# Patient Record
Sex: MALE | Race: WHITE | NOT HISPANIC OR LATINO | Employment: FULL TIME | URBAN - NONMETROPOLITAN AREA
[De-identification: names, ages, dates, MRNs, and addresses within clinical notes are randomized per-mention and may not be internally consistent; named-entity substitution may affect disease eponyms.]

---

## 2018-06-06 ENCOUNTER — HOSPITAL ENCOUNTER (EMERGENCY)
Facility: HOSPITAL | Age: 53
Discharge: HOME | End: 2018-06-06
Attending: NURSE PRACTITIONER | Admitting: NURSE PRACTITIONER
Payer: COMMERCIAL

## 2018-06-06 VITALS
DIASTOLIC BLOOD PRESSURE: 96 MMHG | SYSTOLIC BLOOD PRESSURE: 165 MMHG | OXYGEN SATURATION: 99 % | TEMPERATURE: 97.2 F | HEART RATE: 102 BPM | RESPIRATION RATE: 20 BRPM

## 2018-06-06 DIAGNOSIS — M10.072 ACUTE IDIOPATHIC GOUT OF LEFT FOOT: ICD-10-CM

## 2018-06-06 LAB
BASOPHILS # BLD AUTO: 0.1 10E9/L (ref 0–0.2)
BASOPHILS NFR BLD AUTO: 0.6 %
CRP SERPL-MCNC: <2.9 MG/L (ref 0–8)
DIFFERENTIAL METHOD BLD: NORMAL
EOSINOPHIL # BLD AUTO: 0.2 10E9/L (ref 0–0.7)
EOSINOPHIL NFR BLD AUTO: 1.8 %
ERYTHROCYTE [DISTWIDTH] IN BLOOD BY AUTOMATED COUNT: 12.6 % (ref 10–15)
HCT VFR BLD AUTO: 41.5 % (ref 40–53)
HGB BLD-MCNC: 14.8 G/DL (ref 13.3–17.7)
IMM GRANULOCYTES # BLD: 0 10E9/L (ref 0–0.4)
IMM GRANULOCYTES NFR BLD: 0.3 %
LYMPHOCYTES # BLD AUTO: 2.1 10E9/L (ref 0.8–5.3)
LYMPHOCYTES NFR BLD AUTO: 23.6 %
MCH RBC QN AUTO: 32.3 PG (ref 26.5–33)
MCHC RBC AUTO-ENTMCNC: 35.7 G/DL (ref 31.5–36.5)
MCV RBC AUTO: 91 FL (ref 78–100)
MONOCYTES # BLD AUTO: 0.8 10E9/L (ref 0–1.3)
MONOCYTES NFR BLD AUTO: 9.1 %
NEUTROPHILS # BLD AUTO: 5.7 10E9/L (ref 1.6–8.3)
NEUTROPHILS NFR BLD AUTO: 64.6 %
NRBC # BLD AUTO: 0 10*3/UL
NRBC BLD AUTO-RTO: 0 /100
PLATELET # BLD AUTO: 234 10E9/L (ref 150–450)
RBC # BLD AUTO: 4.58 10E12/L (ref 4.4–5.9)
URATE SERPL-MCNC: 7.6 MG/DL (ref 3.5–7.2)
WBC # BLD AUTO: 8.8 10E9/L (ref 4–11)

## 2018-06-06 PROCEDURE — 84550 ASSAY OF BLOOD/URIC ACID: CPT | Performed by: NURSE PRACTITIONER

## 2018-06-06 PROCEDURE — 86140 C-REACTIVE PROTEIN: CPT | Performed by: NURSE PRACTITIONER

## 2018-06-06 PROCEDURE — G0463 HOSPITAL OUTPT CLINIC VISIT: HCPCS

## 2018-06-06 PROCEDURE — 36415 COLL VENOUS BLD VENIPUNCTURE: CPT | Performed by: NURSE PRACTITIONER

## 2018-06-06 PROCEDURE — 25000125 ZZHC RX 250: Performed by: NURSE PRACTITIONER

## 2018-06-06 PROCEDURE — 85025 COMPLETE CBC W/AUTO DIFF WBC: CPT | Performed by: NURSE PRACTITIONER

## 2018-06-06 PROCEDURE — 99202 OFFICE O/P NEW SF 15 MIN: CPT | Performed by: NURSE PRACTITIONER

## 2018-06-06 RX ORDER — PREDNISONE 20 MG/1
40 TABLET ORAL ONCE
Status: COMPLETED | OUTPATIENT
Start: 2018-06-06 | End: 2018-06-06

## 2018-06-06 RX ORDER — PREDNISONE 20 MG/1
TABLET ORAL
Qty: 10 TABLET | Refills: 0 | Status: SHIPPED | OUTPATIENT
Start: 2018-06-06 | End: 2018-07-11

## 2018-06-06 RX ADMIN — PREDNISONE 40 MG: 20 TABLET ORAL at 21:18

## 2018-06-06 ASSESSMENT — ENCOUNTER SYMPTOMS
HEADACHES: 0
FEVER: 0
JOINT SWELLING: 1
APPETITE CHANGE: 0
WOUND: 0
COLOR CHANGE: 1

## 2018-06-06 NOTE — ED AVS SNAPSHOT
[facility]    [address] [address]    Phone:  [phone]                                       Tomasz Newman   MRN: [id number]    Department:  [facility]   Date of Visit:  6/6/2018           Patient Information     Date Of Birth          1965        Your diagnoses for this visit were:     Acute idiopathic gout of left foot        You were seen by Sona Jackson NP.      Follow-up Information     Follow up with Blanco Vazquez MD In 5 days.    Specialty:  Family Practice    Why:  if not improving    Contact information:    [facility]  [address] [facility] [address]  [phone]          Follow up with [facility].    Specialty:  EMERGENCY MEDICINE    Why:  If symptoms worsen    Contact information:    [address] [address] [phone]    Additional information:    From [facility]: Take [address]. Turn left at [address]. Turn left at the first stoplight on [address] [place] [address]. At the first stop sign, take a right onto [address]. Take a left into the parking lot and continue through until you reach the North enterance of the building.       From [place]: Take [address]. Take the [address] ramp towards [place]. Turn left onto [address]. Take a slight right onto [address] [facility]. Turn left at the first stoplight on [address]. At the first stop sign, take a right onto [address]. Take a left into the parking lot and continue through until you reach the North enterance of the building.       From [place]: Take [address]. Take a right at [address]. At the first stop sign, take a right onto [address]. Take a left into the parking lot and continue through until you reach the North enterance of the building.         Discharge Instructions         Gout    Gout is an inflammation of a joint due to a build-up of gout  crystals in the joint fluid. This occurs when there is an excess of uric acid (a normal waste product) in the body. Uric acid builds up in the body when the kidneys are unable to filter enough of it from the blood. This may occur with age. It is also associated with kidney disease. Gout occurs more often in people with obesity, diabetes, high blood pressure, or high levels of fats in the blood. It may run in families. Gout tends to come and go. A flare up of gout is called an attack. Drinking alcohol or eating certain foods (such as shellfish or foods with additives such as high-fructose corn syrup) may increase uric acid levels in the blood and cause a gout attack.  During a gout attack, the affected joint may become a hot, red, swollen and painful. If you have had one attack of gout, you are likely to have another. An attack of gout can be treated with medicine. If these attacks become frequent, a daily medicine may be prescribed to help the kidneys remove uric acid from the body.  Home care  During a gout attack:    Rest painful joints. If gout affects the joints of your foot or leg, you may want to use crutches for the first few days to keep from bearing weight on the affected joint.    When sitting or lying down, raise the painful joint to a level higher than your heart.    Apply an ice pack (ice cubes in a plastic bag wrapped in a thin towel) over the injured area for 20 minutes every 1 to 2 hours the first day for pain relief. Continue this 3 to 4 times a day for swelling and pain.    Avoid alcohol and foods listed below (see Preventing attacks) during a gout attack. Drink extra fluid to help flush the uric acid through your kidneys.    If you were prescribed a medicine to treat gout, take it as your healthcare provider has instructed. Don't skip doses.    Take anti-inflammatory medicine as directed.     If pain medicines have been prescribed, take them exactly as directed.    Preventing attacks    Minimize  or avoid alcohol use. Excess alcohol intake can cause a gout attack.    Limit these foods and beverages:  ? Organ meats, such as kidneys and liver  ? Certain seafoods (anchovies, sardines, shrimp, scallops, herring, mackerel)  ? Wild game, meat extracts and meat gravies  ? Foods and beverages sweetened with high-fructose corn syrup, such as sodas    Eat a healthy diet including low-fat and nonfat dairy, whole grains, and vegetables.    If you are overweight, talk to your healthcare provider about a weight reduction plan. Avoid fasting or extreme low calorie diets (less than 900 calories per day). This will increase uric acid levels in the body.    If you have diabetes or high blood pressure, work with your doctor to manage these conditions.    Protect the joint from injury. Trauma can trigger a gout attack.  Follow-up care  Follow up with your healthcare provider, or as advised.                 Review of your medicines      START taking        Dose / Directions Last dose taken    predniSONE 20 MG tablet   Commonly known as:  DELTASONE   Quantity:  10 tablet        Take two tablets (= 40mg) each day for 5 (five) days   Refills:  0          Our records show that you are taking the medicines listed below. If these are incorrect, please call your family doctor or clinic.        Dose / Directions Last dose taken    acetaminophen-codeine 300-30 MG per tablet   Commonly known as:  TYLENOL #3   Dose:  1 tablet        Take 1 tablet by mouth every 6 hours as needed for severe pain   Refills:  0        FLEXERIL PO   Dose:  10 mg        Take 10 mg by mouth 3 times daily as needed for muscle spasms   Refills:  0        NAPROXEN SODIUM PO   Dose:  550 mg        Take 550 mg by mouth   Refills:  0                Information about OPIOIDS     PRESCRIPTION OPIOIDS: WHAT YOU NEED TO KNOW   You have a prescription for an opioid (narcotic) pain medicine. Opioids can cause addiction. If you have a history of chemical dependency of any  type, you are at a higher risk of becoming addicted to opioids. Only take this medicine after all other options have been tried. Take it for as short a time and as few doses as possible.     Do not:    Drive. If you drive while taking these medicines, you could be arrested for driving under the influence (DUI).    Operate heavy machinery    Do any other dangerous activities while taking these medicines.     Drink any alcohol while taking these medicines.      Take with any other medicines that contain acetaminophen. Read all labels carefully. Look for the word  acetaminophen  or  Tylenol.  Ask your pharmacist if you have questions or are unsure.    Store your pills in a secure place, locked if possible. We will not replace any lost or stolen medicine. If you don t finish your medicine, please throw away (dispose) as directed by your pharmacist. The [place] [employer]ution [employer] has more information about safe disposal: https://www.pca.[facility].us/living-green/managing-unwanted-medications    All opioids tend to cause constipation. Drink plenty of water and eat foods that have a lot of fiber, such as fruits, vegetables, prune juice, apple juice and high-fiber cereal. Take a laxative (Miralax, milk of magnesia, Colace, Senna) if you don t move your bowels at least every other day.         Prescriptions were sent or printed at these locations (1 Prescription)                   [facility] [address] [place] [address] [address] [facility] [address]   [address] [facility] [address] [place] [address]    Telephone:  [phone]   Fax:  [phone]   Hours:                  E-Prescribed (1 of 1)         predniSONE (DELTASONE) 20 MG tablet                Procedures and tests performed during your visit     CBC with platelets differential    CRP inflammation    Uric acid      Orders Needing Specimen Collection     None      Pending Results     No orders found from 6/4/2018 to 6/7/2018.            Pending Culture Results     No orders found from 6/4/2018  "to 2018.            Thank you for choosing [facility]       Thank you for choosing [facility] for your care. Our goal is always to provide you with excellent care. Hearing back from our patients is one way we can continue to improve our services. Please take a few minutes to complete the written survey that you may receive in the mail after you visit with us. Thank you!        [employer] Information     [employer] lets you send messages to your doctor, view your test results, renew your prescriptions, schedule appointments and more. To sign up, go to [email or web address] . Click on \"Log in\" on the left side of the screen, which will take you to the Welcome page. Then click on \"Sign up Now\" on the right side of the page.     You will be asked to enter the access code listed below, as well as some personal information. Please follow the directions to create your username and password.     Your access code is: [email or web address]  Expires: 2018  9:13 PM     Your access code will  in 90 days. If you need help or a new code, please call your [facility] clinic or [phone].        Care EveryWhere ID     This is your Care EveryWhere ID. This could be used by other organizations to access your [facility] medical records  [id number]        Equal Access to Services     SAL VEGA : Guicho Hernandez, waaxda luqadaha, qaybta kaalmada adeerickayada, john de guzman. So [facility] [phone].    ATENCIÓN: Si habla español, tiene a tatum disposición servicios gratuitos de asistencia lingüística. Llame al [phone].    We comply with applicable federal civil rights laws and [place] laws. We do not discriminate on the basis of race, color, national origin, age, disability, sex, sexual orientation, or gender identity.            After Visit Summary       This is your record. Keep this with you and show to your community pharmacist(s) and doctor(s) at your next visit.                  "

## 2018-06-06 NOTE — ED AVS SNAPSHOT
[facility]    [address] [address]    Phone:  [phone]                                       Tomasz Newman   MRN: [id number]    Department[facility]  [facility]   Date of Visit:  6/6/2018           After Visit Summary Signature Page     I have received my discharge instructions, and my questions have been answered. I have discussed any challenges I see with this plan with the nurse or doctor.    ..........................................................................................................................................  Patient/Patient Representative Signature      ..........................................................................................................................................  Patient Representative Print Name and Relationship to Patient    ..................................................               ................................................  Date                                            Time    ..........................................................................................................................................  Reviewed by Signature/Title    ...................................................              ..............................................  Date                                                            Time

## 2018-06-06 NOTE — LETTER
June 6, 2018      To Whom It May Concern:      Tomasz Newman was Urgent Care today and was found to have gout, 06/06/18.  I expect his condition to improve over the next 1-3 days, please allow him light duty until his pain is improved. He may return to full duty without restriction when resolved.    Sincerely,      Sona Jackson, [facility]  9:13 PM  June 6, 2018

## 2018-06-07 NOTE — DISCHARGE INSTRUCTIONS
Gout    Gout is an inflammation of a joint due to a build-up of gout crystals in the joint fluid. This occurs when there is an excess of uric acid (a normal waste product) in the body. Uric acid builds up in the body when the kidneys are unable to filter enough of it from the blood. This may occur with age. It is also associated with kidney disease. Gout occurs more often in people with obesity, diabetes, high blood pressure, or high levels of fats in the blood. It may run in families. Gout tends to come and go. A flare up of gout is called an attack. Drinking alcohol or eating certain foods (such as shellfish or foods with additives such as high-fructose corn syrup) may increase uric acid levels in the blood and cause a gout attack.  During a gout attack, the affected joint may become a hot, red, swollen and painful. If you have had one attack of gout, you are likely to have another. An attack of gout can be treated with medicine. If these attacks become frequent, a daily medicine may be prescribed to help the kidneys remove uric acid from the body.  Home care  During a gout attack:    Rest painful joints. If gout affects the joints of your foot or leg, you may want to use crutches for the first few days to keep from bearing weight on the affected joint.    When sitting or lying down, raise the painful joint to a level higher than your heart.    Apply an ice pack (ice cubes in a plastic bag wrapped in a thin towel) over the injured area for 20 minutes every 1 to 2 hours the first day for pain relief. Continue this 3 to 4 times a day for swelling and pain.    Avoid alcohol and foods listed below (see Preventing attacks) during a gout attack. Drink extra fluid to help flush the uric acid through your kidneys.    If you were prescribed a medicine to treat gout, take it as your healthcare provider has instructed. Don't skip doses.    Take anti-inflammatory medicine as directed.     If pain medicines have been  prescribed, take them exactly as directed.    Preventing attacks    Minimize or avoid alcohol use. Excess alcohol intake can cause a gout attack.    Limit these foods and beverages:  ? Organ meats, such as kidneys and liver  ? Certain seafoods (anchovies, sardines, shrimp, scallops, herring, mackerel)  ? Wild game, meat extracts and meat gravies  ? Foods and beverages sweetened with high-fructose corn syrup, such as sodas    Eat a healthy diet including low-fat and nonfat dairy, whole grains, and vegetables.    If you are overweight, talk to your healthcare provider about a weight reduction plan. Avoid fasting or extreme low calorie diets (less than 900 calories per day). This will increase uric acid levels in the body.    If you have diabetes or high blood pressure, work with your doctor to manage these conditions.    Protect the joint from injury. Trauma can trigger a gout attack.  Follow-up care  Follow up with your healthcare provider, or as advised.

## 2018-06-07 NOTE — ED TRIAGE NOTES
Pt presents today with c/o right foot pain. Pain is on top of the foot. Denies any injury. Pt says the pain started yesterday and today he has noticed that it is swollen. No bruising noted. Red area on the top of the foot, and swelling noted. Area is also war to touch. Pt states the area is starting to feel numb.

## 2018-06-07 NOTE — ED PROVIDER NOTES
History     Chief Complaint   Patient presents with     Foot Pain     The history is provided by the patient. No  was used.     Tomasz Newman is a 52 year old male who presents with redness, warmth and pain to the top of his right foot since yesterday.   Has been resting it at home with worsening symptoms. He is able to walk on it but is very painful. No injury. No illness, no fever, no recent tick bites.    Problem List:    There are no active problems to display for this patient.       Past Medical History:    No past medical history on file.    Past Surgical History:    Past Surgical History:   Procedure Laterality Date     HERNIA REPAIR  2002       Family History:    Family History   Problem Relation Age of Onset     Cancer - colorectal Father      DIABETES Brother      HEART DISEASE Mother      heart disease       Social History:  Marital Status:  Single [1]  Social History   Substance Use Topics     Smoking status: Never Smoker     Smokeless tobacco: Not on file      Comment: no passive exposure     Alcohol use Yes      Comment: wine 3 glasses occasionally        Medications:      acetaminophen-codeine (TYLENOL #3) 300-30 MG per tablet   NAPROXEN SODIUM PO   predniSONE (DELTASONE) 20 MG tablet   Cyclobenzaprine HCl (FLEXERIL PO)         Review of Systems   Constitutional: Negative for appetite change and fever.   Musculoskeletal: Positive for joint swelling.   Skin: Positive for color change. Negative for wound.   Neurological: Negative for headaches.       Physical Exam   BP: 165/96  Pulse: 102  Temp: 97.2  F (36.2  C)  Resp: 20  SpO2: 99 %      Physical Exam   Constitutional: He is oriented to person, place, and time. He appears well-developed. No distress.   HENT:   Head: Normocephalic and atraumatic.   Nose: Nose normal.   Eyes: Conjunctivae are normal. No scleral icterus.   Cardiovascular: Normal rate.    Pulmonary/Chest: Effort normal.   Musculoskeletal:        Right ankle:  Normal. Achilles tendon normal.        Right foot: There is bony tenderness and swelling. There is normal capillary refill, no crepitus, no deformity and no laceration.        Feet:    Neurological: He is alert and oriented to person, place, and time.   Skin: Skin is warm, dry and intact. He is not diaphoretic.   Psychiatric: He has a normal mood and affect.   Nursing note and vitals reviewed.      ED Course     ED Course     Procedures      Results for orders placed or performed during the hospital encounter of 06/06/18 (from the past 24 hour(s))   CBC with platelets differential   Result Value Ref Range    WBC 8.8 4.0 - 11.0 10e9/L    RBC Count 4.58 4.4 - 5.9 10e12/L    Hemoglobin 14.8 13.3 - 17.7 g/dL    Hematocrit 41.5 40.0 - 53.0 %    MCV 91 78 - 100 fl    MCH 32.3 26.5 - 33.0 pg    MCHC 35.7 31.5 - 36.5 g/dL    RDW 12.6 10.0 - 15.0 %    Platelet Count 234 150 - 450 10e9/L    Diff Method Automated Method     % Neutrophils 64.6 %    % Lymphocytes 23.6 %    % Monocytes 9.1 %    % Eosinophils 1.8 %    % Basophils 0.6 %    % Immature Granulocytes 0.3 %    Nucleated RBCs 0 0 /100    Absolute Neutrophil 5.7 1.6 - 8.3 10e9/L    Absolute Lymphocytes 2.1 0.8 - 5.3 10e9/L    Absolute Monocytes 0.8 0.0 - 1.3 10e9/L    Absolute Eosinophils 0.2 0.0 - 0.7 10e9/L    Absolute Basophils 0.1 0.0 - 0.2 10e9/L    Abs Immature Granulocytes 0.0 0 - 0.4 10e9/L    Absolute Nucleated RBC 0.0    CRP inflammation   Result Value Ref Range    CRP Inflammation <2.9 0.0 - 8.0 mg/L   Uric acid   Result Value Ref Range    Uric Acid 7.6 (H) 3.5 - 7.2 mg/dL       Medications   predniSONE (DELTASONE) tablet 40 mg (40 mg Oral Given 6/6/18 2118)       Assessments & Plan (with Medical Decision Making)     I have reviewed the nursing notes.    I have reviewed the findings, diagnosis, plan and need for follow up with the patient.  Elevated uric acid, WBC & CRP are normal. Will treat with prednisone.   Given initial dose here tonight.   Given Epic  educational materials.   See PCP if not improving in 5-7 days.   Return here if sx worsen.   Given a work note for light duty until improving.     New Prescriptions    PREDNISONE (DELTASONE) 20 MG TABLET    Take two tablets (= 40mg) each day for 5 (five) days       Final diagnoses:   Acute idiopathic gout of left foot       6/6/2018   [facility]     Sona Jackson NP  06/06/18 5598

## 2018-07-11 ENCOUNTER — APPOINTMENT (OUTPATIENT)
Dept: ULTRASOUND IMAGING | Facility: HOSPITAL | Age: 53
End: 2018-07-11
Attending: PHYSICIAN ASSISTANT
Payer: COMMERCIAL

## 2018-07-11 ENCOUNTER — HOSPITAL ENCOUNTER (EMERGENCY)
Facility: HOSPITAL | Age: 53
Discharge: HOME | End: 2018-07-11
Attending: PHYSICIAN ASSISTANT | Admitting: PHYSICIAN ASSISTANT
Payer: COMMERCIAL

## 2018-07-11 VITALS
HEART RATE: 97 BPM | TEMPERATURE: 98.9 F | SYSTOLIC BLOOD PRESSURE: 146 MMHG | DIASTOLIC BLOOD PRESSURE: 98 MMHG | OXYGEN SATURATION: 100 % | RESPIRATION RATE: 18 BRPM

## 2018-07-11 DIAGNOSIS — M79.89 SWELLING OF LEFT LOWER EXTREMITY: ICD-10-CM

## 2018-07-11 DIAGNOSIS — R21 RASH: ICD-10-CM

## 2018-07-11 PROCEDURE — 93971 EXTREMITY STUDY: CPT | Mod: TC

## 2018-07-11 PROCEDURE — 99284 EMERGENCY DEPT VISIT MOD MDM: CPT | Performed by: PHYSICIAN ASSISTANT

## 2018-07-11 PROCEDURE — 99284 EMERGENCY DEPT VISIT MOD MDM: CPT | Mod: 25

## 2018-07-11 ASSESSMENT — ENCOUNTER SYMPTOMS
BRUISES/BLEEDS EASILY: 0
COLOR CHANGE: 1
CHILLS: 0
WOUND: 0
ACTIVITY CHANGE: 0
APPETITE CHANGE: 0
SHORTNESS OF BREATH: 0
ABDOMINAL PAIN: 0
NEUROLOGICAL NEGATIVE: 1
VOMITING: 0
FEVER: 0
NAUSEA: 0
FATIGUE: 0

## 2018-07-11 NOTE — ED AVS SNAPSHOT
[facility]    [address] [address]    Phone:  [phone]                                       Tomasz Newman   MRN: [id number]    Department[facility]  [facility]   Date of Visit:  7/11/2018           After Visit Summary Signature Page     I have received my discharge instructions, and my questions have been answered. I have discussed any challenges I see with this plan with the nurse or doctor.    ..........................................................................................................................................  Patient/Patient Representative Signature      ..........................................................................................................................................  Patient Representative Print Name and Relationship to Patient    ..................................................               ................................................  Date                                            Time    ..........................................................................................................................................  Reviewed by Signature/Title    ...................................................              ..............................................  Date                                                            Time

## 2018-07-11 NOTE — ED NOTES
Patient being evaluated today for left calf pain with ambulation. He reports an injury 4 weeks ago with increased pain X 1 week. Patient denies pain at rest. He is otherwise healthy. Patient denies any SOB or chest pain. He does note, however, a small area of rash that started Monday. Patient resting on ED cart. Call light in reach.

## 2018-07-11 NOTE — DISCHARGE INSTRUCTIONS
Leg Swelling in a Single Leg  Swelling of the arms, feet, ankles, and legs is called edema. It is caused by extra fluid collecting in the tissues. Because of gravity, extra fluid in the body settles to the lowest part. That is why the legs and feet are most affected. You have swelling in a single leg.  Some of the causes for swelling in only a single leg include:    Infection in the foot or leg    Long-term problem with a vein not working well (venous insufficiency)    Swollen, twisted vein in the leg (varicose veins)    Insect bite or sting on the foot or leg    Injury or recent surgery on the foot or leg    Blood clot in a deep vein of the leg (deep vein thrombosis or DVT)    Inflammation of the joints of the lower leg  Medical treatment will depend on what is causing your swelling.  Home care  Follow these guidelines when caring for yourself at home:    Don t wear tight clothing.    Keep your legs up while lying or sitting.    Take any medicines as directed.    If infection, injury, or recent surgery is the cause of your swelling, stay off your legs as much as possible until your symptoms get better.    If you have venous insufficiency or varicose veins, don t sit or  one place for long periods of time. Take breaks and walk around every few hours. Talk with your healthcare provider about wearing support stockings to help lessen swelling during the day.    Wear compression stockings with your doctor's approval  Follow-up care  Follow up with your healthcare provider as advised.  Call 911  Call 911 if any of these occur:    Shortness of breath or trouble breathing    Chest pain    Coughing up blood    Fainting or loss of consciousness   When to seek medical advice  Call your healthcare provider right away if any of these occur:    Increased pain, swelling, warmth, or redness of the leg, ankle, or foot    Fever of 100.4 F (38 C) or higher, or as directed by your healthcare provider    Weakness or  dizziness    Shaking chills    Drenching sweats  Date Last Reviewed: 4/11/2016 2000-2017 [employer]. [address] [place] [address], [place] [address]. All rights reserved. This information is not intended as a substitute for professional medical care. Always follow your healthcare professional's instructions.      I'm not quite sure why your leg is swollen and painful.  But, I believe that you may have some form of autoimmune disease or the like.     Please monitor your symptoms closely and follow-up with Dr. Vazquez.    Please return here for ANY fevers, worsening symptoms, new rashes or other concerns.

## 2018-07-11 NOTE — ED PROVIDER NOTES
History     Chief Complaint   Patient presents with     Leg Pain     lt calf pain x 4 weeks with walking, notes redness and swelling     The history is provided by the patient.     Tomasz Newman is a 52 year old male who presented to the emergency department ambulatory for evaluation of left lower leg swelling and pain.  Symptoms have been present for approximately last 4 weeks.  Tells me that he had a possible injury to his right foot 6 weeks ago and recently underwent MRI due to persistent pain.  He tells me that the left lower extremity symptoms seem to have started at that time as well.  Denies any fevers or chills.  Denies any unusual rashes but states that he does have new onset redness on the medial aspect of the lower leg.  Denies any falls.  Pain is worse with ambulation.    Problem List:    There are no active problems to display for this patient.       Past Medical History:    No past medical history on file.    Past Surgical History:    Past Surgical History:   Procedure Laterality Date     HERNIA REPAIR  2002       Family History:    Family History   Problem Relation Age of Onset     Cancer - colorectal Father      Diabetes Brother      HEART DISEASE Mother      heart disease       Social History:  Marital Status:  Single [1]  Social History   Substance Use Topics     Smoking status: Never Smoker     Smokeless tobacco: Not on file      Comment: no passive exposure     Alcohol use Yes      Comment: wine 3 glasses occasionally        Medications:      acetaminophen-codeine (TYLENOL #3) 300-30 MG per tablet   Cyclobenzaprine HCl (FLEXERIL PO)   NAPROXEN SODIUM PO         Review of Systems   Constitutional: Negative for activity change, appetite change, chills, fatigue and fever.   Respiratory: Negative for shortness of breath.    Cardiovascular: Negative for chest pain.   Gastrointestinal: Negative for abdominal pain, nausea and vomiting.   Musculoskeletal:        Left lower leg swelling and pain with  new onset medial rash.  Right dorsal foot pain and swelling for approximately 6 weeks   Skin: Positive for color change and rash. Negative for wound.   Neurological: Negative.    Hematological: Does not bruise/bleed easily.       Physical Exam   BP: 162/96  Heart Rate: 91  Temp: 97.9  F (36.6  C)  Resp: 14  SpO2: 98 %      Physical Exam   Constitutional: He is oriented to person, place, and time. He appears well-developed and well-nourished. No distress.   Pleasant and talkative   Cardiovascular: Normal rate and regular rhythm.    Pulmonary/Chest: Effort normal.   Musculoskeletal:   Examination of the left lower extremity shows some mild swelling to the calf with tenderness upon palpation.  There is no appreciable or significant cellulitis.  There is a small area of mild macular erythema to the left medial aspect of the lower extremity just above the ankle.  It is blanchable.  There is no petechiae, purpura, ulcers, or vesicles.  There is no lymphangitis.  Examination of the knee is unremarkable.  Ankle has normal passive and active range of motion.   Neurological: He is alert and oriented to person, place, and time.   Skin: Skin is warm and dry.   Nursing note and vitals reviewed.      ED Course     ED Course     Procedures               Critical Care time:  none               No results found for this or any previous visit (from the past 24 hour(s)).    Medications - No data to display    Assessments & Plan (with Medical Decision Making)   Preliminary ultrasound report is negative for DVT.  I had a long detailed discussion with Tomasz regarding his current persistent symptoms.  I would have concerns over a likely underlying autoimmune disease causing the persistent and chronic multiple joint symptoms as well as recent persistent plantar foot pain and swelling requiring MRI and new onset left leg swelling with nonspecific medial macular rash.  Stressed close clinic follow-up.  Return to the emergency department  for any worsening symptoms, fevers, chills, or any other questions or concerns.  At this point I can find no reasonable or compelling medical indication for laboratory work.  His examination and workup is not consistent with cellulitis or infectious process.  He has no fever or tachycardia.  Tomasz voiced complete understanding and was happy and agreeable.      I have reviewed the nursing notes.    I have reviewed the findings, diagnosis, plan and need for follow up with the patient.       New Prescriptions    No medications on file       Final diagnoses:   Swelling of left lower extremity   Rash       7/11/2018   [facility]     Yayo Andersen PA-C  07/11/18 3801

## 2018-07-11 NOTE — ED AVS SNAPSHOT
[facility]    [address] [address]    Phone:  [phone]                                       Tomasz Newman   MRN: [id number]    Department:  [facility]   Date of Visit:  7/11/2018           Patient Information     Date Of Birth          1965        Your diagnoses for this visit were:     Swelling of left lower extremity     Rash        You were seen by Yayo Andersen PA-C.      Follow-up Information     Schedule an appointment as soon as possible for a visit with Blanco Vazquez MD.    Specialty:  Family Practice    Contact information:    [facility]  [address] [facility] [address]  [phone]          Follow up with [facility].    Specialty:  EMERGENCY MEDICINE    Why:  If symptoms worsen    Contact information:    [address] [address] [phone]    Additional information:    From [place]: Take [address]. Turn left at [address]. Turn left at the first stoplight on [address]. At the first stop sign, take a right onto [address]. Take a left into the parking lot and continue through until you reach the North enterance of the building.       From [place]: Take [address]. Take the [address] ramp towards [place]. Turn left onto [address]. Take a slight right onto [address]. Turn left at the first stoplight on [address]. At the first stop sign, take a right onto [address]. Take a left into the parking lot and continue through until you reach the North enterance of the building.       From [place]: Take [address]. Take a right at [address]. At the first stop sign, take a right onto [address]. Take a left into the parking lot and continue through until you reach the North enterance of the building.         Discharge Instructions         Leg Swelling in a Single Leg  Swelling of the arms, feet,  ankles, and legs is called edema. It is caused by extra fluid collecting in the tissues. Because of gravity, extra fluid in the body settles to the lowest part. That is why the legs and feet are most affected. You have swelling in a single leg.  Some of the causes for swelling in only a single leg include:    Infection in the foot or leg    Long-term problem with a vein not working well (venous insufficiency)    Swollen, twisted vein in the leg (varicose veins)    Insect bite or sting on the foot or leg    Injury or recent surgery on the foot or leg    Blood clot in a deep vein of the leg (deep vein thrombosis or DVT)    Inflammation of the joints of the lower leg  Medical treatment will depend on what is causing your swelling.  Home care  Follow these guidelines when caring for yourself at home:    Don t wear tight clothing.    Keep your legs up while lying or sitting.    Take any medicines as directed.    If infection, injury, or recent surgery is the cause of your swelling, stay off your legs as much as possible until your symptoms get better.    If you have venous insufficiency or varicose veins, don t sit or  one place for long periods of time. Take breaks and walk around every few hours. Talk with your healthcare provider about wearing support stockings to help lessen swelling during the day.    Wear compression stockings with your doctor's approval  Follow-up care  Follow up with your healthcare provider as advised.  Call 911  Call 911 if any of these occur:    Shortness of breath or trouble breathing    Chest pain    Coughing up blood    Fainting or loss of consciousness   When to seek medical advice  Call your healthcare provider right away if any of these occur:    Increased pain, swelling, warmth, or redness of the leg, ankle, or foot    Fever of 100.4 F (38 C) or higher, or as directed by your healthcare provider    Weakness or dizziness    Shaking chills    Drenching sweats  Date Last Reviewed:  4/11/2016 2000-2017 [employer]. [address] [place] [address], [place] [address]. All rights reserved. This information is not intended as a substitute for professional medical care. Always follow your healthcare professional's instructions.      I'm not quite sure why your leg is swollen and painful.  But, I believe that you may have some form of autoimmune disease or the like.     Please monitor your symptoms closely and follow-up with Dr. Vazquez.    Please return here for ANY fevers, worsening symptoms, new rashes or other concerns.        Review of your medicines      Our records show that you are taking the medicines listed below. If these are incorrect, please call your family doctor or clinic.        Dose / Directions Last dose taken    acetaminophen-codeine 300-30 MG per tablet   Commonly known as:  TYLENOL #3   Dose:  1 tablet        Take 1 tablet by mouth every 6 hours as needed for severe pain   Refills:  0        FLEXERIL PO   Dose:  10 mg        Take 10 mg by mouth 3 times daily as needed for muscle spasms   Refills:  0        NAPROXEN SODIUM PO   Dose:  550 mg        Take 550 mg by mouth   Refills:  0                Procedures and tests performed during your visit     US Lower Extremity Venous Duplex Left      Orders Needing Specimen Collection     None      Pending Results     Date and Time Order Name Status Description    7/11/2018 1617  Lower Extremity Venous Duplex Left In process             Pending Culture Results     No orders found from 7/9/2018 to 7/12/2018.            Thank you for choosing [facility]       Thank you for choosing [facility] for your care. Our goal is always to provide you with excellent care. Hearing back from our patients is one way we can continue to improve our services. Please take a few minutes to complete the written survey that you may receive in the mail after you visit with us. Thank you!        [facility] Information     [employer] lets you send messages to your  "doctor, view your test results, renew your prescriptions, schedule appointments and more. To sign up, go to [email or web address] . Click on \"Log in\" on the left side of the screen, which will take you to the Welcome page. Then click on \"Sign up Now\" on the right side of the page.     You will be asked to enter the access code listed below, as well as some personal information. Please follow the directions to create your username and password.     Your access code is: [email or web address]  Expires: 2018  9:13 PM     Your access code will  in 90 days. If you need help or a new code, please call your [facility] clinic or [phone].        Care EveryWhere ID     This is your Care EveryWhere ID. This could be used by other organizations to access your [facility] medical records  [id number]        Equal Access to Services     [facility]: Hadceline Hernandez, catina alejandra, lavonne george, john mcdonald . So [facility] [phone].    ATENCIÓN: Si habla español, tiene a tatum disposición servicios gratuitos de asistencia lingüística. Llame al [phone].    We comply with applicable federal civil rights laws and [place] laws. We do not discriminate on the basis of race, color, national origin, age, disability, sex, sexual orientation, or gender identity.            After Visit Summary       This is your record. Keep this with you and show to your community pharmacist(s) and doctor(s) at your next visit.                  "

## 2019-04-02 ENCOUNTER — OFFICE VISIT (OUTPATIENT)
Dept: PODIATRY | Facility: OTHER | Age: 54
End: 2019-04-02
Attending: PODIATRIST
Payer: COMMERCIAL

## 2019-04-02 ENCOUNTER — TELEPHONE (OUTPATIENT)
Dept: PODIATRY | Facility: OTHER | Age: 54
End: 2019-04-02

## 2019-04-02 VITALS
TEMPERATURE: 97.6 F | SYSTOLIC BLOOD PRESSURE: 152 MMHG | WEIGHT: 180 LBS | HEIGHT: 70 IN | HEART RATE: 101 BPM | BODY MASS INDEX: 25.77 KG/M2 | OXYGEN SATURATION: 95 % | DIASTOLIC BLOOD PRESSURE: 80 MMHG

## 2019-04-02 DIAGNOSIS — M79.671 RIGHT FOOT PAIN: ICD-10-CM

## 2019-04-02 DIAGNOSIS — M84.375A STRESS FRACTURE OF METATARSAL BONE OF LEFT FOOT, INITIAL ENCOUNTER: ICD-10-CM

## 2019-04-02 DIAGNOSIS — M79.672 LEFT FOOT PAIN: ICD-10-CM

## 2019-04-02 DIAGNOSIS — M84.374A STRESS FRACTURE OF METATARSAL BONE OF RIGHT FOOT, INITIAL ENCOUNTER: ICD-10-CM

## 2019-04-02 DIAGNOSIS — M21.6X1 ACQUIRED BILATERAL PES CAVUS: ICD-10-CM

## 2019-04-02 DIAGNOSIS — M84.374A STRESS FRACTURE OF METATARSAL BONE OF RIGHT FOOT, INITIAL ENCOUNTER: Primary | ICD-10-CM

## 2019-04-02 DIAGNOSIS — M21.6X2 ACQUIRED BILATERAL PES CAVUS: ICD-10-CM

## 2019-04-02 PROCEDURE — 99203 OFFICE O/P NEW LOW 30 MIN: CPT | Performed by: PODIATRIST

## 2019-04-02 PROCEDURE — 73630 X-RAY EXAM OF FOOT: CPT | Mod: TC

## 2019-04-02 ASSESSMENT — MIFFLIN-ST. JEOR: SCORE: 1677.72

## 2019-04-02 ASSESSMENT — PAIN SCALES - GENERAL: PAINLEVEL: EXTREME PAIN (8)

## 2019-04-02 NOTE — NURSING NOTE
"Chief Complaint   Patient presents with     Musculoskeletal Problem     right foot pain       Initial /80 (BP Location: Left arm, Patient Position: Sitting, Cuff Size: Adult Regular)   Pulse 101   Temp 97.6  F (36.4  C) (Tympanic)   Ht 1.778 m (5' 10\")   Wt 81.6 kg (180 lb)   SpO2 95%   BMI 25.83 kg/m   Estimated body mass index is 25.83 kg/m  as calculated from the following:    Height as of this encounter: 1.778 m (5' 10\").    Weight as of this encounter: 81.6 kg (180 lb).  Medication Reconciliation: complete    Helena Coffman LPN  "

## 2019-04-02 NOTE — TELEPHONE ENCOUNTER
"Called and left vm to return call  ----- Message from Jyoti Steinberg DPM sent at 4/2/2019 11:38 AM CDT -----  Please call patient with his x-ray results.    His RIGHT foot showed a healed or a healing stress fracture of the 2nd metatarsal. There was a \"bone callus\" which means the stress fracture is old, but with his current pain increasing, this could mean his old stress fracture is re-occurring.    The LEFT foot did not currently show a stress fracture, but with the location of his pain in clinic, he may have an early stress fracture that isn't yet showing on the x-ray.    Patient's best plan to decrease his pain is to wear the CAM boots on both legs consistently fr six weeks then slowly progress into a custom insert.      Patient returned call and results were given with patient understanding.  "

## 2019-04-02 NOTE — PROGRESS NOTES
"Chief complaint: Patient presents with:  Musculoskeletal Problem: right foot pain      History of Present Illness: This 53 year old male is seenfor evaluation and suggestions of management of RIGHT foot pain. This is a dull, aching pain on both feet (R>L) last June, 2018. He does not recall any trauma to the foot.     Patient says he had a fracture in his RIGHT 2nd metatarsal that developed last June, 2018. He works for [employer] and he walks a lot on his feet. He was walking at work and the pain in in his foot started one day and got worse throughout the day. He went to Urgent Care on 06/06/2018 and he says an x-ray showed no fracture, so they diagnosed him with gout. He went to an orthopedic at [facility] where he had an MRI that confirmed a fracture of the 2nd metatarsal. They placed him in a CAM boot, but he couldn't tolerate it because he felt off balance. The post-op shoe was open toed so he was not allowed to wear it at work. He couldn't use crutches at work so he didn't offload it at all. The pain continued and was steady until the past month or two when the pan has recently increased.     He also complains of a new dorsal foot pain of his LEFT lateral foot. It is not as painful as his RIGHT foot, but the pain feels similar to how the RIGHT foot foot pain felt when the pain first developed. No further pedal complaints today.     /80 (BP Location: Left arm, Patient Position: Sitting, Cuff Size: Adult Regular)   Pulse 101   Temp 97.6  F (36.4  C) (Tympanic)   Ht 1.778 m (5' 10\")   Wt 81.6 kg (180 lb)   SpO2 95%   BMI 25.83 kg/m      There is no problem list on file for this patient.      Past Surgical History:   Procedure Laterality Date     HERNIA REPAIR  2002       Current Outpatient Medications   Medication     acetaminophen-codeine (TYLENOL #3) 300-30 MG per tablet     Cyclobenzaprine HCl (FLEXERIL PO)     NAPROXEN SODIUM PO     No current facility-administered medications for this visit.  "          Allergies   Allergen Reactions     Percocet [Oxycodone-Acetaminophen]      Facial Swelling       Family History   Problem Relation Age of Onset     Cancer - colorectal Father      Diabetes Brother      Heart Disease Mother         heart disease       Social History     Socioeconomic History     Marital status: Single     Spouse name: None     Number of children: None     Years of education: None     Highest education level: None   Occupational History     None   Social Needs     Financial resource strain: None     Food insecurity:     Worry: None     Inability: None     Transportation needs:     Medical: None     Non-medical: None   Tobacco Use     Smoking status: Never Smoker     Smokeless tobacco: Never Used     Tobacco comment: no passive exposure   Substance and Sexual Activity     Alcohol use: Yes     Comment: wine 3 glasses occasionally     Drug use: None     Sexual activity: None   Lifestyle     Physical activity:     Days per week: None     Minutes per session: None     Stress: None   Relationships     Social connections:     Talks on phone: None     Gets together: None     Attends Mandaen service: None     Active member of club or organization: None     Attends meetings of clubs or organizations: None     Relationship status: None     Intimate partner violence:     Fear of current or ex partner: None     Emotionally abused: None     Physically abused: None     Forced sexual activity: None   Other Topics Concern      Service Not Asked     Blood Transfusions Not Asked     Caffeine Concern Yes     Comment: soda 2 cups daily     Occupational Exposure Not Asked     Hobby Hazards Not Asked     Sleep Concern Not Asked     Stress Concern Not Asked     Weight Concern Not Asked     Special Diet Not Asked     Back Care Not Asked     Exercise Not Asked     Bike Helmet Not Asked     Seat Belt Not Asked     Self-Exams Not Asked     Parent/sibling w/ CABG, MI or angioplasty before 65F 55M? Not Asked    Social History Narrative     None       ROS: 10 point ROS neg other than the symptoms noted above in the HPI.  EXAM  Constitutional: healthy, alert and no distress    Psychiatric: mentation appears normal and affect normal/bright    VASCULAR:  -Dorsalis pedis pulse +2/4 b/l  -Posterior tibial pulse +2/4 b/l  -Capillary refill time < 3 seconds to b/l hallux  -Hair growth Present to b/l anterior legs and ankles  NEURO:  -Light touch sensation intact to b/l plantar forefoot  DERM:  -Skin temperature, texture and turgor WNL b/l  MSK:  -Moderate [ain on palpation to mid shaft of RIGHT 2nd and LEFT 4th metatarsal  ---Mild-to-moderate pain on palpation to RIGHT 3rd metatarsal mid shaft  -High arches bilaterally while patient is NWB  -Muscle strength of ankles +5/5 for dorsiflexion, plantarflexion, ABDUction and ADDuction b/l    RIGHT FOOT RADIOGRAPH 04/02/2019  IMPRESSION: Healing/healed fracture of the distal shaft of the second metatarsal. No acute abnormality.  DION VEGA MD  LEFT FOOT RADIOGRAPH 04/02/2019  FINDINGS: There is lucency through the medial sesamoid, probably a normal variant. No definite fracture is seen and there is no dislocation. In particular, no fracture is seen in the fourth metatarsal.  There is mild degenerative change in the first metatarsal phalangeal joint and there is mild vascular calcification.                                                                 IMPRESSION: No acute fracture.  DION VEGA MD  ============================================================    ASSESSMENT:  (M84.374A) Stress fracture of metatarsal bone of right foot, initial encounter  (primary encounter diagnosis)    (M84.375A) Stress fracture of metatarsal bone of left foot, initial encounter    (M79.671) Right foot pain    (M79.672) Left foot pain    (M21.6X1,  M21.6X2) Acquired bilateral pes cavus      PLAN:  -Patient evaluated and examined. Treatment options discussed with no educational barriers  noted.  -Patient's fracture from last June was likely a stress fracture. He has not offloaded or treated the foot which means he likely still has the stress fracture and it may have progressed. Stressed to the patient how leaving a stress fracture untreated can lead to a larger and more traumatic fracture. If the fracture displaces, it couple potentially require surgical correction.  -Stressed the importance of offloaded a stress fracture in the foot. Patient says he understands and he is in agreement with this plan.  -Patient's clinical exam is positive for a stress fracture likely in both feet (RIGHT 2nd and LEFT 4th metatarsals). The RIGHT x-ray showed evidence of a healed or healing stress fracture. The LEFT foot x-ray did not currently show an x-ray, but these findings can lag on the radiographs.   ---It is recommended the patient be fit for inserts to decrease the pressure and re-occurence of these stress fractures. Do to his history of foot pain from a stress fracture ad his current pain, it is recommended he offload the pressure on both feet. He is in agreement with this plan.  ---Discussed importance of offloading and what will happen if he doesn't offload stress fractures  -Bilateral WB radiographs ordered  ---Called patient with results  -DME bilateral CAM walker boots and walker or other assistive device  -Orthotic referral for CMO with metatarsal pad, rigid surface and carbon fiber foot plate to prevent recurrence of stress fractures when patient transitions back to a regular shoe. The patient may call the orthotist to make adjustments if the inserts are not comfortable after consistently wearing them for 4-6 weeks.   -Dispensed work note stating patient needs to wear bilateral CAM boots  -Patient in agreement with the above treatment plan and all of patient's questions were answered.      RTC three weeks to evaluate progress        Jyoti Steinberg DPM

## 2019-04-02 NOTE — LETTER
April 2, 2019      Tomasz Newman  [address] [address]        To Whom It May Concern:    Tomasz Newman  was seen on by our clinic today. He is instructed to wear a CAM walker boot on BOTH feet for the next six weeks. He is not to walk without these boots. He may work if he is able to sit for his job with minimal walking. He may need to use an assistive device such as a walker for stability with the CAM boot applied to both legs. He will be evaluated again in three weeks to monitor his progress.        Sincerely,        Jyoti Steinberg DPM

## 2019-04-24 ENCOUNTER — OFFICE VISIT (OUTPATIENT)
Dept: PODIATRY | Facility: OTHER | Age: 54
End: 2019-04-24
Attending: PODIATRIST
Payer: COMMERCIAL

## 2019-04-24 VITALS
DIASTOLIC BLOOD PRESSURE: 92 MMHG | OXYGEN SATURATION: 98 % | SYSTOLIC BLOOD PRESSURE: 154 MMHG | HEART RATE: 104 BPM | TEMPERATURE: 98.8 F

## 2019-04-24 DIAGNOSIS — M84.375A STRESS FRACTURE OF METATARSAL BONE OF LEFT FOOT, INITIAL ENCOUNTER: ICD-10-CM

## 2019-04-24 DIAGNOSIS — M79.672 LEFT FOOT PAIN: ICD-10-CM

## 2019-04-24 DIAGNOSIS — M79.671 RIGHT FOOT PAIN: ICD-10-CM

## 2019-04-24 DIAGNOSIS — M21.6X2 ACQUIRED BILATERAL PES CAVUS: ICD-10-CM

## 2019-04-24 DIAGNOSIS — M84.374A STRESS FRACTURE OF METATARSAL BONE OF RIGHT FOOT, INITIAL ENCOUNTER: Primary | ICD-10-CM

## 2019-04-24 DIAGNOSIS — M21.6X1 ACQUIRED BILATERAL PES CAVUS: ICD-10-CM

## 2019-04-24 PROCEDURE — 99213 OFFICE O/P EST LOW 20 MIN: CPT | Performed by: PODIATRIST

## 2019-04-24 RX ORDER — ASPIRIN 81 MG/1
81 TABLET ORAL DAILY
COMMUNITY

## 2019-04-24 RX ORDER — IBUPROFEN 800 MG/1
800 TABLET, FILM COATED ORAL EVERY 8 HOURS PRN
Qty: 20 TABLET | Refills: 0 | Status: ON HOLD | OUTPATIENT
Start: 2019-04-24 | End: 2021-12-15

## 2019-04-24 ASSESSMENT — PAIN SCALES - GENERAL: PAINLEVEL: EXTREME PAIN (8)

## 2019-04-24 NOTE — LETTER
April 24, 2019      Tomasz Newman  [address]  [facility] [address]        To Whom It May Concern:    Tomasz Newman was seen in our clinic on 04/24/2019. He may return to work with the following: he has to wear a CAM walker boot on both feet at all times. He can do minimal walking with the boots. He may only work if he can do a sitting job with minimal walking while using crutches to take additional pressure off the RIGHT foot. He will be seen in four weeks on 05/22/2019 to determine how much longer (if needed) he will be off his feet.       Sincerely,        Jyoti Steinberg DPM

## 2019-04-24 NOTE — PATIENT INSTRUCTIONS
Dr. Steinberg 5/22 @1 pm- Please get Xrays done at [facility] before appt  Kathy Bang 5/14 @10 AM- Same department as podiatry    No weight bearing to either legs with use of crutch on right. Wear Cam boots at all times except while driving.

## 2019-04-24 NOTE — PROGRESS NOTES
Chief complaint: Patient presents with:  Foot Pain: bilateral      History of Present Illness: This 53 year old male is seen for evaluation and suggestions of management of RIGHT foot pain. This is a dull, aching pain on both feet (R>L) last June, 2018. He has been wearing bilateral CAM walker boots sine 04/02/2019. He was not called by the orthotist to get inserts. He is wearing the CAM boots whenever he is at home (about 6-8 hours a day). He is not working and he only goes without the boots to get the mail or to drive somewhere. He presents in regular tennis shoes since he had to drive here. He is not noticing much pain relief with the the CAM boots. Both feet are very painful but the R>L. He is afraid that once the pain improves, going back to work will cause this pain to return again. He is also looking for something to help with his pain at night. No further pedal complaints today.     History of foot pain:   Patient says he had a fracture in his RIGHT 2nd metatarsal that developed last June, 2018. He works for [employer] and he walks a lot on his feet. He was walking at work and the pain in in his foot started one day and got worse throughout the day. He went to Urgent Care on 06/06/2018 and an x-ray showed no fracture, so they diagnosed him with gout. He went to an orthopedic at [facility] where he had an MRI that confirmed a fracture of the 2nd metatarsal. They placed him in a CAM boot, but he couldn't tolerate it because he felt off balance. The post-op shoe was open toed so he was not allowed to wear it at work. He couldn't use crutches at work so he didn't offload it at all. The pain continued and was steady until February/March of 2019 when the pain increased.  He did not recall any trauma to the foot.     He also complains of a new dorsal foot pain of his LEFT lateral foot. It is not as painful as his RIGHT foot, but the pain feels similar to how the RIGHT foot foot pain felt when the pain first  developed. No further pedal complaints today.       BP (!) 154/92   Pulse 104   Temp 98.8  F (37.1  C)   SpO2 98%     There is no problem list on file for this patient.      Past Surgical History:   Procedure Laterality Date     HERNIA REPAIR  2002       Current Outpatient Medications   Medication     aspirin 81 MG EC tablet     ibuprofen (ADVIL/MOTRIN) 800 MG tablet     order for DME     acetaminophen-codeine (TYLENOL #3) 300-30 MG per tablet     Cyclobenzaprine HCl (FLEXERIL PO)     NAPROXEN SODIUM PO     No current facility-administered medications for this visit.           Allergies   Allergen Reactions     Percocet [Oxycodone-Acetaminophen]      Facial Swelling       Family History   Problem Relation Age of Onset     Cancer - colorectal Father      Diabetes Brother      Heart Disease Mother         heart disease       Social History     Socioeconomic History     Marital status: Single     Spouse name: Not on file     Number of children: Not on file     Years of education: Not on file     Highest education level: Not on file   Occupational History     Not on file   Social Needs     Financial resource strain: Not on file     Food insecurity:     Worry: Not on file     Inability: Not on file     Transportation needs:     Medical: Not on file     Non-medical: Not on file   Tobacco Use     Smoking status: Never Smoker     Smokeless tobacco: Never Used     Tobacco comment: no passive exposure   Substance and Sexual Activity     Alcohol use: Yes     Comment: wine 3 glasses occasionally     Drug use: Not on file     Sexual activity: Not on file   Lifestyle     Physical activity:     Days per week: Not on file     Minutes per session: Not on file     Stress: Not on file   Relationships     Social connections:     Talks on phone: Not on file     Gets together: Not on file     Attends Yarsani service: Not on file     Active member of club or organization: Not on file     Attends meetings of clubs or organizations:  Not on file     Relationship status: Not on file     Intimate partner violence:     Fear of current or ex partner: Not on file     Emotionally abused: Not on file     Physically abused: Not on file     Forced sexual activity: Not on file   Other Topics Concern      Service Not Asked     Blood Transfusions Not Asked     Caffeine Concern Yes     Comment: soda 2 cups daily     Occupational Exposure Not Asked     Hobby Hazards Not Asked     Sleep Concern Not Asked     Stress Concern Not Asked     Weight Concern Not Asked     Special Diet Not Asked     Back Care Not Asked     Exercise Not Asked     Bike Helmet Not Asked     Seat Belt Not Asked     Self-Exams Not Asked     Parent/sibling w/ CABG, MI or angioplasty before 65F 55M? Not Asked   Social History Narrative     Not on file       ROS: 10 point ROS neg other than the symptoms noted above in the HPI.  EXAM  Constitutional: healthy, alert and no distress     Psychiatric: mentation appears normal and affect normal/bright     VASCULAR:  -Dorsalis pedis pulse +2/4 b/l  -Posterior tibial pulse +2/4 b/l  -Capillary refill time < 3 seconds to b/l hallux  -Hair growth Present to b/l anterior legs and ankles    NEURO:  -Light touch sensation intact to b/l plantar forefoot    DERM:  -Skin temperature, texture and turgor WNL b/l    MSK:  -Moderate pain on palpation to mid shaft of RIGHT 2nd and LEFT 4th metatarsal (no improvement) from 04/02/2019  ---Mild-to-moderate pain on palpation to RIGHT 3rd metatarsal mid shaft  -High arches bilaterally while patient is NWB  -Muscle strength of ankles +5/5 for dorsiflexion, plantarflexion, ABDUction and ADDuction b/l     RIGHT FOOT RADIOGRAPH 04/02/2019  IMPRESSION: Healing/healed fracture of the distal shaft of the second metatarsal. No acute abnormality.  DION VEGA MD  LEFT FOOT RADIOGRAPH 04/02/2019  FINDINGS: There is lucency through the medial sesamoid, probably a normal variant. No definite fracture is seen and  there is no dislocation. In particular, no fracture is seen in the fourth metatarsal.  There is mild degenerative change in the first metatarsal phalangeal joint and there is mild vascular calcification.                                                                 IMPRESSION: No acute fracture.  DION VEGA MD  ============================================================     ASSESSMENT:  (M84.374A) Stress fracture of metatarsal bone of right foot, initial encounter  (primary encounter diagnosis)     (M84.375A) Stress fracture of metatarsal bone of left foot, initial encounter     (M79.671) Right foot pain     (M79.672) Left foot pain     (M21.6X1,  M21.6X2) Acquired bilateral pes cavus       PLAN:  -Patient evaluated and examined. Treatment options discussed with no educational barriers noted.  -Patient's stress fractures and resulting pain have not improved.  ---Patient needs to be fare more aggressive with offloading of his feet  ---Stressed the importance of offloading the stress fractures in the feet. Patient says he understands and he is in agreement with this plan. He is not open to a cast (for the RIGHT foot) because he lives alone and he says he needs to drive himself to multiple locations.  ---Instructed the patient to wear the long leg CAM boots all day every day whenever he is walking. He is only to remove the RIGHT CAM boot when he is driving, but he is instructed to take his CAM boot with him and apply it as soon as her reaches his destination.  ---Patient has crutches at home. He is to use these to take all weight off the RIGHT foot to improve these healing (RIGHT is more painful than LEFT).  -IBUprofen 800mg prescribed. Patient is instructed only to take this when he really needs it. Tylenol has not helped decrease his pain and IBUprofen may limit healing if he takes it frequently. He says he understands and he will only take it when his pain is uncontrolled at night before bed.    -Stressed  the importance of orthotics. Patient will be more likely to develop another stress fracture if his gait isn't corrected with proper inserts. An appointment was made with the orthotist for 05/14/2019 to be fit for CMOs.  -Orthotist appointment 05/13/2019   -Dispensed work note stating patient needs to wear bilateral CAM boots and use crutches for the RIGHT foot  -Patient in agreement with the above treatment plan and all of patient's questions were answered.        RTC four weeks to evaluate progress  Will consider MRI if patient has been compliant and still has no improvement      Jyoti Steinberg DPM

## 2019-05-22 ENCOUNTER — ANCILLARY PROCEDURE (OUTPATIENT)
Dept: GENERAL RADIOLOGY | Facility: OTHER | Age: 54
End: 2019-05-22
Attending: PODIATRIST
Payer: COMMERCIAL

## 2019-05-22 ENCOUNTER — OFFICE VISIT (OUTPATIENT)
Dept: PODIATRY | Facility: OTHER | Age: 54
End: 2019-05-22
Attending: PODIATRIST
Payer: COMMERCIAL

## 2019-05-22 VITALS
BODY MASS INDEX: 26.48 KG/M2 | WEIGHT: 185 LBS | RESPIRATION RATE: 18 BRPM | TEMPERATURE: 98 F | SYSTOLIC BLOOD PRESSURE: 138 MMHG | HEART RATE: 114 BPM | DIASTOLIC BLOOD PRESSURE: 88 MMHG | HEIGHT: 70 IN | OXYGEN SATURATION: 98 %

## 2019-05-22 DIAGNOSIS — M84.375A STRESS FRACTURE OF METATARSAL BONE OF LEFT FOOT, INITIAL ENCOUNTER: ICD-10-CM

## 2019-05-22 DIAGNOSIS — M84.374A STRESS FRACTURE OF METATARSAL BONE OF RIGHT FOOT, INITIAL ENCOUNTER: ICD-10-CM

## 2019-05-22 DIAGNOSIS — M79.672 LEFT FOOT PAIN: ICD-10-CM

## 2019-05-22 DIAGNOSIS — M79.671 RIGHT FOOT PAIN: ICD-10-CM

## 2019-05-22 DIAGNOSIS — M21.6X2 ACQUIRED BILATERAL PES CAVUS: ICD-10-CM

## 2019-05-22 DIAGNOSIS — M84.375A STRESS FRACTURE OF METATARSAL BONE OF LEFT FOOT, INITIAL ENCOUNTER: Primary | ICD-10-CM

## 2019-05-22 DIAGNOSIS — M21.6X1 ACQUIRED BILATERAL PES CAVUS: ICD-10-CM

## 2019-05-22 PROCEDURE — 73630 X-RAY EXAM OF FOOT: CPT | Mod: TC

## 2019-05-22 PROCEDURE — 99213 OFFICE O/P EST LOW 20 MIN: CPT | Performed by: PODIATRIST

## 2019-05-22 ASSESSMENT — MIFFLIN-ST. JEOR: SCORE: 1700.4

## 2019-05-22 ASSESSMENT — PAIN SCALES - GENERAL: PAINLEVEL: MODERATE PAIN (5)

## 2019-05-22 NOTE — LETTER
May 22, 2019      Tomasz Newman  [address]  [facility] [id number]        To Whom It May Concern:    Tomasz Newman  was seen on 05/22/2019.  Please excuse him  until 06/10/2019 due to bilateral foot injuries. He will be seen again prior to 06/10/2019 to evaluate if he may return sooner or if his return date will be delayed. He can only perform sitting duties at work or standing duties that allow him to wear CAM walker boot on both feet and not standing for longer than 20 minutes at a time with a 30 minute rest between standing.        Sincerely,        Jyoti Steinberg DPM

## 2019-05-22 NOTE — NURSING NOTE
"Chief Complaint   Patient presents with     RECHECK     bilateral foot follow up        Initial /88 (BP Location: Left arm, Patient Position: Sitting, Cuff Size: Adult Large)   Pulse 114   Temp 98  F (36.7  C) (Tympanic)   Resp 18   Ht 1.778 m (5' 10\")   Wt 83.9 kg (185 lb)   SpO2 98%   BMI 26.54 kg/m   Estimated body mass index is 26.54 kg/m  as calculated from the following:    Height as of this encounter: 1.778 m (5' 10\").    Weight as of this encounter: 83.9 kg (185 lb).  Medication Reconciliation: complete    Lou Cerrato LPN    "

## 2019-05-22 NOTE — PROGRESS NOTES
"Chief complaint: Patient presents with:  RECHECK: bilateral foot follow up       History of Present Illness: This 53 year old male is seen for evaluation and suggestions of management of RIGHT foot pain. This is a dull, aching pain on both feet (R>L) last June, 2018. He has been wearing bilateral CAM walker boots sine 04/02/2019. He has been wearing bilateral CAM boots about 12+ hours a day. Overall his pain has improved in both feet. His worst pain reaches a +6 out of 10 on a VAS pain scale.   He had an appointment with the orthotist on 05/13/2019. He is picking up the carbon fiber plate today and he will get his CMOs on 05/13/2019. No further pedal complaints today.     History of foot pain:   Patient says he had a fracture in his RIGHT 2nd metatarsal that developed last June, 2018. He works for [employer] and he walks a lot on his feet. He was walking at work and the pain in in his foot started one day and got worse throughout the day. He went to Urgent Care on 06/06/2018 and an x-ray showed no fracture, so they diagnosed him with gout. He went to an orthopedic at [facility] where he had an MRI that confirmed a fracture of the 2nd metatarsal. They placed him in a CAM boot, but he couldn't tolerate it because he felt off balance. The post-op shoe was open toed so he was not allowed to wear it at work. He couldn't use crutches at work so he didn't offload it at all. The pain continued and was steady until February/March of 2019 when the pain increased.  He did not recall any trauma to the foot.     He also complains of a new dorsal foot pain of his LEFT lateral foot. It is not as painful as his RIGHT foot, but the pain feels similar to how the RIGHT foot foot pain felt when the pain first developed. No further pedal complaints today.       /88 (BP Location: Left arm, Patient Position: Sitting, Cuff Size: Adult Large)   Pulse 114   Temp 98  F (36.7  C) (Tympanic)   Resp 18   Ht 1.778 m (5' 10\")   Wt " 83.9 kg (185 lb)   SpO2 98%   BMI 26.54 kg/m      There is no problem list on file for this patient.      Past Surgical History:   Procedure Laterality Date     HERNIA REPAIR  2002       Current Outpatient Medications   Medication     aspirin 81 MG EC tablet     acetaminophen-codeine (TYLENOL #3) 300-30 MG per tablet     Cyclobenzaprine HCl (FLEXERIL PO)     ibuprofen (ADVIL/MOTRIN) 800 MG tablet     NAPROXEN SODIUM PO     order for DME     No current facility-administered medications for this visit.           Allergies   Allergen Reactions     Percocet [Oxycodone-Acetaminophen]      Facial Swelling       Family History   Problem Relation Age of Onset     Cancer - colorectal Father      Diabetes Brother      Heart Disease Mother         heart disease       Social History     Socioeconomic History     Marital status: Single     Spouse name: Not on file     Number of children: Not on file     Years of education: Not on file     Highest education level: Not on file   Occupational History     Not on file   Social Needs     Financial resource strain: Not on file     Food insecurity:     Worry: Not on file     Inability: Not on file     Transportation needs:     Medical: Not on file     Non-medical: Not on file   Tobacco Use     Smoking status: Never Smoker     Smokeless tobacco: Never Used     Tobacco comment: no passive exposure   Substance and Sexual Activity     Alcohol use: Yes     Comment: wine 3 glasses occasionally     Drug use: Not on file     Sexual activity: Not on file   Lifestyle     Physical activity:     Days per week: Not on file     Minutes per session: Not on file     Stress: Not on file   Relationships     Social connections:     Talks on phone: Not on file     Gets together: Not on file     Attends Evangelical service: Not on file     Active member of club or organization: Not on file     Attends meetings of clubs or organizations: Not on file     Relationship status: Not on file     Intimate partner  violence:     Fear of current or ex partner: Not on file     Emotionally abused: Not on file     Physically abused: Not on file     Forced sexual activity: Not on file   Other Topics Concern      Service Not Asked     Blood Transfusions Not Asked     Caffeine Concern Yes     Comment: soda 2 cups daily     Occupational Exposure Not Asked     Hobby Hazards Not Asked     Sleep Concern Not Asked     Stress Concern Not Asked     Weight Concern Not Asked     Special Diet Not Asked     Back Care Not Asked     Exercise Not Asked     Bike Helmet Not Asked     Seat Belt Not Asked     Self-Exams Not Asked     Parent/sibling w/ CABG, MI or angioplasty before 65F 55M? Not Asked   Social History Narrative     Not on file       ROS: 10 point ROS neg other than the symptoms noted above in the HPI.  EXAM  Constitutional: healthy, alert and no distress     Psychiatric: mentation appears normal and affect normal/bright     VASCULAR:  -Dorsalis pedis pulse +2/4 b/l  -Posterior tibial pulse +2/4 b/l  -Capillary refill time < 3 seconds to b/l hallux  -Hair growth Present to b/l anterior legs and ankles    NEURO:  -Light touch sensation intact to b/l plantar forefoot    DERM:  -Skin temperature, texture and turgor WNL b/l    MSK:  -Moderate pain on palpation to mid shaft of RIGHT 2nd and LEFT 4th metatarsal (no improvement) from 04/02/2019  ---Mild-to-moderate pain on palpation to RIGHT 3rd metatarsal mid shaft  -High arches bilaterally while patient is NWB  -Muscle strength of ankles +5/5 for dorsiflexion, plantarflexion, ABDUction and ADDuction b/l     RIGHT FOOT RADIOGRAPH 04/02/2019  IMPRESSION: Healing/healed fracture of the distal shaft of the second metatarsal. No acute abnormality.  DION VEGA MD  LEFT FOOT RADIOGRAPH 04/02/2019  FINDINGS: There is lucency through the medial sesamoid, probably a normal variant. No definite fracture is seen and there is no dislocation. In particular, no fracture is seen in the fourth  metatarsal.  There is mild degenerative change in the first metatarsal phalangeal joint and there is mild vascular calcification.                                                                 IMPRESSION: No acute fracture.  DION VEGA MD    RIGHT FOOT RADIOGRAPH 05/22/2019                                                                    IMPRESSION: Healed second metatarsal fracture.       MEKHI CARVAJAL MD    LEFT FOOT RADIOGRAPH 05/22/2019                                                                    IMPRESSION: No change from April 2, 2019        MEKHI CARVAJAL MD  ============================================================     ASSESSMENT:  (M84.374A) Stress fracture of metatarsal bone of right foot, initial encounter  (primary encounter diagnosis)     (M84.375A) Stress fracture of metatarsal bone of left foot, initial encounter     (M79.671) Right foot pain     (M79.672) Left foot pain     (M21.6X1,  M21.6X2) Acquired bilateral pes cavus       PLAN:  -Patient evaluated and examined. Treatment options discussed with no educational barriers noted.  -Pain is improving. Patient will start progressing into a carbon fiber foot plate over the next two weeks. He is to attempt to be walking full time with a carbon fiber foot plate with regular shoe gear.   ---If patient is on his feet with minimal pain prior to the appointment, he is to temporarily go back to the boot.  -Orthotist saw patient today and dispensed the carbon fiber foot plate  ---CMOs to be picked up 06/13/2019    -Dispensed work note stating patient needs to be off work until he can stand full time in a regular shoe (06/10/2019)  -Patient in agreement with the above treatment plan and all of patient's questions were answered.        RTC two weeks to evaluate if patient can return to work on 06/10/2019      Jyoti Steinberg DPM

## 2019-06-06 ENCOUNTER — OFFICE VISIT (OUTPATIENT)
Dept: PODIATRY | Facility: OTHER | Age: 54
End: 2019-06-06
Attending: PODIATRIST
Payer: COMMERCIAL

## 2019-06-06 VITALS
HEIGHT: 70 IN | SYSTOLIC BLOOD PRESSURE: 150 MMHG | WEIGHT: 185 LBS | BODY MASS INDEX: 26.48 KG/M2 | DIASTOLIC BLOOD PRESSURE: 91 MMHG | TEMPERATURE: 97.9 F | HEART RATE: 111 BPM

## 2019-06-06 DIAGNOSIS — M84.374A STRESS FRACTURE OF METATARSAL BONE OF RIGHT FOOT, INITIAL ENCOUNTER: Primary | ICD-10-CM

## 2019-06-06 DIAGNOSIS — M21.6X2 ACQUIRED BILATERAL PES CAVUS: ICD-10-CM

## 2019-06-06 DIAGNOSIS — M79.671 RIGHT FOOT PAIN: ICD-10-CM

## 2019-06-06 DIAGNOSIS — M79.672 LEFT FOOT PAIN: ICD-10-CM

## 2019-06-06 DIAGNOSIS — M84.375A STRESS FRACTURE OF METATARSAL BONE OF LEFT FOOT, INITIAL ENCOUNTER: ICD-10-CM

## 2019-06-06 DIAGNOSIS — M21.6X1 ACQUIRED BILATERAL PES CAVUS: ICD-10-CM

## 2019-06-06 PROCEDURE — 99213 OFFICE O/P EST LOW 20 MIN: CPT | Performed by: PODIATRIST

## 2019-06-06 ASSESSMENT — MIFFLIN-ST. JEOR: SCORE: 1700.4

## 2019-06-06 ASSESSMENT — PAIN SCALES - GENERAL: PAINLEVEL: SEVERE PAIN (6)

## 2019-06-06 NOTE — PROGRESS NOTES
"Chief complaint: Patient presents with:  RECHECK: f/u stress fractures of left and right metatarsul bones      History of Present Illness: This 53 year old male is seen for evaluation and suggestions of management of bilateral foot pain. Both feet are equally painful today and reach a +6 out of 10 on a VAS pain scale. He has been wearing a carbon fiber plate. He started wearing it full time starting two weeks ago. The plate is uncomfortable and is currently not decreasing the foot pain. At work, he will be able to wear regular tennis shoes, but he has not yet returned to work due to pain in both of his feet. He is still sometimes wearing the CAM boot at home to decrease his pain. He has not been very active. He is not noticing much improvement. He will get his CMOs on Thursday, 06/13/2019. No further pedal complaints today.     History of foot pain:   Patient says he had a fracture in his RIGHT 2nd metatarsal that developed last June, 2018. He works for [employer] and he walks a lot on his feet. He was walking at work and the pain in in his foot started one day and got worse throughout the day. He went to Urgent Care on 06/06/2018 and an x-ray showed no fracture, so they diagnosed him with gout. He went to an orthopedic at [facility] where he had an MRI that confirmed a fracture of the 2nd metatarsal. They placed him in a CAM boot, but he couldn't tolerate it because he felt off balance. The post-op shoe was open toed so he was not allowed to wear it at work. He couldn't use crutches at work so he didn't offload it at all. The pain continued and was steady until February/March of 2019 when the pain increased.  He did not recall any trauma to the foot.      BP (!) 150/91   Pulse 111   Temp 97.9  F (36.6  C) (Tympanic)   Ht 1.778 m (5' 10\")   Wt 83.9 kg (185 lb)   BMI 26.54 kg/m      There is no problem list on file for this patient.      Past Surgical History:   Procedure Laterality Date     HERNIA REPAIR  " 2002       Current Outpatient Medications   Medication     aspirin 81 MG EC tablet     ibuprofen (ADVIL/MOTRIN) 800 MG tablet     acetaminophen-codeine (TYLENOL #3) 300-30 MG per tablet     Cyclobenzaprine HCl (FLEXERIL PO)     NAPROXEN SODIUM PO     order for DME     No current facility-administered medications for this visit.           Allergies   Allergen Reactions     Percocet [Oxycodone-Acetaminophen]      Facial Swelling       Family History   Problem Relation Age of Onset     Cancer - colorectal Father      Diabetes Brother      Heart Disease Mother         heart disease       Social History     Socioeconomic History     Marital status: Single     Spouse name: Not on file     Number of children: Not on file     Years of education: Not on file     Highest education level: Not on file   Occupational History     Not on file   Social Needs     Financial resource strain: Not on file     Food insecurity:     Worry: Not on file     Inability: Not on file     Transportation needs:     Medical: Not on file     Non-medical: Not on file   Tobacco Use     Smoking status: Never Smoker     Smokeless tobacco: Never Used     Tobacco comment: no passive exposure   Substance and Sexual Activity     Alcohol use: Yes     Comment: wine 3 glasses occasionally     Drug use: Not on file     Sexual activity: Not on file   Lifestyle     Physical activity:     Days per week: Not on file     Minutes per session: Not on file     Stress: Not on file   Relationships     Social connections:     Talks on phone: Not on file     Gets together: Not on file     Attends Zoroastrian service: Not on file     Active member of club or organization: Not on file     Attends meetings of clubs or organizations: Not on file     Relationship status: Not on file     Intimate partner violence:     Fear of current or ex partner: Not on file     Emotionally abused: Not on file     Physically abused: Not on file     Forced sexual activity: Not on file   Other  Topics Concern      Service Not Asked     Blood Transfusions Not Asked     Caffeine Concern Yes     Comment: soda 2 cups daily     Occupational Exposure Not Asked     Hobby Hazards Not Asked     Sleep Concern Not Asked     Stress Concern Not Asked     Weight Concern Not Asked     Special Diet Not Asked     Back Care Not Asked     Exercise Not Asked     Bike Helmet Not Asked     Seat Belt Not Asked     Self-Exams Not Asked     Parent/sibling w/ CABG, MI or angioplasty before 65F 55M? Not Asked   Social History Narrative     Not on file       ROS: 10 point ROS neg other than the symptoms noted above in the HPI.  EXAM  Constitutional: healthy, alert and no distress     Psychiatric: mentation appears normal and affect normal/bright     VASCULAR:  -Dorsalis pedis pulse +2/4 b/l  -Posterior tibial pulse +2/4 b/l  -Capillary refill time < 3 seconds to b/l hallux  -Hair growth Present to b/l anterior legs and ankles    NEURO:  -Light touch sensation intact to b/l plantar forefoot    DERM:  -Skin temperature, texture and turgor WNL b/l    MSK:  -Increased pain on palpation to mid shaft of RIGHT 2nd and LEFT 4th metatarsal (no improvement) from 04/02/2019  ---Mild-to-moderate pain on palpation to RIGHT 3rd metatarsal mid shaft  -High arches bilaterally while patient is NWB  -Muscle strength of ankles +5/5 for dorsiflexion, plantarflexion, ABDUction and ADDuction b/l     RIGHT FOOT RADIOGRAPH 04/02/2019  IMPRESSION: Healing/healed fracture of the distal shaft of the second metatarsal. No acute abnormality.  DION VEGA MD  LEFT FOOT RADIOGRAPH 04/02/2019  FINDINGS: There is lucency through the medial sesamoid, probably a normal variant. No definite fracture is seen and there is no dislocation. In particular, no fracture is seen in the fourth metatarsal.  There is mild degenerative change in the first metatarsal phalangeal joint and there is mild vascular  calcification.                                                                 IMPRESSION: No acute fracture.  DION VEGA MD    RIGHT FOOT RADIOGRAPH 05/22/2019                                                                    IMPRESSION: Healed second metatarsal fracture.       MEKHI CARVAJAL MD    LEFT FOOT RADIOGRAPH 05/22/2019                                                                    IMPRESSION: No change from April 2, 2019        MEKHI CARVAJAL MD  ============================================================     ASSESSMENT:  (M84.374A) Stress fracture of metatarsal bone of right foot, initial encounter  (primary encounter diagnosis)     (M84.375A) Stress fracture of metatarsal bone of left foot, initial encounter     (M79.671) Right foot pain     (M79.672) Left foot pain     (M21.6X1,  M21.6X2) Acquired bilateral pes cavus       PLAN:  -Patient evaluated and examined. Treatment options discussed with no educational barriers noted.  -Patient is walking in his tennis shoes with increased pain to both feet. He is wearing the carbon fiber foot plate but it is not decreasing his pain.  ---CMOs: Patient will  his CMOs on Thursday, 06/13/2019.  Patient educated on slowly transitioning into the inserts so the CMOs do not aggravate his foot pain. This will likely start to decrease his pain along with the carbon fiber foot plate.  -Advised to continue resting feet until patient receives CMOs. Will evaluate foot pain in three weeks to see if he can return to work.  -Physical therapy referral placed: Work on strengthening intrinsic muscles of feet, gastroc stretching, and stability of posterior tibial tendon and peroneal tendons.     -Dispensed work note stating patient needs to be off work until he can stand full time in a regular shoe with his inserts--he will be seen again on 06/27/2019.  -Patient in agreement with the above treatment plan and all of patient's questions were  answered.        RTC three weeks on 06/27/2019 to evaluate if patient can return to work       Jyoti Steinberg DPM

## 2019-06-06 NOTE — LETTER
June 6, 2019      Tomasz Newman  [address]  [facility] [id number]        To Whom It May Concern:    Tomasz Newman  was seen on 06/06/2019. He is still not fully recovered. 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         Sincerely,        Jyoti Steinberg DPM

## 2019-06-06 NOTE — NURSING NOTE
"Chief Complaint   Patient presents with     RECHECK     f/u stress fractures of left and right metatarsul bones       Initial BP (!) 150/91   Pulse 111   Temp 97.9  F (36.6  C) (Tympanic)   Ht 1.778 m (5' 10\")   Wt 83.9 kg (185 lb)   BMI 26.54 kg/m   Estimated body mass index is 26.54 kg/m  as calculated from the following:    Height as of this encounter: 1.778 m (5' 10\").    Weight as of this encounter: 83.9 kg (185 lb).  Medication Reconciliation: complete    Carolina Dale LPN  "

## 2019-06-19 ENCOUNTER — HOSPITAL ENCOUNTER (OUTPATIENT)
Dept: PHYSICAL THERAPY | Facility: HOSPITAL | Age: 54
Setting detail: THERAPIES SERIES
End: 2019-06-19
Attending: PODIATRIST
Payer: COMMERCIAL

## 2019-06-19 DIAGNOSIS — M79.671 RIGHT FOOT PAIN: ICD-10-CM

## 2019-06-19 DIAGNOSIS — M79.672 LEFT FOOT PAIN: ICD-10-CM

## 2019-06-19 DIAGNOSIS — M21.6X1 ACQUIRED BILATERAL PES CAVUS: ICD-10-CM

## 2019-06-19 DIAGNOSIS — M84.375A STRESS FRACTURE OF METATARSAL BONE OF LEFT FOOT, INITIAL ENCOUNTER: ICD-10-CM

## 2019-06-19 DIAGNOSIS — M84.374A STRESS FRACTURE OF METATARSAL BONE OF RIGHT FOOT, INITIAL ENCOUNTER: ICD-10-CM

## 2019-06-19 DIAGNOSIS — M21.6X2 ACQUIRED BILATERAL PES CAVUS: ICD-10-CM

## 2019-06-19 PROCEDURE — 97110 THERAPEUTIC EXERCISES: CPT | Mod: GP

## 2019-06-19 PROCEDURE — 97162 PT EVAL MOD COMPLEX 30 MIN: CPT | Mod: GP

## 2019-06-19 NOTE — PROGRESS NOTES
Initial Physical Therapy Evaluation      Name: Tomasz Newman MRN# [id number]   Age: 53 year old YOB: 1965     Date of Consultation: June 19, 2019  Primary care provider: Blanco Vazquez    Referring Physician: Dr. Steinberg   Orders:  Physician Recommendations: Patient has been in a CAM boot bilaterally for an extended period of time due to bilateral stress fractures. He will be fit for CMOs on 06/13/2019 and he is trying to return to work. Please work on intrinsic muscle strengthening of bilateral feet, gastroc stretching bilaterally to decrease plantar forefoot pressure, strengthening/ROM/proprioceptoin with the ankles, iontophoresis to painful areas of bilateral dorsal midfoot, and other modalities per your recommendation.   If possible, please see patient as soon as possible. Please provide exercises for him to do at home. He will unlikely be able to f/u with PT once he resumes work within a month. Thank you.  Medical Diagnosis:   Stress fracture of metatarsal bone of right foot, initial encounter [M84.374A]       Stress fracture of metatarsal bone of left foot, initial encounter [M84.375A]       Right foot pain [M79.671]       Left foot pain [M79.672]       Acquired bilateral pes cavus [M21.6X1, M21.6X2]         Onset of Illness/Injury: 7/2018 and 4/2019    Reason for PT Visit: Patient is a 54 y/o male who presents with hx of bilateral stress fractures to metatarsals in bilateral feet. Currently has healing R 2nd metatarsal fx and L 4th metatarsal fx. Symptoms begain on R foot  In 7/2018. Patient was originally misdiagnosed with gout until he saw an orthopedic specialist and was then diagnosed with 2nd metatarsal stress fracture. For this original injury he used a CAM walking boot  For 4 weeks-6 weeks. Symptoms improved, he was at work and was not noticing much symptoms. Then in April of 2019, he had onset of return of symptoms in R foot as well as addition of pain in L foot. Patient returned  to orthopedic specialist and was diagnosed with bilateral stress fractures. He has not worked since April and was placed in bilateral CAM walking boots and use of crutches for 4 weeks-6 weeks.     Patient is beginning to plan for return to work and is having continued pain at site of respective stress fractures.   Same level 5-6/10 pain currently. 8/10 pain at its worst when performing prolonged walking. Pain is never less than 5/10. Patient got custom orthotics 2 weeks ago. Patient is having issues with metatarsal pain and will see Dr. Steinberg next week to discuss and see if it can be adjusted.     Jaspreet works at [employer] and this job requires prolonged time spent on his feet with prolonged walking and standing. Patient can wear any shoe and use ESD foot strap or use a special ESD shoe.     Patient likes to perform gardening, fishing, time outdoors. Activities that require prolonged walking and standing on uneven ground.     Patient has aching pain at night that is constantly present at sites of stress fractures.    Has some numbness from mid 2nd metatarsal down that comes and goes. Notices flexion contracture forming on 2nd digit. Feet twitch and tremor when pain is at high intensity.     Prior Level of Function: Patient was active, prolonged walking at work.   Pain: 5-10/10  Aching, to sharp with overpressure    Community Support/Living Environment/Employment History: employed at [employer]     Patient/Family Goal: To return to work and tolerate activity without significant pain in bilateral feet.     Fall Screen:   Have you fallen 2 or more times in the last year? No  Have you fallen and had an injury in the last year? No  Timed up & go: NT  Is patient a fall risk? No    Past Medical History:   No past medical history on file.    Past Surgical History:  Past Surgical History:   Procedure Laterality Date     HERNIA REPAIR  2002       Medications:   Current Outpatient Medications   Medication Sig      acetaminophen-codeine (TYLENOL #3) 300-30 MG per tablet Take 1 tablet by mouth every 6 hours as needed for severe pain     aspirin 81 MG EC tablet Take 81 mg by mouth daily     Cyclobenzaprine HCl (FLEXERIL PO) Take 10 mg by mouth 3 times daily as needed for muscle spasms     ibuprofen (ADVIL/MOTRIN) 800 MG tablet Take 1 tablet (800 mg) by mouth every 8 hours as needed for moderate pain     NAPROXEN SODIUM PO Take 550 mg by mouth     order for DME Equipment being ordered: Please dispense a long leg CAM walker boot for BOTH legs. Please also dispense a walker or another assistive device if needed for instability. (Patient not taking: Reported on 5/22/2019)     No current facility-administered medications for this encounter.        Imaging:     Musculoskeletal Findings:     OBJECTIVE   Observation:   Patient appears to PT in no acute distress      Palpation: Significant tenderness to site of fracture on R 2nd metatarsal and surrounding areas and same level of tenderness at 4th metatarsal on L. Non-tender to plantar surface of foot.     Did not appreciate significant tension at FDB or plantar fascia.     Pain with any mobilization of metatarsals even if effected metatarsals are not the ones being mobilized.     Sensitivity to light touch on skin over stress fracture sites.       Gait: Patient has tendency to ambulate with feet in inversion/supination as patient is trying to avoid pain and contact/involvment of forefoot during gait cycle.      Balance: single leg stance, poor balance bilaterally with patient standing with inversion/supination compensation.     Pes cavus structured feet present. Mild to moderate edema noted on lateral surface of foot bilaterally extending into forefoot.       Ankle Range of Motion  And Strength    R ankle - Active ROM              Dorsiflexion        Limited due to GS/Soleus tension         Plantarflexion    Full         Inversion             Full         Eversion               Full               Strength: (_/5)    Dorsiflexion    4-/5 due to pain at stress fracture site 2nd metatarsal  Plantarflexion 4-5/5 due to pain at stress fracture site 2nd metatarsal  Inversion        4-/5 due to pain at stress fracture site 2nd metatarsal  Eversion        4-/5 due to pain at stress fracture site 2nd metatarsal       L ankle - Active ROM    Dorsiflexion        Limited due to GS/Soleus tension         Plantarflexion    Full         Inversion             Full         Eversion               Full                            Strength: (_/5)    Dorsiflexion    4-/5 limited due to pain at 4th metatarsal   Plantarflexion  4-/5 limited due to pain at 4th metatarsal   Inversion         4-/5 limited due to pain at 4th metatarsal   Eversion         4-/5 limited due to pain at 4th metatarsal     Great toe extension ROM: WFL    Rearfoot mobility assessment: no significant limitation present bilaterally      Prognosis/Plan of Care: Guarded, significant pain and low tolerance for exercises  Appropriate for Physical Therapy Intervention: Yes     GOALS:   Short-term goals:  To be achieved in 4 weeks:    1.) Patient will report 50% improvement of overall symptoms to allow safe return to PLOF  2.) Patient will increase active DF to full functional motion allowing performance of stairs and gait with normal gait pattern.      Long-term goals:  To be achieved in 8  weeks:  1.) Patient will reduce pain level to 1-2/10 to allow increased ability to stand and perform daily activities with less pain  2.) Patient will report 80% improvement of overall symptoms and pain in ankle to allow increased ability to perform daily activities with reduced pain and irritation.  3.) Patient will be able to tolerate work related tasks such as prolonged standing and walking without limitation from pain or edema in bilateral feet.   4). Patient will be able to return to desired recreational activities that involve ambulation over uneven surfaces with  good tolerance.        Discharge goals: Independent with use of HEP outside of clinic      Planned Interventions: Therapeutic exercise, manual therapy, therapeutic activity, patient education, ionto as needed for pain      Treatment Rendered/Intervention:  Evaluation completed as described above followed by discussion of exam findings and plan of care.    Therapeutic exercise: Patient instructed in and demonstrated proper performance of home exercise program consisting of: GS and Soleus stretching. Unable to tolerate foot intrinsic strengthening today. Increased pain from 6/10 to 9-10/10.     Educated in posture principles and neutral spine positioning. Patient was instructed in home use of heat and/or ice for pain management    Clinical Impressions:  Criteria for Skilled Therapeutic Intervention Met: Yes   PT Diagnosis: Pain, weakness and balance deficits following bilateral metatarsal stress fractuures  Influenced by the following impairments: Pain, weakness, balance deficits  Functional limitations due to impairment: significant pain and difficulty with prolonged walking and standing.   Clinical presentation: Evolving/Changing  Clinical presentation rationale: clinical judgement  Clinical Decision making (complexity): Moderate Complexity  Predicted Duration of Therapy Intervention (days/wks): 8 weeks  Risks and Benefits of therapy have been explained: Yes  Patient, Family & other staff in agreement with plan of care: Yes    Frequency: 1-2 times/week        Total Evaluation Time: 25    I certify the need for these services furnished under this plan of treatment and while under my care. (Physician co-signature of this document indicates review and certification of the therapy plan).

## 2019-06-21 ENCOUNTER — HOSPITAL ENCOUNTER (OUTPATIENT)
Dept: PHYSICAL THERAPY | Facility: HOSPITAL | Age: 54
Setting detail: THERAPIES SERIES
End: 2019-06-21
Attending: PODIATRIST
Payer: COMMERCIAL

## 2019-06-21 PROCEDURE — 97530 THERAPEUTIC ACTIVITIES: CPT | Mod: GP

## 2019-06-22 ENCOUNTER — TELEPHONE (OUTPATIENT)
Dept: PODIATRY | Facility: OTHER | Age: 54
End: 2019-06-22

## 2019-06-22 NOTE — TELEPHONE ENCOUNTER
PT Update: Patient started PT services and has had two sessions. Unfortunately, patient has very limited tolerance for exercises. Trialed seated GS/Soleus stretch, seated foot tenting for arch intrinsic strengthening and seated toe splay. All of these exercises create 9-10/10 pain at site of past stress fractures.     With further discussion with patient concerning PMH, discovered that patient was diagnosed with RSD (reflex sympathetic dystrophy syndrome) for bilateral UEs symptoms in 2006. It is possible that hypersensitization of nerves and pain response is currently present in bilateral feet.     Initiated contrast baths and desensitization training to determine if pain response can be ramped down to allow patient to tolerate low level exercises.     Patient states any movement of feet creates significant pain and impacts his gait pattern. Pain science education may also be appropriate for patient as pain response is significantly heightened compared to what would be expected for this stage of his recovery.     Please contact PT with any questions or recommendations concerning POC moving forward.

## 2019-06-24 ENCOUNTER — HOSPITAL ENCOUNTER (OUTPATIENT)
Dept: PHYSICAL THERAPY | Facility: HOSPITAL | Age: 54
Setting detail: THERAPIES SERIES
End: 2019-06-24
Attending: PODIATRIST
Payer: COMMERCIAL

## 2019-06-24 PROCEDURE — 97033 APP MDLTY 1+IONTPHRSIS EA 15: CPT | Mod: GP

## 2019-06-27 ENCOUNTER — OFFICE VISIT (OUTPATIENT)
Dept: PODIATRY | Facility: OTHER | Age: 54
End: 2019-06-27
Attending: PODIATRIST
Payer: COMMERCIAL

## 2019-06-27 VITALS
HEART RATE: 115 BPM | TEMPERATURE: 98.2 F | OXYGEN SATURATION: 96 % | SYSTOLIC BLOOD PRESSURE: 150 MMHG | DIASTOLIC BLOOD PRESSURE: 86 MMHG

## 2019-06-27 DIAGNOSIS — M84.374A STRESS FRACTURE OF METATARSAL BONE OF RIGHT FOOT, INITIAL ENCOUNTER: ICD-10-CM

## 2019-06-27 DIAGNOSIS — M21.6X2 ACQUIRED BILATERAL PES CAVUS: ICD-10-CM

## 2019-06-27 DIAGNOSIS — M79.671 RIGHT FOOT PAIN: ICD-10-CM

## 2019-06-27 DIAGNOSIS — M79.672 LEFT FOOT PAIN: ICD-10-CM

## 2019-06-27 DIAGNOSIS — M84.375A STRESS FRACTURE OF METATARSAL BONE OF LEFT FOOT, INITIAL ENCOUNTER: ICD-10-CM

## 2019-06-27 DIAGNOSIS — M21.6X1 ACQUIRED BILATERAL PES CAVUS: ICD-10-CM

## 2019-06-27 DIAGNOSIS — G90.523 COMPLEX REGIONAL PAIN SYNDROME TYPE 1 OF BOTH LOWER EXTREMITIES: Primary | ICD-10-CM

## 2019-06-27 PROCEDURE — 99213 OFFICE O/P EST LOW 20 MIN: CPT | Performed by: PODIATRIST

## 2019-06-27 ASSESSMENT — PAIN SCALES - GENERAL: PAINLEVEL: SEVERE PAIN (6)

## 2019-06-27 NOTE — PROGRESS NOTES
Chief complaint: Patient presents with:  Musculoskeletal Problem: pain bilateral feet, fxs      History of Present Illness: This 53 year old male is seen for evaluation and suggestions of management of bilateral foot pain. Both feet are equally painful today and reach a +7 out of 10 on a VAS pain scale. He has been wearing a CMO, but the met pad has been too prominent and increasing his pain, so the orthotist, Kathy Bang, is modifying his CMOs today. The CMOs otherwise are very comfortable and decrease pressure on his heels when he is walking with them.    Patient has been seeing PT and the patient mentioned to PT that he has had a h/o CRPS with his upper extremities. PT send a message stating the patient is now displaying symptoms of CRPS.  He had this in 2006 in his hands after doing repetitive motions at work. He can't remember how it was treated.     At PT recently for his legs, he was having his calves stretched, but it caused cramping so they stopped. He then had hot/cold contrast baths and he recently had a TENS unit on the feet. He has not noticed any improvement with his pain. His pain is only relieved when he is sitting without pressure on his feet. No further pedal complaints today.       History of foot pain:   Patient says he had a fracture in his RIGHT 2nd metatarsal that developed last June, 2018. He works for [employer] and he walks a lot on his feet. He was walking at work and the pain in in his foot started one day and got worse throughout the day. He went to Urgent Care on 06/06/2018 and an x-ray showed no fracture, so they diagnosed him with gout. He went to an orthopedic at [facility] where he had an MRI that confirmed a fracture of the 2nd metatarsal. They placed him in a CAM boot, but he couldn't tolerate it because he felt off balance. The post-op shoe was open toed so he was not allowed to wear it at work. He couldn't use crutches at work so he didn't offload it at all. The pain continued  and was steady until February/March of 2019 when the pain increased.  He did not recall any trauma to the foot.      /86 (BP Location: Right arm, Patient Position: Sitting, Cuff Size: Adult Regular)   Pulse 115   Temp 98.2  F (36.8  C) (Tympanic)   SpO2 96%     There is no problem list on file for this patient.      Past Surgical History:   Procedure Laterality Date     HERNIA REPAIR  2002       Current Outpatient Medications   Medication     acetaminophen-codeine (TYLENOL #3) 300-30 MG per tablet     aspirin 81 MG EC tablet     Cyclobenzaprine HCl (FLEXERIL PO)     ibuprofen (ADVIL/MOTRIN) 800 MG tablet     NAPROXEN SODIUM PO     order for DME     No current facility-administered medications for this visit.           Allergies   Allergen Reactions     Percocet [Oxycodone-Acetaminophen]      Facial Swelling       Family History   Problem Relation Age of Onset     Cancer - colorectal Father      Diabetes Brother      Heart Disease Mother         heart disease       Social History     Socioeconomic History     Marital status: Single     Spouse name: Not on file     Number of children: Not on file     Years of education: Not on file     Highest education level: Not on file   Occupational History     Not on file   Social Needs     Financial resource strain: Not on file     Food insecurity:     Worry: Not on file     Inability: Not on file     Transportation needs:     Medical: Not on file     Non-medical: Not on file   Tobacco Use     Smoking status: Never Smoker     Smokeless tobacco: Never Used     Tobacco comment: no passive exposure   Substance and Sexual Activity     Alcohol use: Yes     Comment: wine 3 glasses occasionally     Drug use: Not on file     Sexual activity: Not on file   Lifestyle     Physical activity:     Days per week: Not on file     Minutes per session: Not on file     Stress: Not on file   Relationships     Social connections:     Talks on phone: Not on file     Gets together: Not on  file     Attends Jewish service: Not on file     Active member of club or organization: Not on file     Attends meetings of clubs or organizations: Not on file     Relationship status: Not on file     Intimate partner violence:     Fear of current or ex partner: Not on file     Emotionally abused: Not on file     Physically abused: Not on file     Forced sexual activity: Not on file   Other Topics Concern      Service Not Asked     Blood Transfusions Not Asked     Caffeine Concern Yes     Comment: soda 2 cups daily     Occupational Exposure Not Asked     Hobby Hazards Not Asked     Sleep Concern Not Asked     Stress Concern Not Asked     Weight Concern Not Asked     Special Diet Not Asked     Back Care Not Asked     Exercise Not Asked     Bike Helmet Not Asked     Seat Belt Not Asked     Self-Exams Not Asked     Parent/sibling w/ CABG, MI or angioplasty before 65F 55M? Not Asked   Social History Narrative     Not on file       ROS: 10 point ROS neg other than the symptoms noted above in the HPI.  EXAM  Constitutional: healthy, alert and no distress     Psychiatric: mentation appears normal and affect normal/bright     VASCULAR:  -Dorsalis pedis pulse +2/4 b/l  -Posterior tibial pulse +2/4 b/l  -Capillary refill time < 3 seconds to b/l hallux  -Hair growth Present to b/l anterior legs and ankles    NEURO:  -Light touch sensation intact to b/l plantar forefoot    DERM:  -Skin temperature, texture and turgor WNL b/l    MSK:  -Moderate pain on palpation to mid shaft of RIGHT 2nd and LEFT 4th metatarsal (no improvement from 06/06/2019)  ---Mild-to-moderate pain on palpation to RIGHT 3rd metatarsal mid shaft  -High arches bilaterally while patient is NWB  -Muscle strength of ankles +5/5 for dorsiflexion, plantarflexion, ABDUction and ADDuction b/l     RIGHT FOOT RADIOGRAPH 04/02/2019  IMPRESSION: Healing/healed fracture of the distal shaft of the second metatarsal. No acute abnormality.  DION VEGA,  MD  LEFT FOOT RADIOGRAPH 04/02/2019  FINDINGS: There is lucency through the medial sesamoid, probably a normal variant. No definite fracture is seen and there is no dislocation. In particular, no fracture is seen in the fourth metatarsal.  There is mild degenerative change in the first metatarsal phalangeal joint and there is mild vascular calcification.                                                                 IMPRESSION: No acute fracture.  DION VEGA MD    RIGHT FOOT RADIOGRAPH 05/22/2019                                                                    IMPRESSION: Healed second metatarsal fracture.       MEKHI CARVAJAL MD    LEFT FOOT RADIOGRAPH 05/22/2019                                                                    IMPRESSION: No change from April 2, 2019        MEKHI CARVAJAL MD  ============================================================     ASSESSMENT:  (G90.523) Complex regional pain syndrome type 1 of both lower extremities  (primary encounter diagnosis)    (M84.375A) Stress fracture of metatarsal bone of left foot, initial encounter    (M84.374A) Stress fracture of metatarsal bone of right foot, initial encounter     (M79.671) Right foot pain     (M79.672) Left foot pain     (M21.6X1,  M21.6X2) Acquired bilateral pes cavus      PLAN:  -Patient evaluated and examined. Treatment options discussed with no educational barriers noted.  -Patient is walking in his tennis shoes with no improvement in pain. His physical therapy regimen just recently transitioned to treating CRPS at the last visit. Will have him continue PT for a couple more visits and will have the orthotist, Kathy Bang, modify his inserts and evaluate the patient's pain level in three weeks.  -It is very unlikely the pain continues to stem from the old stress fractures. The pain has not improved and there is no further evidence of a musculoskeletal pathology that is causing his pain.  -Orthotist modification: Met pads  removed from CMOs today  -Bilateral WB radiographs ordered for patient to obtain prior to next appointment  -If patient has had minimal/no improvement at his f/u appointment in three weeks after exhausting PT and CMOs, he will be referred to a pain clinic (closest is [facility] in [place]) for management of CRPS. No further treatment options can be offered for the patient from podiatry if he has no pain relief by his f/u. Patient says he understands.  -Work note dispensed today, but no further work excuse notes will be dispensed after patient's f/u appointment.  -Patient in agreement with the above treatment plan and all of patient's questions were answered.        RTC three weeks on 07/18/2019 with x-rays prior to appointment  ---Will refer patient to pain clinic and will not dispense further work notes if pain has not improvement by next appointment after more PT and after CMO adjustments (made 06/27/2019)      Jyoti Steinberg DPM

## 2019-06-27 NOTE — NURSING NOTE
"Chief Complaint   Patient presents with     Musculoskeletal Problem     pain bilateral feet, fxs       Initial /86 (BP Location: Right arm, Patient Position: Sitting, Cuff Size: Adult Regular)   Pulse 115   Temp 98.2  F (36.8  C) (Tympanic)   SpO2 96%  Estimated body mass index is 26.54 kg/m  as calculated from the following:    Height as of 6/6/19: 1.778 m (5' 10\").    Weight as of 6/6/19: 83.9 kg (185 lb).  Medication Reconciliation: complete  "

## 2019-06-27 NOTE — LETTER
June 27, 2019      Tomasz Newman  [address]  [place] [facility] [address]        To Whom It May Concern:    Tomasz Newman  was seen on 06/27/2019.  Please excuse him  until 07/18/2019 due to a foot injury. He will be seen by podiatry on 07/18/2019 for his final podiatry visit.         Sincerely,        Jyoti Steinberg DPM

## 2019-07-15 ENCOUNTER — HOSPITAL ENCOUNTER (OUTPATIENT)
Dept: PHYSICAL THERAPY | Facility: HOSPITAL | Age: 54
Setting detail: THERAPIES SERIES
End: 2019-07-15
Attending: PODIATRIST
Payer: COMMERCIAL

## 2019-07-15 PROCEDURE — 97110 THERAPEUTIC EXERCISES: CPT | Mod: GP

## 2019-07-15 NOTE — PROGRESS NOTES
07/15/19 0800   Signing Clinician's Name / Credentials   Signing clinician's name / credentials Ruth Ledbetter, JESSICA   Session Number   Session Number 4/60   Subjective Report   Subjective Report 2/10 for site of fractures. Patient states underside of foot near heel within plantarfascia is 3/10 and increased with activity. Has been doing more outside, but is still limited in duration. Patient states that he cannot wear orthotics all the time. Patient can wear them for only one hour and then must take them out. Patient states that he is limited to one hour of prolonged standing and walking. Patient does have some concerns about how he would tolerate work tasks. Has upcoming follow up with Dr. Steinberg.    Treatment Interventions   Interventions Therapeutic Procedure/Exercise   Therapeutic Procedure/exercise   Therapeutic Procedures: strength, endurance, ROM, flexibillity minutes (59221) 26   Skilled Intervention Ther Ex, Patient Education, Home program development   Patient Response Good and appropriate.    Treatment Detail Assessment reveals region of greatest symptoms present at R plantarfascia near attachment to calcaneus bilaterally with R more symptomatic than L. Able to palpate and perform squeeze test at site of healed stress fractures without increased pain. Progressed to dynamic stability exercises to facilitate return to work through improved balance and strength. Single leg Edurance challenge: single leg stance with opposite LE moving forward/back, side to side, clockwise and counter clockwise circles. Patient performed 2 sets each LE with good tolerance. Standing towel heel raises stopping at neutral 2 x 12. Step up on marianne disc x 15 bilat. Self plantar fascia stretch 10 sec x 3 bilateral LEs.    Assessments Completed   Assessments Completed Following exercises pain at plantarfascia had increased. Requested patient continue with HEP, but stop if pain becomes too severe or lasts greater than 2 hours  after exercises. Provided graded range for exercises with lower frequency intitally and pending good response increasing frequency. Requested patient bring exercises handouts to Dr. Steinberg's office to show her what he is currently working on. Patient has one additional sessions scheduled, but states if he returns to work he will have difficulty attending. May need to follow up with patient over the phone to monitor response.   Plan   Home program Single leg stance with opposite LE moving side to side, forward back, circles. Double leg heel raise on towel to neutral. Self plantar fascia stretch.    Plan for next session 1 additional PT session is scheduled at this time.    Total Session Time   Timed Code Treatment Minutes 26   Total Treatment Time (sum of timed and untimed services) 26

## 2019-07-18 ENCOUNTER — OFFICE VISIT (OUTPATIENT)
Dept: PODIATRY | Facility: OTHER | Age: 54
End: 2019-07-18
Attending: PODIATRIST
Payer: COMMERCIAL

## 2019-07-18 ENCOUNTER — HOSPITAL ENCOUNTER (OUTPATIENT)
Dept: GENERAL RADIOLOGY | Facility: OTHER | Age: 54
End: 2019-07-18
Attending: PODIATRIST
Payer: COMMERCIAL

## 2019-07-18 ENCOUNTER — APPOINTMENT (OUTPATIENT)
Dept: LAB | Facility: OTHER | Age: 54
End: 2019-07-18
Attending: PODIATRIST
Payer: COMMERCIAL

## 2019-07-18 VITALS
HEART RATE: 82 BPM | SYSTOLIC BLOOD PRESSURE: 146 MMHG | DIASTOLIC BLOOD PRESSURE: 89 MMHG | RESPIRATION RATE: 16 BRPM | TEMPERATURE: 98.1 F

## 2019-07-18 DIAGNOSIS — M72.2 PLANTAR FASCIAL FIBROMATOSIS: Primary | ICD-10-CM

## 2019-07-18 DIAGNOSIS — M79.671 RIGHT FOOT PAIN: ICD-10-CM

## 2019-07-18 DIAGNOSIS — M84.374A STRESS FRACTURE OF METATARSAL BONE OF RIGHT FOOT, INITIAL ENCOUNTER: ICD-10-CM

## 2019-07-18 DIAGNOSIS — M21.6X2 ACQUIRED BILATERAL PES CAVUS: ICD-10-CM

## 2019-07-18 DIAGNOSIS — M21.6X1 ACQUIRED BILATERAL PES CAVUS: ICD-10-CM

## 2019-07-18 DIAGNOSIS — M84.375A STRESS FRACTURE OF METATARSAL BONE OF LEFT FOOT, INITIAL ENCOUNTER: ICD-10-CM

## 2019-07-18 DIAGNOSIS — G90.523 COMPLEX REGIONAL PAIN SYNDROME TYPE 1 OF BOTH LOWER EXTREMITIES: ICD-10-CM

## 2019-07-18 PROCEDURE — 73630 X-RAY EXAM OF FOOT: CPT | Mod: TC

## 2019-07-18 PROCEDURE — 20600 DRAIN/INJ JOINT/BURSA W/O US: CPT | Performed by: PODIATRIST

## 2019-07-18 PROCEDURE — 99213 OFFICE O/P EST LOW 20 MIN: CPT | Mod: 25 | Performed by: PODIATRIST

## 2019-07-18 RX ORDER — DEXAMETHASONE SODIUM PHOSPHATE 4 MG/ML
4 INJECTION, SOLUTION INTRA-ARTICULAR; INTRALESIONAL; INTRAMUSCULAR; INTRAVENOUS; SOFT TISSUE ONCE
Status: COMPLETED | OUTPATIENT
Start: 2019-07-18 | End: 2019-07-18

## 2019-07-18 RX ADMIN — DEXAMETHASONE SODIUM PHOSPHATE 4 MG: 4 INJECTION, SOLUTION INTRA-ARTICULAR; INTRALESIONAL; INTRAMUSCULAR; INTRAVENOUS; SOFT TISSUE at 13:31

## 2019-07-18 ASSESSMENT — PAIN SCALES - GENERAL: PAINLEVEL: MODERATE PAIN (4)

## 2019-07-18 NOTE — NURSING NOTE
"Chief Complaint   Patient presents with     Musculoskeletal Problem     foot pain       Initial /89 (BP Location: Left arm, Patient Position: Sitting, Cuff Size: Adult Regular)   Pulse 82   Temp 98.1  F (36.7  C) (Tympanic)   Resp 16  Estimated body mass index is 26.54 kg/m  as calculated from the following:    Height as of 6/6/19: 1.778 m (5' 10\").    Weight as of 6/6/19: 83.9 kg (185 lb).  Medication Reconciliation: complete   Patient has elevated blood pressure, previous appointments are showing consistency, nurse talked with patient about health risks associated with elevated BP and patient states he is making an appointment with pt's primary to get his BP under control.   "

## 2019-07-18 NOTE — LETTER
July 18, 2019      Tomasz Newman  [address]  [facility] [address]        To Whom It May Concern:    Tomasz Newman was seen in our clinic. He may return to work on Monday, 07/22/2019,  without restrictions.      Sincerely,        Jyoti Steinberg DPM

## 2019-07-18 NOTE — PROGRESS NOTES
Chief complaint: Patient presents with:  Musculoskeletal Problem: foot pain      History of Present Illness: This 53 year old male is seen for evaluation and suggestions of management of bilateral foot pain. Both feet have greatly improved with pain on the top of the foot, but the pain has now transferred to the plantar RIGHT foot. Pain is minimal on the LEFT foot. Pain on the RIGHT is the plantar [id number] mid arch and it feels like his inserts are too prominent int hat area. He did have them recently adjusted and they are more comfortable now that the metatarsal pad has been removed, but he still has some pain. He anticipates returning to work this upcoming Monday. Patient has also completed his physical therapy. No further pedal complaints today.       History of foot pain:   Patient says he had a fracture in his RIGHT 2nd metatarsal that developed last June, 2018. He works for [employer] and he walks a lot on his feet. He was walking at work and the pain in in his foot started one day and got worse throughout the day. He went to Urgent Care on 06/06/2018 and an x-ray showed no fracture, so they diagnosed him with gout. He went to an orthopedic at [facility] where he had an MRI that confirmed a fracture of the 2nd metatarsal. They placed him in a CAM boot, but he couldn't tolerate it because he felt off balance. The post-op shoe was open toed so he was not allowed to wear it at work. He couldn't use crutches at work so he didn't offload it at all. The pain continued and was steady until February/March of 2019 when the pain increased.  He did not recall any trauma to the foot.      /89 (BP Location: Left arm, Patient Position: Sitting, Cuff Size: Adult Regular)   Pulse 82   Temp 98.1  F (36.7  C) (Tympanic)   Resp 16     There is no problem list on file for this patient.      Past Surgical History:   Procedure Laterality Date     HERNIA REPAIR  2002       Current Outpatient Medications    Medication     acetaminophen-codeine (TYLENOL #3) 300-30 MG per tablet     aspirin 81 MG EC tablet     Cyclobenzaprine HCl (FLEXERIL PO)     ibuprofen (ADVIL/MOTRIN) 800 MG tablet     NAPROXEN SODIUM PO     order for DME     Current Facility-Administered Medications   Medication     dexamethasone (DECADRON) injection 4 mg          Allergies   Allergen Reactions     Percocet [Oxycodone-Acetaminophen]      Facial Swelling       Family History   Problem Relation Age of Onset     Cancer - colorectal Father      Diabetes Brother      Heart Disease Mother         heart disease       Social History     Socioeconomic History     Marital status: Single     Spouse name: Not on file     Number of children: Not on file     Years of education: Not on file     Highest education level: Not on file   Occupational History     Not on file   Social Needs     Financial resource strain: Not on file     Food insecurity:     Worry: Not on file     Inability: Not on file     Transportation needs:     Medical: Not on file     Non-medical: Not on file   Tobacco Use     Smoking status: Never Smoker     Smokeless tobacco: Never Used     Tobacco comment: no passive exposure   Substance and Sexual Activity     Alcohol use: Yes     Comment: wine 3 glasses occasionally     Drug use: Not on file     Sexual activity: Not on file   Lifestyle     Physical activity:     Days per week: Not on file     Minutes per session: Not on file     Stress: Not on file   Relationships     Social connections:     Talks on phone: Not on file     Gets together: Not on file     Attends [employer] service: Not on file     Active member of club or organization: Not on file     Attends meetings of clubs or organizations: Not on file     Relationship status: Not on file     Intimate partner violence:     Fear of current or ex partner: Not on file     Emotionally abused: Not on file     Physically abused: Not on file     Forced sexual activity: Not on file   Other Topics  Concern      Service Not Asked     Blood Transfusions Not Asked     Caffeine Concern Yes     Comment: soda 2 cups daily     Occupational Exposure Not Asked     Hobby Hazards Not Asked     Sleep Concern Not Asked     Stress Concern Not Asked     Weight Concern Not Asked     Special Diet Not Asked     Back Care Not Asked     Exercise Not Asked     Bike Helmet Not Asked     Seat Belt Not Asked     Self-Exams Not Asked     Parent/sibling w/ CABG, MI or angioplasty before 65F 55M? Not Asked   Social History Narrative     Not on file       ROS: 10 point ROS neg other than the symptoms noted above in the HPI.  EXAM  Constitutional: healthy, alert and no distress     Psychiatric: mentation appears normal and affect normal/bright     VASCULAR:  -Dorsalis pedis pulse +2/4 b/l  -Posterior tibial pulse +2/4 b/l  -Capillary refill time < 3 seconds to b/l hallux  -Hair growth Present to b/l anterior legs and ankles    NEURO:  -Light touch sensation intact to b/l plantar forefoot    DERM:  -Skin temperature, texture and turgor WNL b/l    MSK:  -Moderate pain on palpation to plantar RIGHT foot approximately 1cm distal to the most distal aspect of the anterior calcaneus along the soft tissue of hte plantar fascia (no pain evident within the deep, underlying bone)  -No further pain to the mid shaft of RIGHT 2nd and LEFT 4th metatarsal (significant improvement)  ---No further pain on palpation to RIGHT 3rd metatarsal mid shaft  -High arches bilaterally while patient is NWB  -Muscle strength of ankles +5/5 for dorsiflexion, plantarflexion, ABDUction and ADDuction b/l     RIGHT FOOT RADIOGRAPH 04/02/2019  IMPRESSION: Healing/healed fracture of the distal shaft of the second metatarsal. No acute abnormality.  DION VEGA MD  LEFT FOOT RADIOGRAPH 04/02/2019  FINDINGS: There is lucency through the medial sesamoid, probably a normal variant. No definite fracture is seen and there is no dislocation. In particular, no fracture  is seen in the fourth metatarsal.  There is mild degenerative change in the first metatarsal phalangeal joint and there is mild vascular calcification.                                                                 IMPRESSION: No acute fracture.  DION VEGA MD    RIGHT FOOT RADIOGRAPH 05/22/2019                                                                    IMPRESSION: Healed second metatarsal fracture.       MEKHI CARVAJAL MD    LEFT FOOT RADIOGRAPH 05/22/2019                                                                    IMPRESSION: No change from April 2, 2019        MEKHI CARVAJAL MD  ============================================================     ASSESSMENT:  (G90.523) Complex regional pain syndrome type 1 of both lower extremities  (primary encounter diagnosis)    (M84.375A) Stress fracture of metatarsal bone of left foot, initial encounter    (M84.374A) Stress fracture of metatarsal bone of right foot, initial encounter     (M79.671) Right foot pain     (M79.672) Left foot pain     (M21.6X1,  M21.6X2) Acquired bilateral pes cavus      PLAN:  -Patient evaluated and examined. Treatment options discussed with no educational barriers noted.  ---Offered an injection -- patient understands this will not be a guarantee for pain relief, but it may help the soft tissue pain on the plantar surface of the foot. He is in agreement.  -Injection: Obtained written and verbal consent from patient for a steroid injection into the mid plantar fascia of the midfoot of the Right  foot. Reviewed risks and benefits of the injection. Informed patient that the injection may decrease pain long-term, short-term, or not at all. Patient in agreement with an injection today in an effort to slow or reverse the chronic inflammatory process and pain in the foot. Injected a total of 2 mL of 1:1 of 4mg Dexamethasone and 2% Lidocaine plain. Patient tolerated the injection well.  ---This will not guarantee complete pain  relief.  -Continue CMOs  ---Patient has an appointment scheduled for further offloading of the plantar pain  -Work note released for patient to return to work without restrictions on Monday, 07/22/2019.  ---Patient is still concerned about his pain since he is unable to walk more than an hour a day. From a podiatry standpoint, he is healed, so no further work notes will be dispensed for time off work for his feet. However, if he is still experiencing significant pain upon returning to work, he is encouraged to call and an appointment will be scheduled with his PCP and/or a referral to the pain clinic in [place] given his h/o CRPS. He is in agreement with this plan.  -Patient in agreement with the above treatment plan and all of patient's questions were answered.        RTC as needed      Jyoti Steinberg DPM

## 2019-07-18 NOTE — LETTER
July 18, 2019      Tomasz Newman  [address]  [place] [place] [address]        To Whom It May Concern:        Tomasz Newman was seen in our clinic. He may return to work without restrictions.        Sincerely,        Jyoti Steinberg DPM

## 2019-07-19 ENCOUNTER — APPOINTMENT (OUTPATIENT)
Dept: OCCUPATIONAL MEDICINE | Facility: OTHER | Age: 54
End: 2019-07-19

## 2019-07-19 PROCEDURE — 99000 SPECIMEN HANDLING OFFICE-LAB: CPT

## 2019-10-09 ENCOUNTER — HOSPITAL ENCOUNTER (EMERGENCY)
Facility: HOSPITAL | Age: 54
Discharge: HOME | End: 2019-10-09
Attending: INTERNAL MEDICINE | Admitting: INTERNAL MEDICINE
Payer: COMMERCIAL

## 2019-10-09 ENCOUNTER — APPOINTMENT (OUTPATIENT)
Dept: GENERAL RADIOLOGY | Facility: HOSPITAL | Age: 54
End: 2019-10-09
Attending: INTERNAL MEDICINE
Payer: COMMERCIAL

## 2019-10-09 VITALS
HEART RATE: 92 BPM | OXYGEN SATURATION: 98 % | DIASTOLIC BLOOD PRESSURE: 88 MMHG | RESPIRATION RATE: 16 BRPM | SYSTOLIC BLOOD PRESSURE: 152 MMHG | TEMPERATURE: 98.2 F

## 2019-10-09 DIAGNOSIS — S16.1XXA STRAIN OF NECK MUSCLE, INITIAL ENCOUNTER: ICD-10-CM
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
## 2019-10-09 DIAGNOSIS — S39.012A BACK STRAIN, INITIAL ENCOUNTER: ICD-10-CM

## 2019-10-09 PROCEDURE — 99284 EMERGENCY DEPT VISIT MOD MDM: CPT | Mod: Z6 | Performed by: INTERNAL MEDICINE

## 2019-10-09 PROCEDURE — 72100 X-RAY EXAM L-S SPINE 2/3 VWS: CPT | Mod: TC

## 2019-10-09 PROCEDURE — 99283 EMERGENCY DEPT VISIT LOW MDM: CPT

## 2019-10-09 PROCEDURE — 25000132 ZZH RX MED GY IP 250 OP 250 PS 637: Performed by: INTERNAL MEDICINE

## 2019-10-09 PROCEDURE — 72050 X-RAY EXAM NECK SPINE 4/5VWS: CPT | Mod: TC

## 2019-10-09 RX ORDER — IBUPROFEN 800 MG/1
800 TABLET, FILM COATED ORAL ONCE
Status: COMPLETED | OUTPATIENT
Start: 2019-10-09 | End: 2019-10-09

## 2019-10-09 RX ORDER — CYCLOBENZAPRINE HCL 10 MG
10 TABLET ORAL 3 TIMES DAILY PRN
Qty: 15 TABLET | Refills: 0 | Status: SHIPPED | OUTPATIENT
Start: 2019-10-09 | End: 2019-10-14

## 2019-10-09 RX ADMIN — IBUPROFEN 800 MG: 800 TABLET ORAL at 07:25

## 2019-10-09 NOTE — ED TRIAGE NOTES
Pt presents with c/o being rear need [place] this morning, Pt c/o neck and back pain, c-collar applied, Trauma eval called, Dr. Recio in to see Pt immediately.

## 2019-10-09 NOTE — ED AVS SNAPSHOT
[facility]  [address] [address]  Phone:  [phone]                                    Tomasz Newman   MRN: [id number]    Department:  [facility]   Date of Visit:  10/9/2019           After Visit Summary Signature Page    I have received my discharge instructions, and my questions have been answered. I have discussed any challenges I see with this plan with the nurse or doctor.    ..........................................................................................................................................  Patient/Patient Representative Signature      ..........................................................................................................................................  Patient Representative Print Name and Relationship to Patient    ..................................................               ................................................  Date                                   Time    ..........................................................................................................................................  Reviewed by Signature/Title    ...................................................              ..............................................  Date                                               Time          22EPIC Rev 08/18

## 2019-10-11 ASSESSMENT — ENCOUNTER SYMPTOMS
DYSURIA: 0
NAUSEA: 0
FREQUENCY: 0
VOMITING: 0
BLOOD IN STOOL: 0
CHEST TIGHTNESS: 0
SLEEP DISTURBANCE: 0
FEVER: 0
COLOR CHANGE: 0
FLANK PAIN: 0
CONFUSION: 0
HEADACHES: 0
DIZZINESS: 0
NUMBNESS: 0
VOICE CHANGE: 0
PALPITATIONS: 0
NECK PAIN: 1
SHORTNESS OF BREATH: 0
CONTUSION: 0
WHEEZING: 0
ABDOMINAL PAIN: 0
CHILLS: 0
ANAL BLEEDING: 0
ABDOMINAL DISTENTION: 0
LIGHT-HEADEDNESS: 0
MYALGIAS: 0
BACK PAIN: 1
DIAPHORESIS: 0
COUGH: 0
WEAKNESS: 0
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
## 2019-12-20 ENCOUNTER — TRANSFERRED RECORDS (OUTPATIENT)
Dept: HEALTH INFORMATION MANAGEMENT | Facility: CLINIC | Age: 54
End: 2019-12-20

## 2020-01-03 ENCOUNTER — TRANSFERRED RECORDS (OUTPATIENT)
Dept: HEALTH INFORMATION MANAGEMENT | Facility: CLINIC | Age: 55
End: 2020-01-03

## 2020-01-20 ENCOUNTER — TELEPHONE (OUTPATIENT)
Dept: INTERVENTIONAL RADIOLOGY/VASCULAR | Facility: HOSPITAL | Age: 55
End: 2020-01-20

## 2020-01-20 NOTE — TELEPHONE ENCOUNTER
LM in regards to upcoming injection, let patient know not to have a flu shot or immunizations 2 weeks before and 2 weeks after injection

## 2020-01-22 ENCOUNTER — HOSPITAL ENCOUNTER (OUTPATIENT)
Dept: GENERAL RADIOLOGY | Facility: HOSPITAL | Age: 55
Discharge: HOME | End: 2020-01-22
Attending: FAMILY MEDICINE | Admitting: FAMILY MEDICINE
Payer: COMMERCIAL

## 2020-01-22 DIAGNOSIS — M54.50 LOW BACK PAIN: ICD-10-CM

## 2020-01-22 PROCEDURE — 25000128 H RX IP 250 OP 636: Performed by: RADIOLOGY

## 2020-01-22 PROCEDURE — 62323 NJX INTERLAMINAR LMBR/SAC: CPT | Mod: TC

## 2020-01-22 PROCEDURE — C1751 CATH, INF, PER/CENT/MIDLINE: HCPCS | Mod: TC

## 2020-01-22 RX ORDER — IOPAMIDOL 612 MG/ML
15 INJECTION, SOLUTION INTRATHECAL ONCE
Status: COMPLETED | OUTPATIENT
Start: 2020-01-22 | End: 2020-01-22

## 2020-01-22 RX ORDER — METHYLPREDNISOLONE ACETATE 80 MG/ML
80 INJECTION, SUSPENSION INTRA-ARTICULAR; INTRALESIONAL; INTRAMUSCULAR; SOFT TISSUE ONCE
Status: COMPLETED | OUTPATIENT
Start: 2020-01-22 | End: 2020-01-22

## 2020-01-22 RX ORDER — METHYLPREDNISOLONE ACETATE 80 MG/ML
INJECTION, SUSPENSION INTRA-ARTICULAR; INTRALESIONAL; INTRAMUSCULAR; SOFT TISSUE
Status: DISCONTINUED
Start: 2020-01-22 | End: 2020-01-23 | Stop reason: HOSPADM

## 2020-01-22 RX ADMIN — IOPAMIDOL 3 ML: 612 INJECTION, SOLUTION INTRATHECAL at 14:03

## 2020-01-22 RX ADMIN — METHYLPREDNISOLONE ACETATE 80 MG: 80 INJECTION, SUSPENSION INTRA-ARTICULAR; INTRALESIONAL; INTRAMUSCULAR; SOFT TISSUE at 14:04

## 2020-01-22 NOTE — IP AVS SNAPSHOT
[address] [address]  Phone:  [phone]                                    After Visit Summary   1/22/2020    Tomasz Newman    MRN: [id number]           After Visit Summary Signature Page    I have received my discharge instructions, and my questions have been answered. I have discussed any challenges I see with this plan with the nurse or doctor.    ..........................................................................................................................................  Patient/Patient Representative Signature      ..........................................................................................................................................  Patient Representative Print Name and Relationship to Patient    ..................................................               ................................................  Date                                   Time    ..........................................................................................................................................  Reviewed by Signature/Title    ...................................................              ..............................................  Date                                               Time          22EPIC Rev 08/18

## 2020-01-22 NOTE — DISCHARGE INSTRUCTIONS
Home number on file [phone] (home)  Is it ok to leave a message at this number(s)? Yes    Dr Hennessy completed your procedure on 1/22/2020.    Current Pain Level (0-10 Scale): 8/10  Post Pain Level (0-10):  5/10    Radiology Discharge instructions for Steroid Injection    Activity Level:     Do not do any heavy activity or exercise for 24 hours.   Do not drive for 4 hours after your injection.  Diet:   Return to your normal diet.  Medications:   If you have stopped taking your Aspirin, Coumadin/Warfarin, Ibuprofen, or any   other blood thinner for this procedure you may resume in the morning unless   your primary care provider has given you other instructions.    Diabetics may see an increase in blood sugar after steroid injections. If you are concerned about your blood sugar, please contact your family doctor.    Site Care:  Remove the bandage and bathe or shower the morning after the procedure.      Please allow two weeks to experience improvement in your pain.  If you have any further issues, please contact your provider.    Call your Primary Care Provider if you have the following (if your primary care provider is not available please seek emergency care):   Nausea with vomiting   Severe headache   Drowsiness or confusion   Redness or drainage at the injection or puncture site   Temperature over 101 degrees F   Other concerns   Worsening back pain   Stiff neck

## 2020-02-05 ENCOUNTER — TELEPHONE (OUTPATIENT)
Dept: INTERVENTIONAL RADIOLOGY/VASCULAR | Facility: HOSPITAL | Age: 55
End: 2020-02-05

## 2020-02-05 NOTE — TELEPHONE ENCOUNTER
INJECTION POST CALL    Procedure: Epidural TL L 4-5  Radiologist(s): Dr. Venkat Hennessy  Date of Procedure:  01/22/2020        The patient was not available by telephone.    Patient will contact the provider if there are any issues.      VASILIY ROSARIO RN

## 2020-02-06 ENCOUNTER — TELEPHONE (OUTPATIENT)
Dept: INTERVENTIONAL RADIOLOGY/VASCULAR | Facility: HOSPITAL | Age: 55
End: 2020-02-06

## 2020-02-06 NOTE — TELEPHONE ENCOUNTER
INJECTION POST CALL    Procedure: Epidural, HANNAH TL L4-5  Radiologist(s): Dr. Venkat Hennessy  Date of Procedure:  01/22/2020    The patient was not available by telephone.          Lindsey Garcia RN

## 2020-02-07 ENCOUNTER — TELEPHONE (OUTPATIENT)
Dept: INTERVENTIONAL RADIOLOGY/VASCULAR | Facility: HOSPITAL | Age: 55
End: 2020-02-07

## 2020-02-07 NOTE — TELEPHONE ENCOUNTER
INJECTION POST CALL    Procedure: Epidural TL L 4-5  Radiologist(s): Dr. Venkat Hennessy  Date of Procedure:  1/22/2020    Pre-procedure pain score was: 8 (See pre-procedure score)  Post-procedure pain score as of today is: 8  Percentage of pain improvement today?  0%  Where is the pain located? Lower back   Is this new pain? No  Would you like to be scheduled for an additional injection?  Yes      The patient had no questions or concerns.    Patient will contact the provider if there are any issues.      VASILIY ROSARIO RN

## 2020-02-17 ENCOUNTER — TELEPHONE (OUTPATIENT)
Dept: INTERVENTIONAL RADIOLOGY/VASCULAR | Facility: HOSPITAL | Age: 55
End: 2020-02-17

## 2020-02-19 ENCOUNTER — TRANSFERRED RECORDS (OUTPATIENT)
Dept: HEALTH INFORMATION MANAGEMENT | Facility: CLINIC | Age: 55
End: 2020-02-19

## 2020-02-28 ENCOUNTER — HOSPITAL ENCOUNTER (OUTPATIENT)
Dept: INTERVENTIONAL RADIOLOGY/VASCULAR | Facility: HOSPITAL | Age: 55
Discharge: HOME | End: 2020-02-28
Attending: FAMILY MEDICINE | Admitting: FAMILY MEDICINE
Payer: COMMERCIAL

## 2020-02-28 ENCOUNTER — HOSPITAL ENCOUNTER (OUTPATIENT)
Facility: HOSPITAL | Age: 55
Discharge: HOME | End: 2020-02-28
Attending: RADIOLOGY | Admitting: RADIOLOGY
Payer: COMMERCIAL

## 2020-02-28 DIAGNOSIS — M54.50 LOW BACK PAIN: ICD-10-CM

## 2020-02-28 PROCEDURE — 25000128 H RX IP 250 OP 636: Performed by: RADIOLOGY

## 2020-02-28 PROCEDURE — 62323 NJX INTERLAMINAR LMBR/SAC: CPT | Mod: TC

## 2020-02-28 RX ORDER — IOPAMIDOL 612 MG/ML
15 INJECTION, SOLUTION INTRATHECAL ONCE
Status: COMPLETED | OUTPATIENT
Start: 2020-02-28 | End: 2020-02-28

## 2020-02-28 RX ORDER — METHYLPREDNISOLONE ACETATE 80 MG/ML
INJECTION, SUSPENSION INTRA-ARTICULAR; INTRALESIONAL; INTRAMUSCULAR; SOFT TISSUE
Status: DISPENSED
Start: 2020-02-28 | End: 2020-02-28

## 2020-02-28 RX ORDER — METHYLPREDNISOLONE ACETATE 80 MG/ML
80 INJECTION, SUSPENSION INTRA-ARTICULAR; INTRALESIONAL; INTRAMUSCULAR; SOFT TISSUE ONCE
Status: COMPLETED | OUTPATIENT
Start: 2020-02-28 | End: 2020-02-28

## 2020-02-28 RX ADMIN — METHYLPREDNISOLONE ACETATE 80 MG: 80 INJECTION, SUSPENSION INTRA-ARTICULAR; INTRALESIONAL; INTRAMUSCULAR; SOFT TISSUE at 11:17

## 2020-02-28 RX ADMIN — IOPAMIDOL 6 ML: 612 INJECTION, SOLUTION INTRATHECAL at 11:17

## 2020-02-28 NOTE — DISCHARGE INSTRUCTIONS
Home number on file [phone] (home)  Is it ok to leave a message at this number(s)? Yes    Dr. Staley completed your procedure on 2/28/2020.    Current Pain Level (0-10 Scale): 8/10  Post Pain Level (0-10):  2/10    Radiology Discharge instructions for Steroid Injection    Activity Level:     Do not do any heavy activity or exercise for 24 hours.   Do not drive for 4 hours after your injection.  Diet:   Return to your normal diet.  Medications:   If you have stopped taking your Aspirin, Coumadin/Warfarin, Ibuprofen, or any   other blood thinner for this procedure you may resume in the morning unless   your primary care provider has given you other instructions.    Diabetics may see an increase in blood sugar after steroid injections. If you are concerned about your blood sugar, please contact your family doctor.    Site Care:  Remove the bandage and bathe or shower the morning after the procedure.      Please allow two weeks to experience improvement in your pain.  If you have any further issues, please contact your provider.    Call your Primary Care Provider if you have the following (if your primary care provider is not available please seek emergency care):   Nausea with vomiting   Severe headache   Drowsiness or confusion   Redness or drainage at the injection or puncture site   Temperature over 101 degrees F   Other concerns   Worsening back pain   Stiff neck

## 2020-02-28 NOTE — IP AVS SNAPSHOT
[facility]  [address] [address]  Phone:  [phone]  Fax:  [phone]                                    After Visit Summary   2/28/2020    Tomasz Newman    MRN: [id number]           After Visit Summary Signature Page    I have received my discharge instructions, and my questions have been answered. I have discussed any challenges I see with this plan with the nurse or doctor.    ..........................................................................................................................................  Patient/Patient Representative Signature      ..........................................................................................................................................  Patient Representative Print Name and Relationship to Patient    ..................................................               ................................................  Date                                   Time    ..........................................................................................................................................  Reviewed by Signature/Title    ...................................................              ..............................................  Date                                               Time          22EPIC Rev 08/18

## 2020-03-13 ENCOUNTER — TELEPHONE (OUTPATIENT)
Dept: INTERVENTIONAL RADIOLOGY/VASCULAR | Facility: HOSPITAL | Age: 55
End: 2020-03-13

## 2020-03-13 NOTE — TELEPHONE ENCOUNTER
INJECTION POST CALL    Procedure: Epidural TL L4-5  Radiologist(s): Dr. Marcello Staley  Date of Procedure:  2-28-20    Pre-procedure pain score was: 8 (See pre-procedure score)  Post-procedure pain score as of today is: 8  Percentage of pain improvement today?  0%  Do you feel this injection was beneficial? no  Where is the pain located? Pain is located in the low back middle of the spine. Patient describes that pain to be sharp like a knife. Pain occasionally goes down the right leg if sitting or standing for longer than 30 minutes.  Is this new pain? No  Would you like to be scheduled for an additional injection?  Yes   Patient states he had about 2 days of relief from this injection along with side affects he believes could be from the steroid. Side affects had: chills, headache, muscle cramps. PT saw  and he too believes those were side affects from steroid. The first injection in January gave no relief.         I responded to the patient's questions/concerns.    Patient will contact the provider if there are any issues.      Jyoti Triana

## 2020-03-13 NOTE — TELEPHONE ENCOUNTER
INJECTION POST CALL    Procedure: Epidural TL L4-5  Radiologist(s): Dr. Marcello Staley  Date of Procedure:  2-28-20      The patient was not available by telephone. Left message for patient to call [phone].        Jyoti Triana

## 2020-07-24 NOTE — PROGRESS NOTES
Outpatient Physical Therapy Progress Note     Patient: Tomasz Newman  : 1965    Beginning/End Dates of Reporting Period:  19 to 7/15/2019    Referring Provider: Dr. Steinberg     Therapy Diagnosis:   Stress fracture of metatarsal bone of right foot, initial encounter [M84.374A]       Stress fracture of metatarsal bone of left foot, initial encounter [M84.375A]       Right foot pain [M79.671]       Left foot pain [M79.672]       Acquired bilateral pes cavus [M21.6X1, M21.6X2]              Client Self Report: Symptoms improving. Following up with Dr. Steinberg and then returning to work is planned.     Assessment: Symptoms improved but not fully resolved.Following exercises pain at plantarfascia had increased. Requested patient continue with HEP, but stop if pain becomes too severe or lasts greater than 2 hours after exercises. Provided graded range for exercises with lower frequency intitally and pending good response increasing frequency. Requested patient bring exercises handouts to Dr. Steinberg's office to show her what he is currently working on. Patient has one additional sessions scheduled, but states if he returns to work he will have difficulty attending. May need to follow up with patient over the phone to monitor response.    Goals:  Short-term goals:  To be achieved in 4 weeks:     1.) Patient will report 50% improvement of overall symptoms to allow safe return to PLOF  2.) Patient will increase active DF to full functional motion allowing performance of stairs and gait with normal gait pattern.      Long-term goals:  To be achieved in 8  weeks:  1.) Patient will reduce pain level to 1-2/10 to allow increased ability to stand and perform daily activities with less pain  2.) Patient will report 80% improvement of overall symptoms and pain in ankle to allow increased ability to perform daily activities with reduced pain and irritation.  3.) Patient will be able to tolerate work related tasks such  as prolonged standing and walking without limitation from pain or edema in bilateral feet.   4). Patient will be able to return to desired recreational activities that involve ambulation over uneven surfaces with good tolerance.     Plan:  Discharge from therapy.    Discharge:    Reason for Discharge: Patient has failed to schedule further appointments.    Equipment Issued: HEP    Discharge Plan: Patient to continue home program.

## 2020-10-09 ENCOUNTER — TRANSFERRED RECORDS (OUTPATIENT)
Dept: HEALTH INFORMATION MANAGEMENT | Facility: CLINIC | Age: 55
End: 2020-10-09

## 2020-10-27 ENCOUNTER — MEDICAL CORRESPONDENCE (OUTPATIENT)
Dept: ULTRASOUND IMAGING | Facility: HOSPITAL | Age: 55
End: 2020-10-27

## 2020-10-30 ENCOUNTER — MEDICAL CORRESPONDENCE (OUTPATIENT)
Dept: ULTRASOUND IMAGING | Facility: HOSPITAL | Age: 55
End: 2020-10-30

## 2020-11-06 ENCOUNTER — HOSPITAL ENCOUNTER (OUTPATIENT)
Dept: ULTRASOUND IMAGING | Facility: HOSPITAL | Age: 55
Discharge: HOME | End: 2020-11-06
Attending: FAMILY MEDICINE | Admitting: FAMILY MEDICINE
Payer: COMMERCIAL

## 2020-11-06 DIAGNOSIS — R10.10 PAIN OF UPPER ABDOMEN: ICD-10-CM

## 2020-11-06 PROCEDURE — 76705 ECHO EXAM OF ABDOMEN: CPT

## 2020-11-16 ENCOUNTER — MEDICAL CORRESPONDENCE (OUTPATIENT)
Dept: HEALTH INFORMATION MANAGEMENT | Facility: CLINIC | Age: 55
End: 2020-11-16

## 2020-12-07 ENCOUNTER — TELEPHONE (OUTPATIENT)
Dept: SURGERY | Facility: OTHER | Age: 55
End: 2020-12-07

## 2020-12-07 NOTE — TELEPHONE ENCOUNTER
Patient has been called three times on the referral from Dr. Ramirez for pain in upper abdomen. Arnulfo has not called back to schedule.

## 2020-12-14 ENCOUNTER — HEALTH MAINTENANCE LETTER (OUTPATIENT)
Age: 55
End: 2020-12-14

## 2021-02-19 ENCOUNTER — HOSPITAL ENCOUNTER (EMERGENCY)
Facility: HOSPITAL | Age: 56
Discharge: HOME | End: 2021-02-19
Attending: NURSE PRACTITIONER | Admitting: NURSE PRACTITIONER
Payer: OTHER MISCELLANEOUS

## 2021-02-19 ENCOUNTER — APPOINTMENT (OUTPATIENT)
Dept: GENERAL RADIOLOGY | Facility: HOSPITAL | Age: 56
End: 2021-02-19
Attending: NURSE PRACTITIONER
Payer: OTHER MISCELLANEOUS

## 2021-02-19 VITALS
TEMPERATURE: 97.9 F | RESPIRATION RATE: 20 BRPM | OXYGEN SATURATION: 95 % | DIASTOLIC BLOOD PRESSURE: 100 MMHG | HEART RATE: 98 BPM | SYSTOLIC BLOOD PRESSURE: 162 MMHG

## 2021-02-19 DIAGNOSIS — S59.902A INJURY OF LEFT ELBOW, INITIAL ENCOUNTER: Primary | ICD-10-CM
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
## 2021-02-19 PROCEDURE — 99213 OFFICE O/P EST LOW 20 MIN: CPT | Performed by: NURSE PRACTITIONER

## 2021-02-19 PROCEDURE — 73070 X-RAY EXAM OF ELBOW: CPT | Mod: LT

## 2021-02-19 PROCEDURE — G0463 HOSPITAL OUTPT CLINIC VISIT: HCPCS

## 2021-02-19 ASSESSMENT — ENCOUNTER SYMPTOMS
ARTHRALGIAS: 1
WOUND: 1
MYALGIAS: 1
JOINT SWELLING: 1

## 2021-02-19 NOTE — DISCHARGE INSTRUCTIONS
Use the Ace wrap, apply ice and take Tylenol or ibuprofen as needed for pain.    Follow-up with your doctor if no improvement in symptoms.  Mom  Return to emergency department for worsening or concerning symptoms.

## 2021-02-19 NOTE — ED PROVIDER NOTES
History     Chief Complaint   Patient presents with     Elbow Pain     left sided after hitting on metal pole at work     HPI  Tomasz Newman is a 55 year old male who presents to urgent care for a left elbow injury.  Patient notes that he was lifting some boxes to throw away when he accidentally bumped his left elbow against a pole.  He has pain to posterior left elbow along with an abrasion.  Some decreased range of motion to his left elbow.  This was a work-related injury.  Patient works at [email or web address].     Allergies:  Allergies   Allergen Reactions     Percocet [Oxycodone-Acetaminophen]      Facial Swelling       Problem List:    There are no active problems to display for this patient.       Past Medical History:    History reviewed. No pertinent past medical history.    Past Surgical History:    Past Surgical History:   Procedure Laterality Date     HERNIA REPAIR  2002       Family History:    Family History   Problem Relation Age of Onset     Cancer - colorectal Father      Diabetes Brother      Heart Disease Mother         heart disease       Social History:  Marital Status:  Single [1]  Social History     Tobacco Use     Smoking status: Never Smoker     Smokeless tobacco: Never Used     Tobacco comment: no passive exposure   Substance Use Topics     Alcohol use: Yes     Comment: wine 3 glasses occasionally     Drug use: None        Medications:    acetaminophen-codeine (TYLENOL #3) 300-30 MG per tablet  aspirin 81 MG EC tablet  Cyclobenzaprine HCl (FLEXERIL PO)  ibuprofen (ADVIL/MOTRIN) 800 MG tablet  NAPROXEN SODIUM PO          Review of Systems   Musculoskeletal: Positive for arthralgias, joint swelling and myalgias.   Skin: Positive for wound.   All other systems reviewed and are negative.      Physical Exam   BP: 162/100  Pulse: 98  Temp: 97.9  F (36.6  C)  Resp: 20  SpO2: 95 %      Physical Exam  Vitals signs and nursing note reviewed.   Constitutional:       Appearance: Normal appearance. He is  not toxic-appearing.   HENT:      Head: Normocephalic.   Eyes:      Pupils: Pupils are equal, round, and reactive to light.   Neck:      Musculoskeletal: Normal range of motion.   Cardiovascular:      Rate and Rhythm: Normal rate.   Pulmonary:      Effort: Pulmonary effort is normal.   Musculoskeletal:      Left elbow: He exhibits decreased range of motion (mild) and swelling (mild). He exhibits no effusion, no deformity and no laceration. Tenderness found. No radial head, no medial epicondyle, no lateral epicondyle and no olecranon process tenderness noted.        Arms:    Skin:     General: Skin is warm and dry.      Capillary Refill: Capillary refill takes less than 2 seconds.      Findings: No bruising or erythema.   Neurological:      Mental Status: He is alert and oriented to person, place, and time.         ED Course        Procedures                 Results for orders placed or performed during the hospital encounter of 02/19/21 (from the past 24 hour(s))   Elbow XR,  2 views, left    Narrative    PROCEDURE:  XR ELBOW LT 2 VW    HISTORY: pt hit left elbow on pole, unable to straighten arm  completely    COMPARISON:  None.    TECHNIQUE:  2 views of the left elbow were obtained.    FINDINGS:  No fracture or dislocation is identified. The joint spaces  are preserved.        Impression    IMPRESSION: Normal left elbow      MEKHI CARVAJAL MD       Medications - No data to display    Assessments & Plan (with Medical Decision Making)     I have reviewed the nursing notes.    I have reviewed the findings, diagnosis, plan and need for follow up with the patient.  55-year-old male that was moving some boxes when he bumped against a pole at work resulting in a left elbow injury.  Mildly decreased range of motion to left elbow.  Swelling and tenderness to palpation near left elbow.  Abrasion observed to left elbow.  Able to move fingers with minimal difficulty.  Left radial pulse 2+.  Cap refill less than 2  seconds.  No acute findings on left elbow x-ray.  Discussed findings with patient.  Ace wrap applied to left elbow.  Recommended applying ice, Tylenol or ibuprofen as needed for pain.  Lifting restrictions given of no greater than 5 pounds.  Discussed with patient to follow-up with PCP for reevaluation as needed if no improvement in symptoms.  Return to ED/UC for worsening or concerning symptoms.  Patient verbalized understanding.    This document was prepared using a combination of typing and voice generated software.  While every attempt was made for accuracy, spelling and grammatical errors may exist.    Discharge Medication List as of 2/19/2021 11:16 AM          Final diagnoses:   Injury of left elbow, initial encounter   Work related injury       2/19/2021   [facility]     Mpofu, Tete, CNP  02/19/21 [id number]

## 2021-02-19 NOTE — Clinical Note
Tomasz Newman was seen and treated in our emergency department on 2/19/2021.  He may return to work on 02/19/2021.  Patient should avoid lifting more than 5 pounds for the rest of the day today.     If you have any questions or concerns, please don't hesitate to call.      Mpofu, Prudence, CNP

## 2021-10-03 ENCOUNTER — HEALTH MAINTENANCE LETTER (OUTPATIENT)
Age: 56
End: 2021-10-03

## 2021-12-14 ENCOUNTER — TRANSFERRED RECORDS (OUTPATIENT)
Dept: HEALTH INFORMATION MANAGEMENT | Facility: CLINIC | Age: 56
End: 2021-12-14

## 2021-12-14 ENCOUNTER — LAB REQUISITION (OUTPATIENT)
Dept: LAB | Facility: HOSPITAL | Age: 56
End: 2021-12-14
Payer: COMMERCIAL

## 2021-12-14 ENCOUNTER — HOSPITAL ENCOUNTER (INPATIENT)
Facility: HOSPITAL | Age: 56
LOS: 13 days | Discharge: HOME | End: 2021-12-27
Attending: STUDENT IN AN ORGANIZED HEALTH CARE EDUCATION/TRAINING PROGRAM | Admitting: HOSPITALIST
Payer: COMMERCIAL

## 2021-12-14 ENCOUNTER — APPOINTMENT (OUTPATIENT)
Dept: CT IMAGING | Facility: HOSPITAL | Age: 56
End: 2021-12-14
Attending: STUDENT IN AN ORGANIZED HEALTH CARE EDUCATION/TRAINING PROGRAM
Payer: COMMERCIAL

## 2021-12-14 DIAGNOSIS — U07.1 PNEUMONIA DUE TO 2019 NOVEL CORONAVIRUS: ICD-10-CM

## 2021-12-14 DIAGNOSIS — R06.00 DYSPNEA, UNSPECIFIED: ICD-10-CM

## 2021-12-14 DIAGNOSIS — J96.01 ACUTE RESPIRATORY FAILURE WITH HYPOXIA (H): Primary | ICD-10-CM

## 2021-12-14 DIAGNOSIS — J12.82 PNEUMONIA DUE TO 2019 NOVEL CORONAVIRUS: ICD-10-CM

## 2021-12-14 LAB
ALBUMIN SERPL-MCNC: 2.2 G/DL (ref 3.4–5)
ALP SERPL-CCNC: 84 U/L (ref 40–150)
ALT SERPL W P-5'-P-CCNC: 119 U/L (ref 0–70)
ANION GAP SERPL CALCULATED.3IONS-SCNC: 6 MMOL/L (ref 3–14)
AST SERPL W P-5'-P-CCNC: 87 U/L (ref 0–45)
BASOPHILS # BLD AUTO: 0 10E3/UL (ref 0–0.2)
BASOPHILS NFR BLD AUTO: 0 %
BILIRUB SERPL-MCNC: 0.5 MG/DL (ref 0.2–1.3)
BUN SERPL-MCNC: 13 MG/DL (ref 7–30)
CALCIUM SERPL-MCNC: 7.9 MG/DL (ref 8.5–10.1)
CHLORIDE BLD-SCNC: 105 MMOL/L (ref 94–109)
CO2 SERPL-SCNC: 27 MMOL/L (ref 20–32)
CREAT SERPL-MCNC: 0.8 MG/DL (ref 0.66–1.25)
CRP SERPL-MCNC: 67.8 MG/L (ref 0–8)
D DIMER PPP FEU-MCNC: 13.17 UG/ML FEU (ref 0–0.5)
D DIMER PPP FEU-MCNC: 14.14 UG/ML FEU (ref 0–0.5)
EOSINOPHIL # BLD AUTO: 0.1 10E3/UL (ref 0–0.7)
EOSINOPHIL NFR BLD AUTO: 1 %
ERYTHROCYTE [DISTWIDTH] IN BLOOD BY AUTOMATED COUNT: 12.7 % (ref 10–15)
GFR SERPL CREATININE-BSD FRML MDRD: >90 ML/MIN/1.73M2
GLUCOSE BLD-MCNC: 128 MG/DL (ref 70–99)
HCT VFR BLD AUTO: 39.9 % (ref 40–53)
HGB BLD-MCNC: 12.9 G/DL (ref 13.3–17.7)
HOLD SPECIMEN: NORMAL
HOLD SPECIMEN: NORMAL
IMM GRANULOCYTES # BLD: 0.1 10E3/UL
IMM GRANULOCYTES NFR BLD: 2 %
LACTATE SERPL-SCNC: 1.4 MMOL/L (ref 0.7–2)
LACTATE SERPL-SCNC: 1.8 MMOL/L (ref 0.7–2)
LYMPHOCYTES # BLD AUTO: 0.7 10E3/UL (ref 0.8–5.3)
LYMPHOCYTES NFR BLD AUTO: 10 %
MCH RBC QN AUTO: 30.1 PG (ref 26.5–33)
MCHC RBC AUTO-ENTMCNC: 32.3 G/DL (ref 31.5–36.5)
MCV RBC AUTO: 93 FL (ref 78–100)
MONOCYTES # BLD AUTO: 0.5 10E3/UL (ref 0–1.3)
MONOCYTES NFR BLD AUTO: 6 %
NEUTROPHILS # BLD AUTO: 5.7 10E3/UL (ref 1.6–8.3)
NEUTROPHILS NFR BLD AUTO: 81 %
NRBC # BLD AUTO: 0 10E3/UL
NRBC BLD AUTO-RTO: 0 /100
PLATELET # BLD AUTO: 298 10E3/UL (ref 150–450)
POTASSIUM BLD-SCNC: 3.4 MMOL/L (ref 3.4–5.3)
POTASSIUM BLD-SCNC: 4 MMOL/L (ref 3.4–5.3)
PROCALCITONIN SERPL-MCNC: 0.24 NG/ML
PROT SERPL-MCNC: 7.6 G/DL (ref 6.8–8.8)
RBC # BLD AUTO: 4.28 10E6/UL (ref 4.4–5.9)
SODIUM SERPL-SCNC: 138 MMOL/L (ref 133–144)
TROPONIN I SERPL HS-MCNC: 5 NG/L
WBC # BLD AUTO: 7.1 10E3/UL (ref 4–11)

## 2021-12-14 PROCEDURE — 94640 AIRWAY INHALATION TREATMENT: CPT

## 2021-12-14 PROCEDURE — 94640 AIRWAY INHALATION TREATMENT: CPT | Mod: 76

## 2021-12-14 PROCEDURE — 250N000009 HC RX 250: Performed by: STUDENT IN AN ORGANIZED HEALTH CARE EDUCATION/TRAINING PROGRAM

## 2021-12-14 PROCEDURE — 82040 ASSAY OF SERUM ALBUMIN: CPT | Performed by: STUDENT IN AN ORGANIZED HEALTH CARE EDUCATION/TRAINING PROGRAM

## 2021-12-14 PROCEDURE — 85379 FIBRIN DEGRADATION QUANT: CPT | Performed by: STUDENT IN AN ORGANIZED HEALTH CARE EDUCATION/TRAINING PROGRAM

## 2021-12-14 PROCEDURE — 84484 ASSAY OF TROPONIN QUANT: CPT | Performed by: FAMILY MEDICINE

## 2021-12-14 PROCEDURE — 99285 EMERGENCY DEPT VISIT HI MDM: CPT | Performed by: STUDENT IN AN ORGANIZED HEALTH CARE EDUCATION/TRAINING PROGRAM

## 2021-12-14 PROCEDURE — 80053 COMPREHEN METABOLIC PANEL: CPT | Performed by: STUDENT IN AN ORGANIZED HEALTH CARE EDUCATION/TRAINING PROGRAM

## 2021-12-14 PROCEDURE — 250N000011 HC RX IP 250 OP 636: Performed by: STUDENT IN AN ORGANIZED HEALTH CARE EDUCATION/TRAINING PROGRAM

## 2021-12-14 PROCEDURE — 36415 COLL VENOUS BLD VENIPUNCTURE: CPT | Performed by: HOSPITALIST

## 2021-12-14 PROCEDURE — 85379 FIBRIN DEGRADATION QUANT: CPT | Performed by: FAMILY MEDICINE

## 2021-12-14 PROCEDURE — 85025 COMPLETE CBC W/AUTO DIFF WBC: CPT | Performed by: STUDENT IN AN ORGANIZED HEALTH CARE EDUCATION/TRAINING PROGRAM

## 2021-12-14 PROCEDURE — 84132 ASSAY OF SERUM POTASSIUM: CPT | Performed by: HOSPITALIST

## 2021-12-14 PROCEDURE — 999N000157 HC STATISTIC RCP TIME EA 10 MIN

## 2021-12-14 PROCEDURE — 71275 CT ANGIOGRAPHY CHEST: CPT

## 2021-12-14 PROCEDURE — 250N000012 HC RX MED GY IP 250 OP 636 PS 637: Performed by: STUDENT IN AN ORGANIZED HEALTH CARE EDUCATION/TRAINING PROGRAM

## 2021-12-14 PROCEDURE — 83605 ASSAY OF LACTIC ACID: CPT | Performed by: STUDENT IN AN ORGANIZED HEALTH CARE EDUCATION/TRAINING PROGRAM

## 2021-12-14 PROCEDURE — 250N000011 HC RX IP 250 OP 636: Performed by: HOSPITALIST

## 2021-12-14 PROCEDURE — 250N000013 HC RX MED GY IP 250 OP 250 PS 637: Performed by: HOSPITALIST

## 2021-12-14 PROCEDURE — 84145 PROCALCITONIN (PCT): CPT | Performed by: STUDENT IN AN ORGANIZED HEALTH CARE EDUCATION/TRAINING PROGRAM

## 2021-12-14 PROCEDURE — 96365 THER/PROPH/DIAG IV INF INIT: CPT

## 2021-12-14 PROCEDURE — 120N000001 HC R&B MED SURG/OB

## 2021-12-14 PROCEDURE — 36415 COLL VENOUS BLD VENIPUNCTURE: CPT | Performed by: STUDENT IN AN ORGANIZED HEALTH CARE EDUCATION/TRAINING PROGRAM

## 2021-12-14 PROCEDURE — 96375 TX/PRO/DX INJ NEW DRUG ADDON: CPT

## 2021-12-14 PROCEDURE — 83605 ASSAY OF LACTIC ACID: CPT | Performed by: HOSPITALIST

## 2021-12-14 PROCEDURE — 99223 1ST HOSP IP/OBS HIGH 75: CPT | Performed by: HOSPITALIST

## 2021-12-14 PROCEDURE — 86140 C-REACTIVE PROTEIN: CPT | Performed by: STUDENT IN AN ORGANIZED HEALTH CARE EDUCATION/TRAINING PROGRAM

## 2021-12-14 PROCEDURE — 99285 EMERGENCY DEPT VISIT HI MDM: CPT | Mod: 25

## 2021-12-14 PROCEDURE — 258N000003 HC RX IP 258 OP 636: Performed by: STUDENT IN AN ORGANIZED HEALTH CARE EDUCATION/TRAINING PROGRAM

## 2021-12-14 RX ORDER — IPRATROPIUM BROMIDE AND ALBUTEROL SULFATE 2.5; .5 MG/3ML; MG/3ML
3 SOLUTION RESPIRATORY (INHALATION) ONCE
Status: COMPLETED | OUTPATIENT
Start: 2021-12-14 | End: 2021-12-14

## 2021-12-14 RX ORDER — ONDANSETRON 4 MG/1
4 TABLET, ORALLY DISINTEGRATING ORAL EVERY 6 HOURS PRN
Status: DISCONTINUED | OUTPATIENT
Start: 2021-12-14 | End: 2021-12-27 | Stop reason: HOSPADM

## 2021-12-14 RX ORDER — LIDOCAINE 40 MG/G
CREAM TOPICAL
Status: DISCONTINUED | OUTPATIENT
Start: 2021-12-14 | End: 2021-12-14

## 2021-12-14 RX ORDER — AMOXICILLIN 250 MG
1 CAPSULE ORAL 2 TIMES DAILY PRN
Status: DISCONTINUED | OUTPATIENT
Start: 2021-12-14 | End: 2021-12-14

## 2021-12-14 RX ORDER — AMOXICILLIN 250 MG
2 CAPSULE ORAL 2 TIMES DAILY PRN
Status: DISCONTINUED | OUTPATIENT
Start: 2021-12-14 | End: 2021-12-14

## 2021-12-14 RX ORDER — PANTOPRAZOLE SODIUM 40 MG/1
40 TABLET, DELAYED RELEASE ORAL
Status: DISCONTINUED | OUTPATIENT
Start: 2021-12-15 | End: 2021-12-27 | Stop reason: HOSPADM

## 2021-12-14 RX ORDER — FUROSEMIDE 10 MG/ML
40 INJECTION INTRAMUSCULAR; INTRAVENOUS DAILY
Status: DISCONTINUED | OUTPATIENT
Start: 2021-12-15 | End: 2021-12-15

## 2021-12-14 RX ORDER — ALBUTEROL SULFATE 90 UG/1
2 AEROSOL, METERED RESPIRATORY (INHALATION) 4 TIMES DAILY
Status: DISCONTINUED | OUTPATIENT
Start: 2021-12-14 | End: 2021-12-27 | Stop reason: HOSPADM

## 2021-12-14 RX ORDER — AMOXICILLIN 250 MG
1 CAPSULE ORAL 2 TIMES DAILY PRN
Status: DISCONTINUED | OUTPATIENT
Start: 2021-12-14 | End: 2021-12-27 | Stop reason: HOSPADM

## 2021-12-14 RX ORDER — OMEPRAZOLE
20 KIT
Status: DISCONTINUED | OUTPATIENT
Start: 2021-12-15 | End: 2021-12-14 | Stop reason: CLARIF

## 2021-12-14 RX ORDER — ONDANSETRON 2 MG/ML
4 INJECTION INTRAMUSCULAR; INTRAVENOUS EVERY 6 HOURS PRN
Status: DISCONTINUED | OUTPATIENT
Start: 2021-12-14 | End: 2021-12-27 | Stop reason: HOSPADM

## 2021-12-14 RX ORDER — CEFTRIAXONE SODIUM 2 G/50ML
2 INJECTION, SOLUTION INTRAVENOUS EVERY 24 HOURS
Status: DISCONTINUED | OUTPATIENT
Start: 2021-12-15 | End: 2021-12-20

## 2021-12-14 RX ORDER — CEFTRIAXONE SODIUM 1 G/50ML
1 INJECTION, SOLUTION INTRAVENOUS ONCE
Status: COMPLETED | OUTPATIENT
Start: 2021-12-14 | End: 2021-12-14

## 2021-12-14 RX ORDER — IOPAMIDOL 755 MG/ML
60 INJECTION, SOLUTION INTRAVASCULAR ONCE
Status: COMPLETED | OUTPATIENT
Start: 2021-12-14 | End: 2021-12-14

## 2021-12-14 RX ORDER — AMOXICILLIN 250 MG
2 CAPSULE ORAL 2 TIMES DAILY PRN
Status: DISCONTINUED | OUTPATIENT
Start: 2021-12-14 | End: 2021-12-27 | Stop reason: HOSPADM

## 2021-12-14 RX ORDER — LIDOCAINE 40 MG/G
CREAM TOPICAL
Status: DISCONTINUED | OUTPATIENT
Start: 2021-12-14 | End: 2021-12-27 | Stop reason: HOSPADM

## 2021-12-14 RX ADMIN — AZITHROMYCIN MONOHYDRATE 500 MG: 500 INJECTION, POWDER, LYOPHILIZED, FOR SOLUTION INTRAVENOUS at 17:02

## 2021-12-14 RX ADMIN — ALBUTEROL SULFATE 2 PUFF: 90 AEROSOL, METERED RESPIRATORY (INHALATION) at 19:23

## 2021-12-14 RX ADMIN — CEFTRIAXONE SODIUM 1 G: 1 INJECTION, SOLUTION INTRAVENOUS at 16:28

## 2021-12-14 RX ADMIN — SODIUM CHLORIDE 1000 ML: 9 INJECTION, SOLUTION INTRAVENOUS at 16:27

## 2021-12-14 RX ADMIN — IPRATROPIUM BROMIDE AND ALBUTEROL SULFATE 3 ML: .5; 3 SOLUTION RESPIRATORY (INHALATION) at 16:34

## 2021-12-14 RX ADMIN — ENOXAPARIN SODIUM 39 MG: 40 INJECTION SUBCUTANEOUS at 19:14

## 2021-12-14 RX ADMIN — IOPAMIDOL 60 ML: 755 INJECTION, SOLUTION INTRAVENOUS at 16:19

## 2021-12-14 RX ADMIN — DEXAMETHASONE 6 MG: 2 TABLET ORAL at 17:02

## 2021-12-14 RX ADMIN — CEFTRIAXONE SODIUM 1 G: 1 INJECTION, SOLUTION INTRAVENOUS at 19:42

## 2021-12-14 ASSESSMENT — ACTIVITIES OF DAILY LIVING (ADL)
ADLS_ACUITY_SCORE: 5
DRESSING/BATHING_DIFFICULTY: NO
ADLS_ACUITY_SCORE: 5
ADLS_ACUITY_SCORE: 8
DIFFICULTY_COMMUNICATING: NO
DIFFICULTY_EATING/SWALLOWING: NO
ADLS_ACUITY_SCORE: 5
TOILETING_ISSUES: OTHER (SEE COMMENTS)

## 2021-12-14 ASSESSMENT — MIFFLIN-ST. JEOR: SCORE: 1619.25

## 2021-12-14 NOTE — ED TRIAGE NOTES
Pt has covid pneumonia. 11/29/21 was dx for covid  Pt on 2L currently.  O2 sameer 79% on RA   Placed pt on 4L via [place] no fever.  Pt had no complaints  Just went to clinic to get paper to go back to work. Pt is not vaccinated  Pt was continue to sit @ 80% 4L  increased to 6

## 2021-12-14 NOTE — ED NOTES
Patient went to clinic to get paper filled out for FMLA and had low O2 sats. Patient reports clinic diagnosed him with covid pneumonia. Patient had positive test 11/29/21, symptoms started previous day 11/28/21. Patient reports being unvaccinated.

## 2021-12-14 NOTE — H&P
[facility]    History and Physical - Hospitalist Service       Date of Admission:  12/14/2021    Assessment & Plan      Tomasz Newman is a 56 year old male admitted on 12/14/2021. He diagnosed with COVID-19 pneumonia 15 days ago on 11/29/21 at [facility]. Has been feeling the same over last 2 weeks, some shortness of breath and weakness. Unvaccinated. He did not receive any steroids prior.  Patient presented to outpatient clinic with hypoxia was sent to the emergency room.  Patient is required 4 to 6 L of cannula.  He did have elevated D-dimer PE study was negative.  Patient did have procalcitonin elevated being treated for pneumonia on azithromycin and Rocephin and Decadron. CTA done Widespread pulmonary parenchymal opacities consistent with Pneumonia. No pulmonary emboli.    Acute hypoxic respiratory failure 2nd form COVID 19 PNA   Elevated D dimer  Elevated Inflammatory Markers  -Monitor cultures  -Wean off oxygen  -continue Decadron 6 mg daily  -continue Rocephin and azithromycin  -Daily CRP  -Lovenox 0.5mg/kg bid   -Omeprazole 20mg daily  -discussed proning  -No remdisivir at 15 days out   -Lasix  40 mg daily    Transaminitits   Likely 2nd form Covid 19   Monitor    Anemia  Monitor HGB       Diet:   regular  DVT Prophylaxis: Enoxaparin (Lovenox) SQ  Tripp Catheter: Not present  Central Lines: None  Code Status:    Full Code     Clinically Significant Risk Factors Present on Admission              # Platelet Defect: home medication list includes an antiplatelet medication      Disposition Plan   Expected Discharge:   2-3 days pending further work up   Anticipated discharge location:  Awaiting care coordination huddle  Delays:             The patient's care was discussed with the Patient.    Elvira Vera DO  [facility]  Securely message with the Vocera Web Console (learn more here)  Text page via [employer]  Sesaring/Directory        ______________________________________________________________________    Chief Complaint   Cough     History is obtained from the patient    History of Present Illness   Tomasz Newamn is a 56 year old male who tested positive for COVID-19 15 days ago.  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  He was brought to the emergency room and he was on initially 4 L but then bump up to 6 L.  He is D-dimer was elevated, he did have a PE study was negative for pulmonary embolism.  Procalcitonin was also elevated.  Patient was started on Decadron ipratropium azithromycin Rocephin.  Patient will be admitted for further work-up. His is Full Code    Review of Systems    The 10 point Review of Systems is negative other than noted in the HPI or here.      Past Medical History    I have reviewed this patient's medical history and updated it with pertinent information if needed.   No past medical history on file.    Past Surgical History   I have reviewed this patient's surgical history and updated it with pertinent information if needed.  Past Surgical History:   Procedure Laterality Date     HERNIA REPAIR  2002       Social History   I have reviewed this patient's social history and updated it with pertinent information if needed.  Social History     Tobacco Use     Smoking status: Never Smoker     Smokeless tobacco: Never Used     Tobacco comment: no passive exposure   Substance Use Topics     Alcohol use: Yes     Comment: wine 3 glasses occasionally     Drug use: Not on file       Family History   I have reviewed this patient's family history and updated it with pertinent information if needed.  Family History   Problem Relation Age of Onset     Cancer - colorectal Father      Diabetes Brother      Heart Disease Mother         heart disease       Prior to Admission Medications   Prior to Admission Medications   Prescriptions Last  Dose Informant Patient Reported? Taking?   Cyclobenzaprine HCl (FLEXERIL PO) More than a month at Unknown time  Yes Yes   Sig: Take 10 mg by mouth 3 times daily as needed for muscle spasms   NAPROXEN SODIUM PO More than a month at Unknown time  Yes Yes   Sig: Take 550 mg by mouth   acetaminophen-codeine (TYLENOL #3) 300-30 MG per tablet More than a month at Unknown time  Yes Yes   Sig: Take 1 tablet by mouth every 6 hours as needed for severe pain   aspirin 81 MG EC tablet Past Week at Unknown time  Yes Yes   Sig: Take 81 mg by mouth daily   ibuprofen (ADVIL/MOTRIN) 800 MG tablet More than a month at Unknown time  No Yes   Sig: Take 1 tablet (800 mg) by mouth every 8 hours as needed for moderate pain      Facility-Administered Medications: None     Allergies   Allergies   Allergen Reactions     Percocet [Oxycodone-Acetaminophen]      Facial Swelling       Physical Exam   Vital Signs: Temp: 97.7  F (36.5  C) Temp src: Tympanic BP: 144/97 Pulse: 105   Resp: 24 SpO2: 95 % O2 Device: Nasal cannula Oxygen Delivery: 5 LPM  Weight: 170 lbs 0 oz    Physical Exam  Constitutional:       Appearance: He is ill-appearing.   HENT:      Head: Normocephalic.      Nose: Nose normal.      Mouth/Throat:      Pharynx: Oropharynx is clear.   Eyes:      Conjunctiva/sclera: Conjunctivae normal.   Cardiovascular:      Rate and Rhythm: Regular rhythm. Tachycardia present.   Pulmonary:      Breath sounds: Rales present.   Abdominal:      General: Abdomen is flat.   Musculoskeletal:         General: Normal range of motion.   Skin:     General: Skin is warm.   Neurological:      Mental Status: Mental status is at baseline.          Data   Data reviewed today: I reviewed all medications, new labs and imaging results over the last 24 hours. I personally reviewed no images or EKG's today.    Recent Labs   Lab 12/14/21  1454   WBC 7.1   HGB 12.9*   MCV 93         POTASSIUM 3.4   CHLORIDE 105   CO2 27   BUN 13   CR 0.80   ANIONGAP 6    ANGELA 7.9*   *   ALBUMIN 2.2*   PROTTOTAL 7.6   BILITOTAL 0.5   ALKPHOS 84   *   AST 87*     Most Recent 3 CBC's:Recent Labs   Lab Test 12/14/21  1454 06/06/18  2047   WBC 7.1 8.8   HGB 12.9* 14.8   MCV 93 91    234     Most Recent 3 BMP's:Recent Labs   Lab Test 12/14/21  1454      POTASSIUM 3.4   CHLORIDE 105   CO2 27   BUN 13   CR 0.80   ANIONGAP 6   ANGELA 7.9*   *

## 2021-12-15 PROBLEM — I10 BENIGN ESSENTIAL HTN: Status: ACTIVE | Noted: 2020-10-09

## 2021-12-15 PROBLEM — J96.01 ACUTE RESPIRATORY FAILURE WITH HYPOXIA (H): Status: ACTIVE | Noted: 2021-12-15

## 2021-12-15 LAB
ANION GAP SERPL CALCULATED.3IONS-SCNC: 4 MMOL/L (ref 3–14)
BUN SERPL-MCNC: 13 MG/DL (ref 7–30)
CALCIUM SERPL-MCNC: 7.8 MG/DL (ref 8.5–10.1)
CHLORIDE BLD-SCNC: 109 MMOL/L (ref 94–109)
CO2 SERPL-SCNC: 26 MMOL/L (ref 20–32)
CREAT SERPL-MCNC: 0.72 MG/DL (ref 0.66–1.25)
CRP SERPL-MCNC: 48 MG/L (ref 0–8)
ERYTHROCYTE [DISTWIDTH] IN BLOOD BY AUTOMATED COUNT: 12.5 % (ref 10–15)
GFR SERPL CREATININE-BSD FRML MDRD: >90 ML/MIN/1.73M2
GLUCOSE BLD-MCNC: 134 MG/DL (ref 70–99)
HCT VFR BLD AUTO: 37.1 % (ref 40–53)
HGB BLD-MCNC: 11.9 G/DL (ref 13.3–17.7)
MCH RBC QN AUTO: 30 PG (ref 26.5–33)
MCHC RBC AUTO-ENTMCNC: 32.1 G/DL (ref 31.5–36.5)
MCV RBC AUTO: 94 FL (ref 78–100)
PLATELET # BLD AUTO: 281 10E3/UL (ref 150–450)
POTASSIUM BLD-SCNC: 4.3 MMOL/L (ref 3.4–5.3)
PROCALCITONIN SERPL-MCNC: 0.14 NG/ML
RBC # BLD AUTO: 3.97 10E6/UL (ref 4.4–5.9)
SODIUM SERPL-SCNC: 139 MMOL/L (ref 133–144)
WBC # BLD AUTO: 7 10E3/UL (ref 4–11)

## 2021-12-15 PROCEDURE — 258N000003 HC RX IP 258 OP 636: Performed by: HOSPITALIST

## 2021-12-15 PROCEDURE — 250N000011 HC RX IP 250 OP 636: Performed by: NURSE PRACTITIONER

## 2021-12-15 PROCEDURE — 250N000012 HC RX MED GY IP 250 OP 636 PS 637: Performed by: HOSPITALIST

## 2021-12-15 PROCEDURE — 87205 SMEAR GRAM STAIN: CPT | Performed by: HOSPITALIST

## 2021-12-15 PROCEDURE — 82310 ASSAY OF CALCIUM: CPT | Performed by: HOSPITALIST

## 2021-12-15 PROCEDURE — 120N000001 HC R&B MED SURG/OB

## 2021-12-15 PROCEDURE — 86140 C-REACTIVE PROTEIN: CPT | Performed by: HOSPITALIST

## 2021-12-15 PROCEDURE — 85027 COMPLETE CBC AUTOMATED: CPT | Performed by: HOSPITALIST

## 2021-12-15 PROCEDURE — 250N000011 HC RX IP 250 OP 636: Performed by: HOSPITALIST

## 2021-12-15 PROCEDURE — 87899 AGENT NOS ASSAY W/OPTIC: CPT | Performed by: HOSPITALIST

## 2021-12-15 PROCEDURE — 94799 UNLISTED PULMONARY SVC/PX: CPT

## 2021-12-15 PROCEDURE — 84145 PROCALCITONIN (PCT): CPT | Performed by: NURSE PRACTITIONER

## 2021-12-15 PROCEDURE — 250N000013 HC RX MED GY IP 250 OP 250 PS 637: Performed by: HOSPITALIST

## 2021-12-15 PROCEDURE — 36415 COLL VENOUS BLD VENIPUNCTURE: CPT | Performed by: HOSPITALIST

## 2021-12-15 PROCEDURE — 99233 SBSQ HOSP IP/OBS HIGH 50: CPT | Performed by: NURSE PRACTITIONER

## 2021-12-15 PROCEDURE — 87077 CULTURE AEROBIC IDENTIFY: CPT | Performed by: HOSPITALIST

## 2021-12-15 RX ORDER — LISINOPRIL 10 MG/1
10 TABLET ORAL DAILY
Status: ON HOLD | COMMUNITY
Start: 2020-10-09 | End: 2021-12-15

## 2021-12-15 RX ORDER — TRAMADOL HYDROCHLORIDE 50 MG/1
50 TABLET ORAL EVERY 6 HOURS PRN
COMMUNITY
Start: 2019-12-09 | End: 2022-05-25

## 2021-12-15 RX ORDER — ASPIRIN 81 MG/1
81 TABLET ORAL DAILY
Status: DISCONTINUED | OUTPATIENT
Start: 2021-12-16 | End: 2021-12-27 | Stop reason: HOSPADM

## 2021-12-15 RX ORDER — CYCLOBENZAPRINE HCL 10 MG
10 TABLET ORAL 3 TIMES DAILY PRN
COMMUNITY

## 2021-12-15 RX ORDER — NAPROXEN SODIUM 220 MG
440 TABLET ORAL DAILY PRN
COMMUNITY

## 2021-12-15 RX ADMIN — ENOXAPARIN SODIUM 39 MG: 40 INJECTION SUBCUTANEOUS at 06:43

## 2021-12-15 RX ADMIN — PANTOPRAZOLE SODIUM 40 MG: 40 TABLET, DELAYED RELEASE ORAL at 06:44

## 2021-12-15 RX ADMIN — ALBUTEROL SULFATE 2 PUFF: 90 AEROSOL, METERED RESPIRATORY (INHALATION) at 20:39

## 2021-12-15 RX ADMIN — ENOXAPARIN SODIUM 40 MG: 40 INJECTION SUBCUTANEOUS at 20:39

## 2021-12-15 RX ADMIN — ALBUTEROL SULFATE 2 PUFF: 90 AEROSOL, METERED RESPIRATORY (INHALATION) at 09:21

## 2021-12-15 RX ADMIN — ALBUTEROL SULFATE 2 PUFF: 90 AEROSOL, METERED RESPIRATORY (INHALATION) at 16:25

## 2021-12-15 RX ADMIN — AZITHROMYCIN MONOHYDRATE 250 MG: 500 INJECTION, POWDER, LYOPHILIZED, FOR SOLUTION INTRAVENOUS at 16:25

## 2021-12-15 RX ADMIN — DEXAMETHASONE 6 MG: 2 TABLET ORAL at 13:12

## 2021-12-15 RX ADMIN — ALBUTEROL SULFATE 2 PUFF: 90 AEROSOL, METERED RESPIRATORY (INHALATION) at 13:14

## 2021-12-15 RX ADMIN — CEFTRIAXONE SODIUM 2 G: 2 INJECTION, SOLUTION INTRAVENOUS at 20:39

## 2021-12-15 ASSESSMENT — ACTIVITIES OF DAILY LIVING (ADL)
ADLS_ACUITY_SCORE: 5

## 2021-12-15 ASSESSMENT — MIFFLIN-ST. JEOR: SCORE: 1607.25

## 2021-12-15 NOTE — PLAN OF CARE
Attempted to wean oxygen down to no avail, this writer turned the oxygen down to 7 liters, and with slight activity, such as sitting up, patient sats immediately dipped down to 86%. Therefore oxygen turned back up to 8 liters with oxygen sats of 91%.

## 2021-12-15 NOTE — PHARMACY-MEDICATION REGIMEN REVIEW
Pharmacy Antimicrobial Stewardship/COVID Assessment     Current Antimicrobial Therapy:  Anti-infectives (From now, onward)    Start     Dose/Rate Route Frequency Ordered Stop    12/15/21 1700  cefTRIAXone IN D5W (ROCEPHIN) intermittent infusion 2 g         2 g  100 mL/hr over 30 Minutes Intravenous EVERY 24 HOURS 21 1659    12/15/21 1700  azithromycin (ZITHROMAX) 250 mg in sodium chloride 0.9 % 250 mL intermittent infusion         250 mg  over 1 Hours Intravenous EVERY 24 HOURS 21 165          Indication: COVID: Unvaccinated    Days of Therapy: 2     Pertinent Labs:  Recent Labs   Lab Test 12/15/21  0538 21  1454   WBC 7.0 7.1     Recent Labs   Lab Test 12/15/21  0538 21  2326 21  1454   LACT  --  1.8 1.4   CRP 48.0*  --  67.8*   PCAL 0.14*  --  0.24*          Temperature:  Temp (24hrs), Av  F (36.7  C), Min:97.7  F (36.5  C), Max:98.3  F (36.8  C)    Culture Results:   Positive at [facility] 2021 per H&P    Recommendations/Interventions:  1. D-dimer added to AM labs  2. Add the following per COVID Care Guidelines   -blood culture   -sputum culture (not induced)   - strep pneumonia urine antigen   -legionella urine antigen    Keshawn Cheng, [facility]  December 15, 2021

## 2021-12-15 NOTE — PLAN OF CARE
"Blood pressure 141/88, pulse 96, temperature 98.3  F (36.8  C), temperature source Tympanic, resp. rate 20, height 1.778 m (5' 10\"), weight 76.1 kg (167 lb 12.3 oz), SpO2 97 %.    A&O, VSS, afebrile, started on 5L NC did have to contact RT (see previous note) now on 8L HFNC w/ humidification w/ o2 sats low 90s, Lungs dim w/ crackles to bases, IV SL and flushes well, moves independently in room, good urine output as charted, no BM noted, sputum sample sent this shift, free from falls, uses call light appropriately, makes needs known.    Face to face report given with opportunity to observe patient.    Report given to Khadijah Pool, MICHAEL   12/15/2021  7:42 AM    "

## 2021-12-15 NOTE — PROVIDER NOTIFICATION
Text/page MD LAFLEUR pt O2 sats were mid to high 80s on 5L, pt c/o dry/stuff nose, had him blow nose, turned upto 6L, notified RT and RT put him on 8L HFNC O2 sats 90% now.

## 2021-12-15 NOTE — PROGRESS NOTES
[facility]    Hospitalist Progress Note    Date of Service (when I saw the patient): 12/15/2021    Assessment & Plan       Pneumonia due to 2019 novel coronavirus: Unvaccinated. Symptoms start date: 11/26, Positive Covid test: 11/29. Outside of window for remdesivir. Started on decadron, combivent, lovenox. Needing high flow oxygen. Trend inflammatory markers.       Acute respiratory failure with hypoxia (H):Due to #1. Tolerating HFNC with humidity at 8L/nc at rest, titrate as needed. Add flutter valve.       Benign essential HTN: Pressures have been stable to on the low side. He was prescribed lisinopril by his PCP some time ago but never started it.     DVT Prophylaxis: Enoxaparin (Lovenox) SQ  Code Status: Full Code    Disposition: Expected discharge in several days once oxygen <=4L/nc.    Sue Rea, CNP    Interval History   Still denying dyspnea, minimal cough, no chest pain. No abdominal pain or nausea.      -Data reviewed today: I reviewed all new labs and imaging results over the last 24 hours.     Physical Exam   Temp: 97.4  F (36.3  C) Temp src: Tympanic BP: 126/79 Pulse: 90   Resp: 20 SpO2: (!) 91 % O2 Device: High Flow Nasal Cannula (HFNC) Oxygen Delivery: 8 LPM  Vitals:    12/14/21 1602 12/14/21 1825 12/15/21 0519   Weight: 77.1 kg (170 lb) 77.3 kg (170 lb 6.7 oz) 76.1 kg (167 lb 12.3 oz)     Vital Signs with Ranges  Temp:  [97.4  F (36.3  C)-98.3  F (36.8  C)] 97.4  F (36.3  C)  Pulse:  [] 90  Resp:  [20-24] 20  BP: (101-141)/(65-92) 126/79  SpO2:  [80 %-97 %] 91 %  I/O last 3 completed shifts:  In: 250 [P.O.:250]  Out: 1000 [Urine:1000]    Peripheral IV 12/14/21 Right Upper forearm (Active)   Site Assessment WDL 12/15/21 0918   Line Status Saline locked 12/15/21 0918   Dressing Intervention New dressing  12/14/21 1455   Phlebitis Scale 0-->no symptoms 12/15/21 0918   Infiltration Scale 0 12/15/21 0918   Number of days: 1     Line/device assessment(s) completed for medical  necessity    Constitutional: Awake,alert, no acute distress  Respiratory: Fine crackles bilateral mid-base, no wheezes, rhonchi. Easy respirations.   Cardiovascular: HRR, no murmurs, rubs, thrills.   GI: Soft,nontender,bowel sounds positive.   Skin/Integumen: No unusual rashes, open areas or bruising noted.       Medications     - MEDICATION INSTRUCTIONS -         albuterol  2 puff Inhalation 4x Daily     azithromycin  250 mg Intravenous Q24H     cefTRIAXone  2 g Intravenous Q24H     dexamethasone  6 mg Oral Daily     enoxaparin ANTICOAGULANT  40 mg Subcutaneous BID     pantoprazole  40 mg Oral QAM AC     sodium chloride (PF)  3 mL Intravenous Q8H       Data   Recent Labs   Lab 12/15/21  0538 12/14/21  1849 12/14/21  1454   WBC 7.0  --  7.1   HGB 11.9*  --  12.9*   MCV 94  --  93     --  298     --  138   POTASSIUM 4.3 4.0 3.4   CHLORIDE 109  --  105   CO2 26  --  27   BUN 13  --  13   CR 0.72  --  0.80   ANIONGAP 4  --  6   ANGELA 7.8*  --  7.9*   *  --  128*   ALBUMIN  --   --  2.2*   PROTTOTAL  --   --  7.6   BILITOTAL  --   --  0.5   ALKPHOS  --   --  84   ALT  --   --  119*   AST  --   --  87*       No results found for this or any previous visit (from the past 24 hour(s)).

## 2021-12-15 NOTE — PLAN OF CARE
Prior to Admission Medication Reconciliation:     Medications added:   [] None  [x] As listed below:    Lisinopril- pt reports that he is supposed to be taking- but he never started    Omeprazole- pt prescribed daily but only takes PRN    Tramadol- older prescription for pt's back pain. Has not needed recently.    Medications deleted:   [] None  [x] As listed below:    apap #3- therapy complete    Ibuprofen- therapy complete    Triamcinolone- therapy complete    Ciclopirox- therapy complete    Medications marked for review/removal by attending:  [] None  [x] As listed below:    lisinopril    Changes made to existing medications:   [] None  [] Updated strengths and frequencies to most current  [x] As listed below:    prilosec- noted that pt takes PRN    Last times/dates taken verified with patient:  [x] Yes- completed myself  [] Prepared PTA medlist for review only. (will not be available to review personally)  [] Did not review with patient. Rx verification only. Review completed by nursing.    [] Nurse completed no changes made (double checked entries)  [] Unable to review with patient at this time:  [] Nurse completed/changes made:     Allergies listed at another location:  [x]Allergies match allergies listed in Epic  []Other allergies listed:    Allergy review:    []Did not review: reviewed by nursing  []Did not review: pt unable at this time  []Patient/MAR verified NKDA  [x]Patient/MAR verified current existing allergies: no changes made  []Patient confirmed current existing allergies and new allergies added:    Medication reconciliation sources:   [x]Patient  []Patient family member/emergency contact: **  [x][facility] Report Review  [x]Epic Chart Review  [x]Care Everywhere review:  Medication Sig   aspirin EC 81 MG tablet   Take 1 Tab by mouth one time a day. Do not split or crush.   cyclobenzaprine (FLEXERIL) 10 MG tablet   Take 1 Tab by mouth three times a day.   ibuprofen (MOTRIN) 800 MG tablet   Take 800 mg  by mouth every eight hours as needed.   traMADol (ULTRAM) 50 MG tablet   Take 1 Tab by mouth every six hours as needed for Pain .   lisinopril (Prinivil, Zestril) 10 MG tablet   Take 1 Tab by mouth one time a day.   omeprazole (PriLOSEC) 20 MG delayed-release capsule    Indications: Pain of upper abdomen Take 1 Cap by mouth one time a day. Take before meals. Do not crush.   []Pharmacy med list: **  [x]Pharmacy phone call: [facility]- very old fills of the following. Did not ask pt if they were filled elsewhere.   Tramadol 50 mg q8h prn from 2011  Flexeril 10 mg 2003  [x]Outside meds dispense report: see below  []Nursing home or Assisted Living MAR:  []Other: **    Pharmacy desired at discharge:    Is patient on coumadin?  [x]No    Requests for consultation by provider or pharmacist:   [x] Patient understands why all of their meds were prescribed and how to take them. No questions.   [] Managing party has no questions.   [] Patient/ managing party has questions about the following:  [] Did not review with patient. Cannot assess.     Fill dates and reported compliancy:  [x] Fill dates coincide with reported compliancy for all/most maintenance meds. (pt not taking anything regularly aside from asa)  [] Fill dates do not coincide with compliancy with maintenance meds. See notes in PTA medlist and in comments.    [] Fill dates do not coincide with the following medications but pt reports compliancy:  [] Did not review with patient. Cannot assess.     Historian accuracy:  [x] Excellent- alert and oriented, understands why meds were prescribed and how to take, able to answer specifics  [] Good- alert and oriented, understands why meds were prescribed and how to take, some confusion   [] Fair- alert and oriented, doesn't know medications without list, cannot answer specifics about medications, but has a decent process for which to take at home  [] Poor- does not know medications, may not have a process to take at home, may be  cognitively unable to review at this time  []Medication management done by family member or facility, no concerns about historian accuracy.   [] Did not review with patient. Cannot assess.     Medication Management:  [x] Manages meds independently  [] Family member/ other party manages meds/assists:  [] Meds managed by staff at facility  [] Meds set up by home care, family/other party helps administer  [] Meds set up by home care, self administers  [] Did not review with patient. Cannot assess.     Other medications aside from PTA:  [x] Denies taking any medications aside from those listed in PTA meds  [] Reports taking another medication(s) but cannot recall the name(s)  [] Refuses to say.  [] Did not review with patient. Cannot assess.     Comments: none    Elsy Escudero on 12/15/2021 at 8:07 AM       Discrepancies: [x] No []Not Applicable [x]Yes: listed below    Issues completing PTA medication reconciliation:  [] On hold for a long time  [] Waited for a call back  [] Fax didn't come through  [] Fax took a long time  [] Other:    Notifying appropriate party of changes/additions/discrepancies:  [x]No pertinent changes made, notification not necessary.   [] Notified attending provider via text page/phone call  [] Notified attending provider in person  [] Notified pharmacy  [] Notified nurse  [] Medications have not been reconciled by a provider yet, notification not necessary  [] Pt is not admitted to floor yet, PTA meds completed before admission.     Medications Prior to Admission   Medication Sig Dispense Refill Last Dose     aspirin 81 MG EC tablet Take 81 mg by mouth daily   Past Month at Unknown time     cyclobenzaprine (FLEXERIL) 10 MG tablet Take 10 mg by mouth 3 times daily as needed for muscle spasms   More than a month at Unknown time     naproxen sodium (ANAPROX) 220 MG tablet Take 440 mg by mouth daily as needed for moderate pain   More than a month at Unknown time     omeprazole (PRILOSEC) 20 MG   capsule Take 20 mg by mouth daily   More than a month at Unknown time     traMADol (ULTRAM) 50 MG tablet Take 50 mg by mouth every 6 hours as needed   More than a month at Unknown time     lisinopril (ZESTRIL) 10 MG tablet Take 10 mg by mouth daily (Patient not taking: Reported on 12/15/2021)   Not Taking at Unknown time             Medication Dispense History (from 12/15/2020 to 12/14/2021)  Expand All  Collapse All    Ciclopirox Olamine     Dispensed Days Supply Quantity Provider Pharmacy   CICLOPIROX 0.77%    [facility] 07/02/2021 5 30 g [id number] ...           Omeprazole     Dispensed Days Supply Quantity Provider Pharmacy   OMEPRAZOLE 20MG CAP 05/07/2021 30 30 Units [id number] ...   OMEPRAZOLE 20MG CAP 04/04/2021 30 30 Units [id number] ...   OMEPRAZOLE 20MG CAP 03/04/2021 30 30 Units [id number] ...           Triamcinolone Acetonide     Dispensed Days Supply Quantity Provider Pharmacy   TRIAMCINOLON 0.1%   [facility] 04/05/2021 30 45 g [id number] ...           Disclaimer    Certain dispenses may not be available or accurate in this report, including over-the-counter medications, low cost prescriptions, prescriptions paid for by the patient or non-participating sources, or errors in insurance claims information. The provider should independently verify medication history with the patient.     External Sources

## 2021-12-15 NOTE — PLAN OF CARE
Face to face report given with opportunity to observe patient.    Report given to MICHAEL Padilla RN   12/15/2021  1:03 AM

## 2021-12-15 NOTE — PLAN OF CARE
VSS, afebrile, remains on 8L O2 HFNC w/ sats low 90's. Pt denies pain, pt denies SOB. Does desat with activity to mid 80's. Appetite adequate this shift.  Independent in room. Call light within reach, makes need known.     Face to face report given with opportunity to observe patient.    Report given to Donya Leonard RN   12/15/2021  3:46 PM

## 2021-12-15 NOTE — PLAN OF CARE
Covid (+) admit. O2 at 6l NC. Denies pain. SL intact. Appetite good for dinner. Dr MEYERS in to see.       Sepsis flag at [id number]    Face to face report given with opportunity to observe patient.    Report given to Charis Dominguez RN   12/14/2021  11:18 PM

## 2021-12-15 NOTE — PLAN OF CARE
Talked to RT face to face. Pt c/o dryness, did bump O2 up from 5L to 6L NC O2 sats were mid to high 80s, RT placing on HFNC

## 2021-12-15 NOTE — ED PROVIDER NOTES
History     Chief Complaint   Patient presents with     Shortness of Breath     HPI  Tomasz Newman is a 56 year old male diagnosed with covid approximately 15 days ago. Went to clinic today for paperwork and was found to be hypoxic to 70s. No hx of immunocompromise. Still has a cough and is SOB but no longer has fevers/myalgias. No hx of PE.  No history of asthma or COPD.  He had some diarrhea but no longer has nausea/vomiting/diarrhea.  He does still have some loss of sense of taste and smell.  No chest pain.    Allergies:  Allergies   Allergen Reactions     Percocet [Oxycodone-Acetaminophen]      Facial Swelling       Problem List:    Patient Active Problem List    Diagnosis Date Noted     Pneumonia due to 2019 novel coronavirus 12/14/2021     Priority: Medium        Past Medical History:    No past medical history on file.    Past Surgical History:    Past Surgical History:   Procedure Laterality Date     HERNIA REPAIR  2002       Family History:    Family History   Problem Relation Age of Onset     Cancer - colorectal Father      Diabetes Brother      Heart Disease Mother         heart disease       Social History:  Marital Status:  Single [1]  Social History     Tobacco Use     Smoking status: Never Smoker     Smokeless tobacco: Never Used     Tobacco comment: no passive exposure   Substance Use Topics     Alcohol use: Yes     Comment: wine 3 glasses occasionally     Drug use: Not on file        Medications:    No current outpatient medications on file.        Review of Systems   All other systems reviewed and are negative.      Physical Exam   BP: 125/83  Pulse: 117  Temp: 97.7  F (36.5  C)  Resp: 24  Weight: 77.1 kg (170 lb)  SpO2: 92 %      Physical Exam  Vitals and nursing note reviewed.   Constitutional:       General: He is not in acute distress.     Appearance: He is well-developed. He is not toxic-appearing or diaphoretic.   HENT:      Head: Normocephalic and atraumatic.      Mouth/Throat:       Mouth: Mucous membranes are moist.      Pharynx: Oropharynx is clear.   Eyes:      Extraocular Movements: Extraocular movements intact.      Pupils: Pupils are equal, round, and reactive to light.   Cardiovascular:      Rate and Rhythm: Normal rate and regular rhythm.   Pulmonary:      Effort: Pulmonary effort is normal. No respiratory distress.      Breath sounds: Rhonchi present.   Chest:      Chest wall: No tenderness.   Abdominal:      Tenderness: There is no abdominal tenderness.   Musculoskeletal:         General: Normal range of motion.      Cervical back: Normal range of motion and neck supple.      Right lower leg: No edema.      Left lower leg: No edema.   Skin:     General: Skin is warm and dry.      Capillary Refill: Capillary refill takes less than 2 seconds.   Neurological:      General: No focal deficit present.      Mental Status: He is alert and oriented to person, place, and time.   Psychiatric:         Mood and Affect: Mood normal.         Behavior: Behavior normal.         ED Course          ED Course as of 12/14/21 1819   Tue Dec 14, 2021   1659 Diagnosed with covid on 11/29. Now with increasing SOB. Has lingering cough and some los sof sense of taste/smell. No longer has myalgias. No fevers at home. Had some diarrhea but this is improving.   1712 Discussed with Dr. Vera, who agrees with plan for admission.     Procedures            Results for orders placed or performed during the hospital encounter of 12/14/21 (from the past 24 hour(s))   Comprehensive metabolic panel   Result Value Ref Range    Sodium 138 133 - 144 mmol/L    Potassium 3.4 3.4 - 5.3 mmol/L    Chloride 105 94 - 109 mmol/L    Carbon Dioxide (CO2) 27 20 - 32 mmol/L    Anion Gap 6 3 - 14 mmol/L    Urea Nitrogen 13 7 - 30 mg/dL    Creatinine 0.80 0.66 - 1.25 mg/dL    Calcium 7.9 (L) 8.5 - 10.1 mg/dL    Glucose 128 (H) 70 - 99 mg/dL    Alkaline Phosphatase 84 40 - 150 U/L    AST 87 (H) 0 - 45 U/L     (H) 0 - 70 U/L     Protein Total 7.6 6.8 - 8.8 g/dL    Albumin 2.2 (L) 3.4 - 5.0 g/dL    Bilirubin Total 0.5 0.2 - 1.3 mg/dL    GFR Estimate >90 >60 mL/min/1.73m2   CBC with platelets differential    Narrative    The following orders were created for panel order CBC with platelets differential.  Procedure                               Abnormality         Status                     ---------                               -----------         ------                     CBC with platelets and d...[[id number]]  Abnormal            Final result                 Please view results for these tests on the individual orders.   Lactic acid whole blood STAT   Result Value Ref Range    Lactic Acid 1.4 0.7 - 2.0 mmol/L   D dimer quantitative   Result Value Ref Range    D-Dimer Quantitative 13.17 (H) 0.00 - 0.50 ug/mL FEU    Narrative    This D-dimer assay is intended for use in conjunction with a clinical pretest probability assessment model to exclude pulmonary embolism (PE) and deep venous thrombosis (DVT) in outpatients suspected of PE or DVT. The cut-off value is 0.50 ug/mL FEU.   CRP inflammation   Result Value Ref Range    CRP Inflammation 67.8 (H) 0.0 - 8.0 mg/L   Procalcitonin   Result Value Ref Range    Procalcitonin 0.24 (H) <0.05 ng/mL   [place] Draw    Narrative    The following orders were created for panel order [place] Draw.  Procedure                               Abnormality         Status                     ---------                               -----------         ------                     Extra Blood Culture Bottle[[id number]]                       Final result               Extra Red Top Tube[[id number]]                               Final result                 Please view results for these tests on the individual orders.   CBC with platelets and differential   Result Value Ref Range    WBC Count 7.1 4.0 - 11.0 10e3/uL    RBC Count 4.28 (L) 4.40 - 5.90 10e6/uL    Hemoglobin 12.9 (L) 13.3 - 17.7 g/dL    Hematocrit 39.9 (L) 40.0  - 53.0 %    MCV 93 78 - 100 fL    MCH 30.1 26.5 - 33.0 pg    MCHC 32.3 31.5 - 36.5 g/dL    RDW 12.7 10.0 - 15.0 %    Platelet Count 298 150 - 450 10e3/uL    % Neutrophils 81 %    % Lymphocytes 10 %    % Monocytes 6 %    % Eosinophils 1 %    % Basophils 0 %    % Immature Granulocytes 2 %    NRBCs per 100 WBC 0 <1 /100    Absolute Neutrophils 5.7 1.6 - 8.3 10e3/uL    Absolute Lymphocytes 0.7 (L) 0.8 - 5.3 10e3/uL    Absolute Monocytes 0.5 0.0 - 1.3 10e3/uL    Absolute Eosinophils 0.1 0.0 - 0.7 10e3/uL    Absolute Basophils 0.0 0.0 - 0.2 10e3/uL    Absolute Immature Granulocytes 0.1 <=0.4 10e3/uL    Absolute NRBCs 0.0 10e3/uL   Extra Blood Culture Bottle   Result Value Ref Range    Hold Specimen JIC    Extra Red Top Tube   Result Value Ref Range    Hold Specimen JIC    CTA Chest with Contrast    Narrative    PROCEDURE: CTA CHEST WITH CONTRAST 12/14/2021 4:34 PM    HISTORY: PE suspected, low/intermediate prob, positive D-dimer      COMPARISONS: None.    Meds/Dose Given:  60mL    TECHNIQUE: CT angiogram of the chest with sagittal and coronal mip  reconstructions    FINDINGS: There are no pulmonary emboli. The heart is normal in size.  The thoracic aorta appears normal.    There are widespread pulmonary parenchymal opacities consistent with  pneumonia. There are mildly enlarged hilar and mediastinal lymph  nodes. Graph the upper portion of the liver spleen and pancreas appear  normal. The adrenal glands are normal. Regional skeleton is intact.         Impression    IMPRESSION: Widespread pulmonary parenchymal opacities consistent with  pneumonia.    No pulmonary emboli.    MEKHI CARVAJAL MD         SYSTEM ID:  [id number]       Medications   sodium chloride (PF) 0.9% PF flush 3 mL (has no administration in time range)   sodium chloride (PF) 0.9% PF flush 3 mL (has no administration in time range)   azithromycin (ZITHROMAX) 500 mg in sodium chloride 0.9 % 250 mL intermittent infusion (500 mg Intravenous New Bag  12/14/21 1702)   0.9% sodium chloride BOLUS (0 mLs Intravenous Stopped 12/14/21 1730)   cefTRIAXone in d5w (ROCEPHIN) intermittent infusion 1 g (0 g Intravenous Stopped 12/14/21 1702)   ipratropium - albuterol 0.5 mg/2.5 mg/3 mL (DUONEB) neb solution 3 mL (3 mLs Nebulization Given 12/14/21 1634)   iopamidol (ISOVUE-370) solution 60 mL (60 mLs Intravenous Given 12/14/21 1619)   sodium chloride (PF) 0.9% PF flush 100 mL (100 mLs Intravenous Given 12/14/21 1619)   dexamethasone (DECADRON) tablet 6 mg (6 mg Oral Given 12/14/21 1702)       Assessments & Plan (with Medical Decision Making)     I have reviewed the nursing notes.    56yoM with recent covid diagnosis now with hypoxia. He actually feels reasonably well. Given his acute hypoxia, plan for CTa to rule out PE. No PE but extensive bilateral pneumonia. Treating for superimposed antibiotics given elevated procal. No chest pain or indication this is cardiac. No evidence of CHF. Otherwise stable. Given decadron for covid. Other labs ordered as prognostication and demarcation of covid status. Repeat covid not ordered as patient has previously positive test within 15 days.    I have reviewed the findings, diagnosis, plan and need for follow up with the patient.       ED to Inpatient Handoff:    Discussed with Dr. Vera  Patient accepted for Inpatient Stay  Pending studies include none  Code Status: Not Addressed           Current Discharge Medication List          Final diagnoses:   Pneumonia due to 2019 novel coronavirus       12/14/2021   [facility]     Chris Guevara MD  12/15/21 1424

## 2021-12-16 LAB
ANION GAP SERPL CALCULATED.3IONS-SCNC: 3 MMOL/L (ref 3–14)
BUN SERPL-MCNC: 16 MG/DL (ref 7–30)
CALCIUM SERPL-MCNC: 8.3 MG/DL (ref 8.5–10.1)
CHLORIDE BLD-SCNC: 109 MMOL/L (ref 94–109)
CO2 SERPL-SCNC: 27 MMOL/L (ref 20–32)
CREAT SERPL-MCNC: 0.72 MG/DL (ref 0.66–1.25)
CRP SERPL-MCNC: 22.6 MG/L (ref 0–8)
D DIMER PPP FEU-MCNC: 8.44 UG/ML FEU (ref 0–0.5)
ERYTHROCYTE [DISTWIDTH] IN BLOOD BY AUTOMATED COUNT: 12.6 % (ref 10–15)
GFR SERPL CREATININE-BSD FRML MDRD: >90 ML/MIN/1.73M2
GLUCOSE BLD-MCNC: 124 MG/DL (ref 70–99)
HCT VFR BLD AUTO: 38.6 % (ref 40–53)
HGB BLD-MCNC: 12.5 G/DL (ref 13.3–17.7)
MCH RBC QN AUTO: 30.8 PG (ref 26.5–33)
MCHC RBC AUTO-ENTMCNC: 32.4 G/DL (ref 31.5–36.5)
MCV RBC AUTO: 95 FL (ref 78–100)
PLATELET # BLD AUTO: 287 10E3/UL (ref 150–450)
POTASSIUM BLD-SCNC: 4.9 MMOL/L (ref 3.4–5.3)
RBC # BLD AUTO: 4.06 10E6/UL (ref 4.4–5.9)
S PNEUM AG SPEC QL: NEGATIVE
SODIUM SERPL-SCNC: 139 MMOL/L (ref 133–144)
WBC # BLD AUTO: 9.6 10E3/UL (ref 4–11)

## 2021-12-16 PROCEDURE — 99233 SBSQ HOSP IP/OBS HIGH 50: CPT | Performed by: NURSE PRACTITIONER

## 2021-12-16 PROCEDURE — 120N000001 HC R&B MED SURG/OB

## 2021-12-16 PROCEDURE — 250N000013 HC RX MED GY IP 250 OP 250 PS 637: Performed by: HOSPITALIST

## 2021-12-16 PROCEDURE — 82310 ASSAY OF CALCIUM: CPT | Performed by: NURSE PRACTITIONER

## 2021-12-16 PROCEDURE — 250N000013 HC RX MED GY IP 250 OP 250 PS 637: Performed by: NURSE PRACTITIONER

## 2021-12-16 PROCEDURE — 258N000003 HC RX IP 258 OP 636: Performed by: HOSPITALIST

## 2021-12-16 PROCEDURE — 250N000012 HC RX MED GY IP 250 OP 636 PS 637: Performed by: HOSPITALIST

## 2021-12-16 PROCEDURE — 86140 C-REACTIVE PROTEIN: CPT | Performed by: HOSPITALIST

## 2021-12-16 PROCEDURE — 250N000011 HC RX IP 250 OP 636: Performed by: HOSPITALIST

## 2021-12-16 PROCEDURE — 36415 COLL VENOUS BLD VENIPUNCTURE: CPT | Performed by: NURSE PRACTITIONER

## 2021-12-16 PROCEDURE — 85379 FIBRIN DEGRADATION QUANT: CPT | Performed by: NURSE PRACTITIONER

## 2021-12-16 PROCEDURE — 85027 COMPLETE CBC AUTOMATED: CPT | Performed by: HOSPITALIST

## 2021-12-16 PROCEDURE — 250N000011 HC RX IP 250 OP 636: Performed by: NURSE PRACTITIONER

## 2021-12-16 RX ADMIN — ALBUTEROL SULFATE 2 PUFF: 90 AEROSOL, METERED RESPIRATORY (INHALATION) at 16:00

## 2021-12-16 RX ADMIN — ALBUTEROL SULFATE 2 PUFF: 90 AEROSOL, METERED RESPIRATORY (INHALATION) at 08:56

## 2021-12-16 RX ADMIN — ASPIRIN 81 MG: 81 TABLET, COATED ORAL at 08:55

## 2021-12-16 RX ADMIN — DEXAMETHASONE 6 MG: 2 TABLET ORAL at 08:55

## 2021-12-16 RX ADMIN — ALBUTEROL SULFATE 2 PUFF: 90 AEROSOL, METERED RESPIRATORY (INHALATION) at 20:26

## 2021-12-16 RX ADMIN — PANTOPRAZOLE SODIUM 40 MG: 40 TABLET, DELAYED RELEASE ORAL at 08:55

## 2021-12-16 RX ADMIN — CEFTRIAXONE SODIUM 2 G: 2 INJECTION, SOLUTION INTRAVENOUS at 20:25

## 2021-12-16 RX ADMIN — ENOXAPARIN SODIUM 40 MG: 40 INJECTION SUBCUTANEOUS at 08:55

## 2021-12-16 RX ADMIN — SALINE NASAL SPRAY 1 SPRAY: 1.5 SOLUTION NASAL at 13:03

## 2021-12-16 RX ADMIN — ALBUTEROL SULFATE 2 PUFF: 90 AEROSOL, METERED RESPIRATORY (INHALATION) at 13:03

## 2021-12-16 RX ADMIN — AZITHROMYCIN MONOHYDRATE 250 MG: 500 INJECTION, POWDER, LYOPHILIZED, FOR SOLUTION INTRAVENOUS at 15:51

## 2021-12-16 RX ADMIN — ENOXAPARIN SODIUM 40 MG: 40 INJECTION SUBCUTANEOUS at 20:25

## 2021-12-16 ASSESSMENT — ACTIVITIES OF DAILY LIVING (ADL)
ADLS_ACUITY_SCORE: 5

## 2021-12-16 ASSESSMENT — MIFFLIN-ST. JEOR: SCORE: 1598.25

## 2021-12-16 NOTE — PLAN OF CARE
"Blood pressure 131/87, pulse 81, temperature 97  F (36.1  C), temperature source Tympanic, resp. rate 18, height 1.778 m (5' 10\"), weight 75.2 kg (165 lb 12.6 oz), SpO2 92 %.    A&O, VSS, afebrile, denies pain, IV SL to R forearm, pt started on 8l HHFNC did titrate down to 6l HHFNC and tolerating well, did desat down w/ activity but recovers pretty quickly, lungs clear except fine crackles noted to RLL, no GI/ concerns, good urine output, bowel sounds active, no skin concerns noted, free from falls, uses call light appropriately, makes needs known, slept majority of shift.    Face to face report given with opportunity to observe patient.    Report given to Corine Lucio, RNs    Valerie Schuster RN   12/16/2021  7:33 AM      "

## 2021-12-16 NOTE — PROGRESS NOTES
[facility]    Hospitalist Progress Note    Date of Service (when I saw the patient): 12/16/2021    Assessment & Plan       Pneumonia due to 2019 novel coronavirus: Unvaccinated. Symptoms start date: 11/26, Positive Covid test: 11/29. Outside of window for remdesivir. Started on decadron, combivent, lovenox. Needing high flow oxygen. Trend inflammatory markers.       Acute respiratory failure with hypoxia (H):Due to #1. Tolerating HFNC with humidity at 8L/nc at rest, titrate as needed. Add flutter valve  -12/16: Now tolerating 6L at rest, however he is dropping his sats for longer periods with any activity. Suspect he needs high flow, will increase to 8L/HFNC and keep him there overnight.       Benign essential HTN: Pressures have been stable to on the low side. He was prescribed lisinopril by his PCP some time ago but never started it.     DVT Prophylaxis: Enoxaparin (Lovenox) SQ  Code Status: Full Code    Disposition: Expected discharge in several days once oxygen <=4L/nc.    Sue Rea, CNP    Interval History   Still denying dyspnea, minimal cough, no chest pain. No abdominal pain or nausea.      -Data reviewed today: I reviewed all new labs and imaging results over the last 24 hours.     Physical Exam   Temp: 98.3  F (36.8  C) Temp src: Tympanic BP: 118/74 Pulse: 108   Resp: 20 SpO2: (!) 90 % O2 Device: High Flow Nasal Cannula (HFNC) Oxygen Delivery: 10 LPM  Vitals:    12/14/21 1825 12/15/21 0519 12/16/21 0600   Weight: 77.3 kg (170 lb 6.7 oz) 76.1 kg (167 lb 12.3 oz) 75.2 kg (165 lb 12.6 oz)     Vital Signs with Ranges  Temp:  [97  F (36.1  C)-98.3  F (36.8  C)] 98.3  F (36.8  C)  Pulse:  [] 108  Resp:  [18-20] 20  BP: (109-131)/(71-87) 118/74  SpO2:  [88 %-95 %] 90 %  I/O last 3 completed shifts:  In: 120 [P.O.:120]  Out: 1300 [Urine:1300]    Peripheral IV 12/14/21 Right Upper forearm (Active)   Site Assessment WDL 12/16/21 0864   Line Status Saline locked 12/16/21 0802   Dressing  Intervention New dressing  12/14/21 1455   Phlebitis Scale 0-->no symptoms 12/16/21 0853   Infiltration Scale 0 12/16/21 0853   Number of days: 2     Line/device assessment(s) completed for medical necessity    Constitutional: Awake,alert, no acute distress  Respiratory: Fine crackles bilateral base, no wheezes, rhonchi. Easy respirations.   Cardiovascular: HRR, no murmurs, rubs, thrills.   GI: Soft,nontender,bowel sounds positive.   Skin/Integumen: No unusual rashes, open areas or bruising noted.       Medications     - MEDICATION INSTRUCTIONS -         albuterol  2 puff Inhalation 4x Daily     aspirin  81 mg Oral Daily     azithromycin  250 mg Intravenous Q24H     cefTRIAXone  2 g Intravenous Q24H     dexamethasone  6 mg Oral Daily     enoxaparin ANTICOAGULANT  40 mg Subcutaneous BID     pantoprazole  40 mg Oral QAM AC     sodium chloride (PF)  3 mL Intravenous Q8H       Data   Recent Labs   Lab 12/16/21  0541 12/15/21  0538 12/14/21  1849 12/14/21  1454   WBC 9.6 7.0  --  7.1   HGB 12.5* 11.9*  --  12.9*   MCV 95 94  --  93    281  --  298    139  --  138   POTASSIUM 4.9 4.3 4.0 3.4   CHLORIDE 109 109  --  105   CO2 27 26  --  27   BUN 16 13  --  13   CR 0.72 0.72  --  0.80   ANIONGAP 3 4  --  6   ANGELA 8.3* 7.8*  --  7.9*   * 134*  --  128*   ALBUMIN  --   --   --  2.2*   PROTTOTAL  --   --   --  7.6   BILITOTAL  --   --   --  0.5   ALKPHOS  --   --   --  84   ALT  --   --   --  119*   AST  --   --   --  87*       No results found for this or any previous visit (from the past 24 hour(s)).

## 2021-12-16 NOTE — PROGRESS NOTES
Assessment completed by Desi Alvarado with patient.    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

## 2021-12-16 NOTE — PROGRESS NOTES
Attempt to reach patient x's 2 unsuccessfull will attempt again when able.    Desi Alvarado RN on 12/15/2021 at 1500

## 2021-12-16 NOTE — PLAN OF CARE
VSS, afebrile, on 8L O2 w/ sats low 90's. Desats w/ activity, taking 20+ min to recover. O2 was increased to 10L briefly while pt recovered. Per provider pt increased from 6L to 8L. Sats currently low 90's. Pt nasal cavity dry and bleeding. Saline spray ordered. No complaints of pain. Pt had BM this shift. Is using bedside commode. IV SL. Resistance w/ flush but flushes ok. Call light within reach, makes needs known.     Face to face report given with opportunity to observe patient.    Report given to Oliva Leonard RN   12/16/2021  2:52 PM

## 2021-12-16 NOTE — PLAN OF CARE
"Most recent vitals: /81 (BP Location: Right arm)   Pulse 86   Temp 97.2  F (36.2  C) (Tympanic)   Resp 20   Ht 1.778 m (5' 10\")   Wt 76.1 kg (167 lb 12.3 oz)   SpO2 95%   BMI 24.07 kg/m      Alert and oriented, able to make needs known. VSS. Afebrile. Denies pain. Lungs fine crackles to right side. Dry cough. Attempted to wean to 7L but dipped into 80s. Remains on 8L of oxygen, sats low to mid 90s. Denies SOB or KIRKLAND. Bowel sounds active. IV SL. Zithromax and rocephin infused per order. Good appetite. Independent. Calls appropriately.    Face to face report given with opportunity to observe patient.    Report given to Valerie Mcgrath RN   12/15/2021  11:18 PM        "

## 2021-12-16 NOTE — PHARMACY-MEDICATION REGIMEN REVIEW
Pharmacy Antimicrobial Stewardship Assessment     Current Antimicrobial Therapy:  Anti-infectives (From now, onward)    Start     Dose/Rate Route Frequency Ordered Stop    12/15/21 2100  cefTRIAXone IN D5W (ROCEPHIN) intermittent infusion 2 g         2 g  100 mL/hr over 30 Minutes Intravenous EVERY 24 HOURS 21 1830 21 2059    12/15/21 1600  azithromycin (ZITHROMAX) 250 mg in sodium chloride 0.9 % 250 mL intermittent infusion         250 mg  over 1 Hours Intravenous EVERY 24 HOURS 21 1830 21 1559        Indication: COVID: Unvaccinated     Days of Therapy: 3     Pertinent Labs:  Recent Labs   Lab Test 21  0541 12/15/21  0538 21  1454   WBC 9.6 7.0 7.1     Recent Labs   Lab Test 21  0541 12/15/21  0538 21  2326 21  1454 18  2047   LACT  --   --  1.8 1.4  --    CRP 22.6* 48.0*  --  67.8* <2.9   PCAL  --  0.14*  --  0.24*  --         Temperature:  Temp (24hrs), Av.4  F (36.3  C), Min:97  F (36.1  C), Max:97.8  F (36.6  C)    Culture Results:   (12/15/21) Strep pneumo antigen  (12/15/21) Respiratory culture:    Positive at [facility] 2021 per H&P    Recommendations/Interventions:  1. Add the following per COVID Care Guidelines              -blood culture              -legionella urine antigen    Keshawn Cheng, [facility]  2021

## 2021-12-17 LAB
ANION GAP SERPL CALCULATED.3IONS-SCNC: 2 MMOL/L (ref 3–14)
BUN SERPL-MCNC: 21 MG/DL (ref 7–30)
CALCIUM SERPL-MCNC: 8.5 MG/DL (ref 8.5–10.1)
CHLORIDE BLD-SCNC: 112 MMOL/L (ref 94–109)
CO2 SERPL-SCNC: 28 MMOL/L (ref 20–32)
CREAT SERPL-MCNC: 0.81 MG/DL (ref 0.66–1.25)
CRP SERPL-MCNC: 14.2 MG/L (ref 0–8)
ERYTHROCYTE [DISTWIDTH] IN BLOOD BY AUTOMATED COUNT: 12.8 % (ref 10–15)
GFR SERPL CREATININE-BSD FRML MDRD: >90 ML/MIN/1.73M2
GLUCOSE BLD-MCNC: 111 MG/DL (ref 70–99)
HCT VFR BLD AUTO: 40.4 % (ref 40–53)
HGB BLD-MCNC: 13.1 G/DL (ref 13.3–17.7)
MCH RBC QN AUTO: 30.8 PG (ref 26.5–33)
MCHC RBC AUTO-ENTMCNC: 32.4 G/DL (ref 31.5–36.5)
MCV RBC AUTO: 95 FL (ref 78–100)
PLATELET # BLD AUTO: 342 10E3/UL (ref 150–450)
POTASSIUM BLD-SCNC: 4.9 MMOL/L (ref 3.4–5.3)
RBC # BLD AUTO: 4.25 10E6/UL (ref 4.4–5.9)
SODIUM SERPL-SCNC: 142 MMOL/L (ref 133–144)
WBC # BLD AUTO: 9.5 10E3/UL (ref 4–11)

## 2021-12-17 PROCEDURE — 86140 C-REACTIVE PROTEIN: CPT | Performed by: HOSPITALIST

## 2021-12-17 PROCEDURE — 258N000003 HC RX IP 258 OP 636: Performed by: HOSPITALIST

## 2021-12-17 PROCEDURE — 250N000012 HC RX MED GY IP 250 OP 636 PS 637: Performed by: HOSPITALIST

## 2021-12-17 PROCEDURE — 250N000011 HC RX IP 250 OP 636: Performed by: NURSE PRACTITIONER

## 2021-12-17 PROCEDURE — 250N000013 HC RX MED GY IP 250 OP 250 PS 637: Performed by: HOSPITALIST

## 2021-12-17 PROCEDURE — 36415 COLL VENOUS BLD VENIPUNCTURE: CPT | Performed by: HOSPITALIST

## 2021-12-17 PROCEDURE — 120N000001 HC R&B MED SURG/OB

## 2021-12-17 PROCEDURE — 85014 HEMATOCRIT: CPT | Performed by: HOSPITALIST

## 2021-12-17 PROCEDURE — 80048 BASIC METABOLIC PNL TOTAL CA: CPT | Performed by: NURSE PRACTITIONER

## 2021-12-17 PROCEDURE — 250N000011 HC RX IP 250 OP 636: Performed by: HOSPITALIST

## 2021-12-17 PROCEDURE — 250N000013 HC RX MED GY IP 250 OP 250 PS 637: Performed by: NURSE PRACTITIONER

## 2021-12-17 PROCEDURE — 99232 SBSQ HOSP IP/OBS MODERATE 35: CPT | Performed by: NURSE PRACTITIONER

## 2021-12-17 PROCEDURE — 258N000003 HC RX IP 258 OP 636: Performed by: NURSE PRACTITIONER

## 2021-12-17 RX ORDER — VANCOMYCIN HYDROCHLORIDE 1 G/200ML
1000 INJECTION, SOLUTION INTRAVENOUS EVERY 12 HOURS
Status: DISCONTINUED | OUTPATIENT
Start: 2021-12-18 | End: 2021-12-20

## 2021-12-17 RX ADMIN — ALBUTEROL SULFATE 2 PUFF: 90 AEROSOL, METERED RESPIRATORY (INHALATION) at 10:02

## 2021-12-17 RX ADMIN — ASPIRIN 81 MG: 81 TABLET, COATED ORAL at 10:01

## 2021-12-17 RX ADMIN — ALBUTEROL SULFATE 2 PUFF: 90 AEROSOL, METERED RESPIRATORY (INHALATION) at 21:18

## 2021-12-17 RX ADMIN — ALBUTEROL SULFATE 2 PUFF: 90 AEROSOL, METERED RESPIRATORY (INHALATION) at 16:07

## 2021-12-17 RX ADMIN — ENOXAPARIN SODIUM 40 MG: 40 INJECTION SUBCUTANEOUS at 21:17

## 2021-12-17 RX ADMIN — CEFTRIAXONE SODIUM 2 G: 2 INJECTION, SOLUTION INTRAVENOUS at 21:17

## 2021-12-17 RX ADMIN — AZITHROMYCIN MONOHYDRATE 250 MG: 500 INJECTION, POWDER, LYOPHILIZED, FOR SOLUTION INTRAVENOUS at 16:07

## 2021-12-17 RX ADMIN — ENOXAPARIN SODIUM 40 MG: 40 INJECTION SUBCUTANEOUS at 10:01

## 2021-12-17 RX ADMIN — VANCOMYCIN HYDROCHLORIDE 1500 MG: 1 INJECTION, POWDER, LYOPHILIZED, FOR SOLUTION INTRAVENOUS at 10:56

## 2021-12-17 RX ADMIN — PANTOPRAZOLE SODIUM 40 MG: 40 TABLET, DELAYED RELEASE ORAL at 10:01

## 2021-12-17 RX ADMIN — ALBUTEROL SULFATE 2 PUFF: 90 AEROSOL, METERED RESPIRATORY (INHALATION) at 12:42

## 2021-12-17 RX ADMIN — DEXAMETHASONE 6 MG: 2 TABLET ORAL at 10:01

## 2021-12-17 ASSESSMENT — ACTIVITIES OF DAILY LIVING (ADL)
ADLS_ACUITY_SCORE: 5

## 2021-12-17 ASSESSMENT — MIFFLIN-ST. JEOR: SCORE: 1594.25

## 2021-12-17 NOTE — PLAN OF CARE
"Most recent vitals: /79 (BP Location: Left arm, Patient Position: Supine)   Pulse 88   Temp 96.9  F (36.1  C) (Tympanic)   Resp 20   Ht 1.778 m (5' 10\")   Wt 75.2 kg (165 lb 12.6 oz)   SpO2 96%   BMI 23.79 kg/m      Alert and oriented, able to make needs known. VSS. Afebrile. Denies pain. Tachycardic at times. Lungs have crackles to right middle and lower lobe. Remains on 8L of oxygen, sats low 90s. Does desat to 80s with movement but has been recovering within a few minutes. Bowel sounds active. Voiding adequately. IV SL. Zithromax and rocephin infused per order. Good appetite. Standby assist. Calls appropriately.    Face to face report given with opportunity to observe patient.    Report given to Thom Mcgrath RN   12/16/2021  11:00 PM      "

## 2021-12-17 NOTE — PLAN OF CARE
VSS, afebrile, remains on 8L O2 HFNC w/ sats low 90's. Desats to mid 80's with activity but recovers quickly. Denies any SOB. Denies pain. Pt IV infiltrated while vancomycin infusing. IV was stopped. Pt had mild erythema noted to IV site. Pt denies pain, was given an ice pack for comfort. Erythema resolved at end of shift. Pt reports no discomfort.  New IV start to left wrist. ABX infusion restarted. Call light within reach, makes needs known.     Face to face report given with opportunity to observe patient.    Report given to Oliva Leonard RN   12/17/2021  3:10 PM

## 2021-12-17 NOTE — PLAN OF CARE
"Most recent vitals: /85 (BP Location: Left arm, Patient Position: Supine)   Pulse 87   Temp (!) 96.6  F (35.9  C) (Tympanic)   Resp 24   Ht 1.778 m (5' 10\")   Wt 75.2 kg (165 lb 12.6 oz)   SpO2 (!) 91%   BMI 23.79 kg/m    Pt has been awake on and off t/o this shift. Denies pain this shift; VS and Assessments as charted. Remains on 8 Lpm via HFNC. Satting mid to low 90s. Does dip into 80s with any activities. IV SL. No ABX this shift. Voids spontaneously with bedside commode.     Safety:  Bed in lowest position, call button within reach. Remains on Special Covid Isolation Precautions.     Face to face report given with opportunity to observe patient.    Report given to MICHAEL Lucio and MICHAEL Pool RN   12/17/2021  7:25 AM      "

## 2021-12-17 NOTE — PROGRESS NOTES
[facility]    Hospitalist Progress Note    Date of Service (when I saw the patient): 12/17/2021    Assessment & Plan       Pneumonia due to 2019 novel coronavirus: Unvaccinated. Symptoms start date: 11/26, Positive Covid test: 11/29. Outside of window for remdesivir. Started on decadron, combivent, lovenox. Needing high flow oxygen. Trend inflammatory markers.       Acute respiratory failure with hypoxia (H):Due to #1. Tolerating HFNC with humidity at 8L/nc at rest, titrate as needed. Add flutter valve  -12/16: Now tolerating 6L at rest, however he is dropping his sats for longer periods with any activity. Suspect he needs high flow, will increase to 8L/HFNC and keep him there overnight.   -12/17: recovery time improved on 8L/nc, continue same overnight      Benign essential HTN: Pressures have been stable to on the low side. He was prescribed lisinopril by his PCP some time ago but never started it.     DVT Prophylaxis: Enoxaparin (Lovenox) SQ  Code Status: Full Code    Disposition: Expected discharge in several days once oxygen <=4L/nc.    Sue Rea, CNP    Interval History   Still denying dyspnea, minimal cough, no chest pain. No abdominal pain or nausea.      -Data reviewed today: I reviewed all new labs and imaging results over the last 24 hours.     Physical Exam   Temp: 97.8  F (36.6  C) Temp src: Tympanic BP: 104/66 Pulse: 95   Resp: 20 SpO2: (!) 88 % O2 Device: High Flow Nasal Cannula (HFNC) Oxygen Delivery: 8 LPM  Vitals:    12/14/21 1825 12/15/21 0519 12/16/21 0600   Weight: 77.3 kg (170 lb 6.7 oz) 76.1 kg (167 lb 12.3 oz) 75.2 kg (165 lb 12.6 oz)     Vital Signs with Ranges  Temp:  [96.6  F (35.9  C)-98.3  F (36.8  C)] 97.8  F (36.6  C)  Pulse:  [] 95  Resp:  [18-24] 20  BP: (104-131)/(66-86) 104/66  SpO2:  [85 %-96 %] 88 %  I/O last 3 completed shifts:  In: 120 [P.O.:120]  Out: 400 [Urine:400]    Peripheral IV 12/17/21 Anterior;Left Lower forearm (Active)   Site Assessment WDL  12/17/21 1247   Line Status Saline locked 12/17/21 1247   Dressing Intervention New dressing  12/17/21 1247   Phlebitis Scale 0-->no symptoms 12/17/21 1247   Infiltration Scale 0 12/17/21 1247   Number of days: 0     Line/device assessment(s) completed for medical necessity    Constitutional: Awake,alert, no acute distress  Respiratory: Fine crackles bilateral base, no wheezes, rhonchi. Easy respirations.   Cardiovascular: HRR, no murmurs, rubs, thrills.   GI: Soft,nontender,bowel sounds positive.   Skin/Integumen: No unusual rashes, open areas or bruising noted.       Medications     - MEDICATION INSTRUCTIONS -         albuterol  2 puff Inhalation 4x Daily     aspirin  81 mg Oral Daily     azithromycin  250 mg Intravenous Q24H     cefTRIAXone  2 g Intravenous Q24H     dexamethasone  6 mg Oral Daily     enoxaparin ANTICOAGULANT  40 mg Subcutaneous BID     pantoprazole  40 mg Oral QAM AC     sodium chloride (PF)  3 mL Intravenous Q8H     vancomycin  1,000 mg Intravenous Q12H       Data   Recent Labs   Lab 12/17/21  0550 12/16/21  0541 12/15/21  0538 12/14/21  1849 12/14/21  1454   WBC 9.5 9.6 7.0  --  7.1   HGB 13.1* 12.5* 11.9*  --  12.9*   MCV 95 95 94  --  93    287 281  --  298    139 139  --  138   POTASSIUM 4.9 4.9 4.3   < > 3.4   CHLORIDE 112* 109 109  --  105   CO2 28 27 26  --  27   BUN 21 16 13  --  13   CR 0.81 0.72 0.72  --  0.80   ANIONGAP 2* 3 4  --  6   ANGELA 8.5 8.3* 7.8*  --  7.9*   * 124* 134*  --  128*   ALBUMIN  --   --   --   --  2.2*   PROTTOTAL  --   --   --   --  7.6   BILITOTAL  --   --   --   --  0.5   ALKPHOS  --   --   --   --  84   ALT  --   --   --   --  119*   AST  --   --   --   --  87*    < > = values in this interval not displayed.       No results found for this or any previous visit (from the past 24 hour(s)).

## 2021-12-17 NOTE — PHARMACY-MEDICATION REGIMEN REVIEW
Pharmacy Antimicrobial Stewardship/COVID Assessment     Current Antimicrobial Therapy:  Anti-infectives (From now, onward)    Start     Dose/Rate Route Frequency Ordered Stop    12/15/21 2100  cefTRIAXone IN D5W (ROCEPHIN) intermittent infusion 2 g         2 g  100 mL/hr over 30 Minutes Intravenous EVERY 24 HOURS 21 1830 21 2059    12/15/21 1600  azithromycin (ZITHROMAX) 250 mg in sodium chloride 0.9 % 250 mL intermittent infusion         250 mg  over 1 Hours Intravenous EVERY 24 HOURS 21 1830 21 1559        Indication: COVID: Unvaccinated + CAP     Days of Therapy: Day 1 Vancomycin, Day 4 Rocephin + Zithromax     Pertinent Labs:  Recent Labs   Lab Test 21  0550 21  0541 12/15/21  0538   WBC 9.5 9.6 7.0     Recent Labs   Lab Test 21  0550 21  0541 12/15/21  0538 21  2326 21  1454   LACT  --   --   --  1.8 1.4   CRP 14.2* 22.6* 48.0*  --  67.8*   PCAL  --   --  0.14*  --  0.24*        Temperature:  Temp (24hrs), Av.3  F (36.3  C), Min:96.6  F (35.9  C), Max:98.3  F (36.8  C)    Culture Results:   (12/15/21) Strep pneumo antigen = negative  (12/15/21) Respiratory culture = Staph Aureus    Positive at [facility] 2021 per H&P    Recommendations/Interventions:  1. Add the following per COVID Care Guidelines              -blood culture              -legionella urine antigen    Keshawn Cheng, [facility]  2021

## 2021-12-17 NOTE — PHARMACY-VANCOMYCIN DOSING SERVICE
Pharmacy Vancomycin Initial Note  Date of Service 2021  Patient's  1965  56 year old, male    Indication: Community Acquired Pneumonia    Current estimated CrCl = Estimated Creatinine Clearance: 110.9 mL/min (based on SCr of 0.81 mg/dL).    Creatinine for last 3 days  2021:  2:54 PM Creatinine 0.80 mg/dL  12/15/2021:  5:38 AM Creatinine 0.72 mg/dL  2021:  5:41 AM Creatinine 0.72 mg/dL  2021:  5:50 AM Creatinine 0.81 mg/dL    Recent Vancomycin Level(s) for last 3 days  No results found for requested labs within last 72 hours.      Vancomycin IV Administrations (past 72 hours)      No vancomycin orders with administrations in past 72 hours.                Nephrotoxins and other renal medications (From now, onward)    None          Contrast Orders - past 72 hours (72h ago, onward)            Start     Dose/Rate Route Frequency Ordered Stop    21 1620  iopamidol (ISOVUE-370) solution 60 mL         60 mL Intravenous ONCE 21 1618 21 1619          InsightRX Prediction of Planned Initial Vancomycin Regimen  Loading dose: 1500 mg  Regimen: 1000 mg IV every 12 hours.  Start time: 23:00 on 2021  Exposure target: AUC24 (range)400-600 mg/L.hr   AUC24,ss: 502 mg/L.hr  Probability of AUC24 > 400: 72 %  Ctrough,ss: 15.5 mg/L  Probability of Ctrough,ss > 20: 31 %  Probability of nephrotoxicity (Lodise DANIELE ): 11 %        Plan:  1. Start vancomycin  1500 mg IV x1 dose followed by 1000 mg IV Q12H.   2. Vancomycin monitoring method: AUC  3. Vancomycin therapeutic monitoring goal: 400-600 mg*h/L  4. Pharmacy will check vancomycin levels as appropriate in 1-3 Days.    5. Serum creatinine levels will be ordered daily for the first week of therapy and at least twice weekly for subsequent weeks.      Keshawn Cheng, [facility]

## 2021-12-18 LAB
ANION GAP SERPL CALCULATED.3IONS-SCNC: 3 MMOL/L (ref 3–14)
BACTERIA SPT CULT: ABNORMAL
BUN SERPL-MCNC: 23 MG/DL (ref 7–30)
CALCIUM SERPL-MCNC: 8.3 MG/DL (ref 8.5–10.1)
CHLORIDE BLD-SCNC: 110 MMOL/L (ref 94–109)
CO2 SERPL-SCNC: 27 MMOL/L (ref 20–32)
CREAT SERPL-MCNC: 0.86 MG/DL (ref 0.66–1.25)
CRP SERPL-MCNC: 8.5 MG/L (ref 0–8)
D DIMER PPP FEU-MCNC: 5.42 UG/ML FEU (ref 0–0.5)
ERYTHROCYTE [DISTWIDTH] IN BLOOD BY AUTOMATED COUNT: 13 % (ref 10–15)
GFR SERPL CREATININE-BSD FRML MDRD: >90 ML/MIN/1.73M2
GLUCOSE BLD-MCNC: 101 MG/DL (ref 70–99)
GRAM STAIN RESULT: ABNORMAL
HCT VFR BLD AUTO: 40 % (ref 40–53)
HGB BLD-MCNC: 12.7 G/DL (ref 13.3–17.7)
MCH RBC QN AUTO: 30.6 PG (ref 26.5–33)
MCHC RBC AUTO-ENTMCNC: 31.8 G/DL (ref 31.5–36.5)
MCV RBC AUTO: 96 FL (ref 78–100)
PLATELET # BLD AUTO: 310 10E3/UL (ref 150–450)
POTASSIUM BLD-SCNC: 4.6 MMOL/L (ref 3.4–5.3)
RBC # BLD AUTO: 4.15 10E6/UL (ref 4.4–5.9)
SODIUM SERPL-SCNC: 140 MMOL/L (ref 133–144)
WBC # BLD AUTO: 8.9 10E3/UL (ref 4–11)

## 2021-12-18 PROCEDURE — 250N000012 HC RX MED GY IP 250 OP 636 PS 637: Performed by: HOSPITALIST

## 2021-12-18 PROCEDURE — 36415 COLL VENOUS BLD VENIPUNCTURE: CPT | Performed by: NURSE PRACTITIONER

## 2021-12-18 PROCEDURE — 250N000011 HC RX IP 250 OP 636: Performed by: HOSPITALIST

## 2021-12-18 PROCEDURE — 999N000157 HC STATISTIC RCP TIME EA 10 MIN

## 2021-12-18 PROCEDURE — 80048 BASIC METABOLIC PNL TOTAL CA: CPT | Performed by: NURSE PRACTITIONER

## 2021-12-18 PROCEDURE — 120N000001 HC R&B MED SURG/OB

## 2021-12-18 PROCEDURE — 99232 SBSQ HOSP IP/OBS MODERATE 35: CPT | Performed by: INTERNAL MEDICINE

## 2021-12-18 PROCEDURE — 250N000013 HC RX MED GY IP 250 OP 250 PS 637: Performed by: NURSE PRACTITIONER

## 2021-12-18 PROCEDURE — 85027 COMPLETE CBC AUTOMATED: CPT | Performed by: HOSPITALIST

## 2021-12-18 PROCEDURE — 258N000003 HC RX IP 258 OP 636: Performed by: HOSPITALIST

## 2021-12-18 PROCEDURE — 85379 FIBRIN DEGRADATION QUANT: CPT | Performed by: NURSE PRACTITIONER

## 2021-12-18 PROCEDURE — 250N000013 HC RX MED GY IP 250 OP 250 PS 637: Performed by: HOSPITALIST

## 2021-12-18 PROCEDURE — 86140 C-REACTIVE PROTEIN: CPT | Performed by: HOSPITALIST

## 2021-12-18 PROCEDURE — 250N000011 HC RX IP 250 OP 636: Performed by: NURSE PRACTITIONER

## 2021-12-18 RX ADMIN — VANCOMYCIN HYDROCHLORIDE 1000 MG: 1 INJECTION, SOLUTION INTRAVENOUS at 00:05

## 2021-12-18 RX ADMIN — ASPIRIN 81 MG: 81 TABLET, COATED ORAL at 09:41

## 2021-12-18 RX ADMIN — AZITHROMYCIN MONOHYDRATE 250 MG: 500 INJECTION, POWDER, LYOPHILIZED, FOR SOLUTION INTRAVENOUS at 16:18

## 2021-12-18 RX ADMIN — CEFTRIAXONE SODIUM 2 G: 2 INJECTION, SOLUTION INTRAVENOUS at 20:46

## 2021-12-18 RX ADMIN — ALBUTEROL SULFATE 2 PUFF: 90 AEROSOL, METERED RESPIRATORY (INHALATION) at 09:45

## 2021-12-18 RX ADMIN — ALBUTEROL SULFATE 2 PUFF: 90 AEROSOL, METERED RESPIRATORY (INHALATION) at 16:18

## 2021-12-18 RX ADMIN — VANCOMYCIN HYDROCHLORIDE 1000 MG: 1 INJECTION, SOLUTION INTRAVENOUS at 14:07

## 2021-12-18 RX ADMIN — PANTOPRAZOLE SODIUM 40 MG: 40 TABLET, DELAYED RELEASE ORAL at 09:41

## 2021-12-18 RX ADMIN — ALBUTEROL SULFATE 2 PUFF: 90 AEROSOL, METERED RESPIRATORY (INHALATION) at 14:03

## 2021-12-18 RX ADMIN — ALBUTEROL SULFATE 2 PUFF: 90 AEROSOL, METERED RESPIRATORY (INHALATION) at 20:45

## 2021-12-18 RX ADMIN — DEXAMETHASONE 6 MG: 2 TABLET ORAL at 09:41

## 2021-12-18 RX ADMIN — ENOXAPARIN SODIUM 40 MG: 40 INJECTION SUBCUTANEOUS at 09:41

## 2021-12-18 RX ADMIN — ENOXAPARIN SODIUM 40 MG: 40 INJECTION SUBCUTANEOUS at 20:46

## 2021-12-18 ASSESSMENT — ACTIVITIES OF DAILY LIVING (ADL)
ADLS_ACUITY_SCORE: 5

## 2021-12-18 ASSESSMENT — MIFFLIN-ST. JEOR: SCORE: 1598.77

## 2021-12-18 NOTE — PHARMACY
[facility]    Pharmacy      Antimicrobial Stewardship Note     Current antimicrobial therapy:  Anti-infectives (From now, onward)    Start     Dose/Rate Route Frequency Ordered Stop    12/18/21 0100  vancomycin (VANCOCIN) 1000 mg in dextrose 5% 200 mL PREMIX         1,000 mg  200 mL/hr over 1 Hours Intravenous EVERY 12 HOURS 12/17/21 1027      12/15/21 2100  cefTRIAXone IN D5W (ROCEPHIN) intermittent infusion 2 g         2 g  100 mL/hr over 30 Minutes Intravenous EVERY 24 HOURS 12/14/21 1830 12/22/21 2059    12/15/21 1600  azithromycin (ZITHROMAX) 250 mg in sodium chloride 0.9 % 250 mL intermittent infusion         250 mg  over 1 Hours Intravenous EVERY 24 HOURS 12/14/21 1830 12/19/21 1559          Indication: CAP    Days of Therapy: 5, 2 (vancomycin)     Pertinent labs:  Creatinine   Creatinine   Date Value Ref Range Status   12/18/2021 0.86 0.66 - 1.25 mg/dL Final   12/17/2021 0.81 0.66 - 1.25 mg/dL Final   12/16/2021 0.72 0.66 - 1.25 mg/dL Final     WBC   WBC   Date Value Ref Range Status   06/06/2018 8.8 4.0 - 11.0 10e9/L Final     WBC Count   Date Value Ref Range Status   12/18/2021 8.9 4.0 - 11.0 10e3/uL Final   12/17/2021 9.5 4.0 - 11.0 10e3/uL Final   12/16/2021 9.6 4.0 - 11.0 10e3/uL Final     Procalcitonin   Procalcitonin   Date Value Ref Range Status   12/15/2021 0.14 (H) <0.05 ng/mL Final     Comment:     Interpretation and Recommendations  <0.05 ng/mL Normal  Very low risk of bacterial infection.  Strongly discourage antibiotics.    0.05-0.24 ng/mL Low risk of systemic infection. Local bacterial infection possible.  Assess other clinical features of infection.  Discourage antibiotics.    0.25-0.49 ng/mL Possible early systemic infection or localized infection  Encourage antibiotics only in correct clinical context.  Consider obtaining blood cultures or other relevant cultures.  Recheck PCT in 6-12 hours to ensure baseline low level.  If repeat PCT is rising, consider early systemic infection  and consider starting antibiotics.    0.50-1.99 ng/mL Moderate risk of systemic infection.  Recommend antibiotics.  Evaluate culture results and clinical features to target antibacterial therapy.  Obtain blood cultures and other relevant cultures if not done.    If empiric antibiotics were started, recheck PCT in:       2 days to guide antibiotic de-escalation.       Discontinue or de-escalate antibiotics when PCT concentration is <80% of      peak or abs PCT <0.5.    If empiric antibiotics were NOT started, recheck PCT in:       6-24 hours to re-evaluate need for antibiotics.     2.00-9.99 g/mL High risk for progression to severe sepsis.  Strongly recommend initiating or continuing antibiotics.  Evaluate culture results and clinical features to target antibacterial therapy.  Obtain blood cultures and other relevant cultures if not done.  Repeat PCT in 2 days to guide antibiotic de-escalation.  Consider de-escalating antibiotics when PCT concentration is <80% or peak or abs PCT < 0.05.    Greater than or equal to 10 ng/mL Very high likelihood of severe sepsis or septic shock.  Strongly recommend initiating or continuing antibiotics.  Evaluate culture results and clinical features to target antibacterial therapy.  Obtain blood cultures and other relevant cultures if not done.  Repeat PCT in 2 days to guide antibiotic de-escalation.  Consider de-escalating antibiotics when PCT concentration is <80% of peak or abs PCT < 1.     12/14/2021 0.24 (H) <0.05 ng/mL Final     Comment:     Interpretation and Recommendations  <0.05 ng/mL Normal  Very low risk of bacterial infection.  Strongly discourage antibiotics.    0.05-0.24 ng/mL Low risk of systemic infection. Local bacterial infection possible.  Assess other clinical features of infection.  Discourage antibiotics.    0.25-0.49 ng/mL Possible early systemic infection or localized infection  Encourage antibiotics only in correct clinical context.  Consider obtaining blood  cultures or other relevant cultures.  Recheck PCT in 6-12 hours to ensure baseline low level.  If repeat PCT is rising, consider early systemic infection and consider starting antibiotics.    0.50-1.99 ng/mL Moderate risk of systemic infection.  Recommend antibiotics.  Evaluate culture results and clinical features to target antibacterial therapy.  Obtain blood cultures and other relevant cultures if not done.    If empiric antibiotics were started, recheck PCT in:       2 days to guide antibiotic de-escalation.       Discontinue or de-escalate antibiotics when PCT concentration is <80% of      peak or abs PCT <0.5.    If empiric antibiotics were NOT started, recheck PCT in:       6-24 hours to re-evaluate need for antibiotics.     2.00-9.99 g/mL High risk for progression to severe sepsis.  Strongly recommend initiating or continuing antibiotics.  Evaluate culture results and clinical features to target antibacterial therapy.  Obtain blood cultures and other relevant cultures if not done.  Repeat PCT in 2 days to guide antibiotic de-escalation.  Consider de-escalating antibiotics when PCT concentration is <80% or peak or abs PCT < 0.05.    Greater than or equal to 10 ng/mL Very high likelihood of severe sepsis or septic shock.  Strongly recommend initiating or continuing antibiotics.  Evaluate culture results and clinical features to target antibacterial therapy.  Obtain blood cultures and other relevant cultures if not done.  Repeat PCT in 2 days to guide antibiotic de-escalation.  Consider de-escalating antibiotics when PCT concentration is <80% of peak or abs PCT < 1.       CRP   CRP Inflammation   Date Value Ref Range Status   12/18/2021 8.5 (H) 0.0 - 8.0 mg/L Final   12/17/2021 14.2 (H) 0.0 - 8.0 mg/L Final   12/16/2021 22.6 (H) 0.0 - 8.0 mg/L Final   06/06/2018 <2.9 0.0 - 8.0 mg/L Final       Culture Results: 12/15/21-COVID positive       Recommendations/Interventions:  1. MSSA identified in respiratory  culture. Recommend discontinuing vancomycin, as Rocephin will cover MSSA.    Selvin Macias, [facility]  December 18, 2021

## 2021-12-18 NOTE — PLAN OF CARE
Ok to place patient on AIR VO at this time, currently patient is on 12 liters high flow and his oxygen sats are 78%, a fresh oximiter probe was placed, patient encouraged to take deep breaths, this writer did inform patient he is needing the AIR VO at present time, RT was called to update.

## 2021-12-18 NOTE — PLAN OF CARE
Patient was having great difficulty maintaining adequate oxygenation, and was winded with activity, it was determined patient did require to be placed on the AIRVO , patient is tolerating the AIRVO well and at present his oxygen sats are 95%, patient stated he is feeling so much better, patient has improved color to his face, and appears more energetic, was able to get washed up today after the airvo was placed, and tolerated activity with out getting severely hypoxic or dyspneic, vitals as charted patient adequately makes his needs known.

## 2021-12-18 NOTE — PLAN OF CARE
Face to face report given with opportunity to observe patient.    Report given to Oliva Coronado RN   12/18/2021  3:02 PM

## 2021-12-18 NOTE — PLAN OF CARE
"Most recent vitals: /76 (BP Location: Left arm)   Pulse 85   Temp (!) 96.7  F (35.9  C) (Tympanic)   Resp 20   Ht 1.778 m (5' 10\")   Wt 75.2 kg (165 lb 12.6 oz)   SpO2 96%   BMI 23.79 kg/m      Alert and oriented. VSS. Afebrile. Denies pain. Lungs have crackles to middle and lower lobes. Remains on 8L of oxygen, sats low to mid 90s. Does desat with movement to 80s but has been recovering in a few minutes. Bowel sounds active. Voiding adequately. IV SL. Zithromax and rocephin infused per order. Good appetite. Standby assist. Calls appropriately.    Face to face report given with opportunity to observe patient.    Report given to Seb Mcgrath RN   12/17/2021  11:25 PM      "

## 2021-12-18 NOTE — PLAN OF CARE
Patient is on 12 liters, we are unable to get his sats above 85% dyspnea with activity, MD was paged to update.

## 2021-12-18 NOTE — PLAN OF CARE
"/78 (BP Location: Right arm, Patient Position: Supine)   Pulse 70   Temp 97.6  F (36.4  C) (Tympanic)   Resp 20   Ht 1.778 m (5' 10\")   Wt 75.2 kg (165 lb 12.6 oz)   SpO2 95%   BMI 23.79 kg/m    Patient A&O, makes needs known, denies pain. Afebrile. LS fine crackles in lower lobes. SATs 95-96% on 8 LPM high flow NC. IV vanco infused per order. IV SL. Patient desat to high 80s when up to use urinal with quick recovery.  Patient desat episode when up to scale. SATs between 87-89%. Oxygen increased to 9 LPM high flow NC and SATs 90  Face to face report given with opportunity to observe patient.    Report given to MICHAEL Pool RN   12/18/2021  6:05 AM   "

## 2021-12-18 NOTE — PROGRESS NOTES
[facility]    Hospitalist Progress Note    Date of Service (when I saw the patient): 12/18/2021    Assessment & Plan       Pneumonia due to 2019 novel coronavirus: Unvaccinated. Symptoms start date: 11/26, Positive Covid test: 11/29. Outside of window for remdesivir. Started on decadron, combivent, lovenox. Needing high flow oxygen. Trend inflammatory markers.       Acute respiratory failure with hypoxia (H):Due to #1. Tolerating HFNC with humidity at 8L/nc at rest, titrate as needed. Add flutter valve  -12/16: Now tolerating 6L at rest, however he is dropping his sats for longer periods with any activity. Suspect he needs high flow, will increase to 8L/HFNC and keep him there overnight.   -12/17: recovery time improved on 8L/nc, continue same overnight  -12/18: O2 requirement increased, now placed on AirVo.  CRP down to 8.5, Ddimer also trending down.  May qualify for Actemra/baricitinib now that he is on Airvo, but CRP is low.      Benign essential HTN: Pressures have been stable to on the low side. He was prescribed lisinopril by his PCP some time ago but never started it.     DVT Prophylaxis: Enoxaparin (Lovenox) SQ  Code Status: Full Code    Disposition: Expected discharge in several days once oxygen <=4L/nc.    Maurice Hoang MD      Interval History   Patient seen in room.  Denies any new symptoms.  Significant desaturation with any activity. No abdominal pain or nausea.      -Data reviewed today: I reviewed all new labs and imaging results over the last 24 hours.     Physical Exam   Temp: 97.3  F (36.3  C) Temp src: Oral BP: 125/77 Pulse: 92   Resp: 20 SpO2: (!) 81 % O2 Device: High Flow Nasal Cannula (HFNC) Oxygen Delivery: 12 LPM  Vitals:    12/16/21 0600 12/17/21 0500 12/18/21 0629   Weight: 75.2 kg (165 lb 12.6 oz) 74.8 kg (164 lb 14.5 oz) 75.3 kg (165 lb 14.4 oz)     Vital Signs with Ranges  Temp:  [96.7  F (35.9  C)-97.6  F (36.4  C)] 97.3  F (36.3  C)  Pulse:  [70-93] 92  Resp:  [20-22]  20  BP: (108-130)/(76-85) 125/77  SpO2:  [78 %-96 %] 81 %  I/O last 3 completed shifts:  In: 240 [P.O.:240]  Out: 1550 [Urine:950; Other:600]    Peripheral IV 12/17/21 Anterior;Left Lower forearm (Active)   Site Assessment WDL 12/18/21 0005   Line Status Infusing 12/18/21 0005   Dressing Intervention New dressing  12/17/21 1247   Phlebitis Scale 0-->no symptoms 12/18/21 0005   Infiltration Scale 0 12/18/21 0005   Number of days: 1     Line/device assessment(s) completed for medical necessity    Constitutional: Awake,alert, no acute distress  Respiratory: Fine crackles bilateral base, no wheezes, rhonchi. Easy respirations.   Cardiovascular: HRR, no murmurs, rubs, thrills.   GI: Soft,nontender,bowel sounds positive.   Skin/Integumen: No unusual rashes, open areas or bruising noted.       Medications     - MEDICATION INSTRUCTIONS -         albuterol  2 puff Inhalation 4x Daily     aspirin  81 mg Oral Daily     azithromycin  250 mg Intravenous Q24H     cefTRIAXone  2 g Intravenous Q24H     dexamethasone  6 mg Oral Daily     enoxaparin ANTICOAGULANT  40 mg Subcutaneous BID     pantoprazole  40 mg Oral QAM AC     sodium chloride (PF)  3 mL Intravenous Q8H     vancomycin  1,000 mg Intravenous Q12H       Data   Recent Labs   Lab 12/18/21  0611 12/17/21  0550 12/16/21  0541 12/14/21  1849 12/14/21  1454   WBC 8.9 9.5 9.6   < > 7.1   HGB 12.7* 13.1* 12.5*   < > 12.9*   MCV 96 95 95   < > 93    342 287   < > 298    142 139   < > 138   POTASSIUM 4.6 4.9 4.9   < > 3.4   CHLORIDE 110* 112* 109   < > 105   CO2 27 28 27   < > 27   BUN 23 21 16   < > 13   CR 0.86 0.81 0.72   < > 0.80   ANIONGAP 3 2* 3   < > 6   ANGELA 8.3* 8.5 8.3*   < > 7.9*   * 111* 124*   < > 128*   ALBUMIN  --   --   --   --  2.2*   PROTTOTAL  --   --   --   --  7.6   BILITOTAL  --   --   --   --  0.5   ALKPHOS  --   --   --   --  84   ALT  --   --   --   --  119*   AST  --   --   --   --  87*    < > = values in this interval not displayed.        No results found for this or any previous visit (from the past 24 hour(s)).

## 2021-12-19 LAB
CRP SERPL-MCNC: 7 MG/L (ref 0–8)
VANCOMYCIN SERPL-MCNC: 14.7 MG/L

## 2021-12-19 PROCEDURE — 999N000157 HC STATISTIC RCP TIME EA 10 MIN

## 2021-12-19 PROCEDURE — 250N000011 HC RX IP 250 OP 636: Performed by: NURSE PRACTITIONER

## 2021-12-19 PROCEDURE — 99233 SBSQ HOSP IP/OBS HIGH 50: CPT | Performed by: INTERNAL MEDICINE

## 2021-12-19 PROCEDURE — 80202 ASSAY OF VANCOMYCIN: CPT | Performed by: INTERNAL MEDICINE

## 2021-12-19 PROCEDURE — 5A0935A ASSISTANCE WITH RESPIRATORY VENTILATION, LESS THAN 24 CONSECUTIVE HOURS, HIGH NASAL FLOW/VELOCITY: ICD-10-PCS | Performed by: INTERNAL MEDICINE

## 2021-12-19 PROCEDURE — 250N000013 HC RX MED GY IP 250 OP 250 PS 637: Performed by: HOSPITALIST

## 2021-12-19 PROCEDURE — 250N000011 HC RX IP 250 OP 636: Performed by: HOSPITALIST

## 2021-12-19 PROCEDURE — 86140 C-REACTIVE PROTEIN: CPT | Performed by: HOSPITALIST

## 2021-12-19 PROCEDURE — 250N000013 HC RX MED GY IP 250 OP 250 PS 637: Performed by: NURSE PRACTITIONER

## 2021-12-19 PROCEDURE — 36415 COLL VENOUS BLD VENIPUNCTURE: CPT | Performed by: HOSPITALIST

## 2021-12-19 PROCEDURE — 250N000012 HC RX MED GY IP 250 OP 636 PS 637: Performed by: HOSPITALIST

## 2021-12-19 PROCEDURE — 36415 COLL VENOUS BLD VENIPUNCTURE: CPT | Performed by: INTERNAL MEDICINE

## 2021-12-19 PROCEDURE — 120N000001 HC R&B MED SURG/OB

## 2021-12-19 RX ADMIN — ALBUTEROL SULFATE 2 PUFF: 90 AEROSOL, METERED RESPIRATORY (INHALATION) at 16:29

## 2021-12-19 RX ADMIN — VANCOMYCIN HYDROCHLORIDE 1000 MG: 1 INJECTION, SOLUTION INTRAVENOUS at 13:55

## 2021-12-19 RX ADMIN — ALBUTEROL SULFATE 2 PUFF: 90 AEROSOL, METERED RESPIRATORY (INHALATION) at 13:52

## 2021-12-19 RX ADMIN — CEFTRIAXONE SODIUM 2 G: 2 INJECTION, SOLUTION INTRAVENOUS at 21:27

## 2021-12-19 RX ADMIN — DEXAMETHASONE 6 MG: 2 TABLET ORAL at 09:32

## 2021-12-19 RX ADMIN — ASPIRIN 81 MG: 81 TABLET, COATED ORAL at 09:32

## 2021-12-19 RX ADMIN — ENOXAPARIN SODIUM 40 MG: 40 INJECTION SUBCUTANEOUS at 09:34

## 2021-12-19 RX ADMIN — ALBUTEROL SULFATE 2 PUFF: 90 AEROSOL, METERED RESPIRATORY (INHALATION) at 09:33

## 2021-12-19 RX ADMIN — VANCOMYCIN HYDROCHLORIDE 1000 MG: 1 INJECTION, SOLUTION INTRAVENOUS at 01:37

## 2021-12-19 RX ADMIN — ALBUTEROL SULFATE 2 PUFF: 90 AEROSOL, METERED RESPIRATORY (INHALATION) at 21:27

## 2021-12-19 RX ADMIN — ENOXAPARIN SODIUM 40 MG: 40 INJECTION SUBCUTANEOUS at 21:27

## 2021-12-19 RX ADMIN — PANTOPRAZOLE SODIUM 40 MG: 40 TABLET, DELAYED RELEASE ORAL at 09:31

## 2021-12-19 ASSESSMENT — ACTIVITIES OF DAILY LIVING (ADL)
ADLS_ACUITY_SCORE: 5

## 2021-12-19 NOTE — PHARMACY-VANCOMYCIN DOSING SERVICE
Pharmacy Vancomycin Note  Date of Service 2021  Patient's  1965   56 year old, male    Indication: Community Acquired Pneumonia  Day of Therapy: 3  Current vancomycin regimen:  1000 mg IV q12h  Current vancomycin monitoring method: AUC  Current vancomycin therapeutic monitoring goal: 400-600 mg*h/L    InsightRX Prediction of Current Vancomycin Regimen  Loading dose: N/A  Regimen: 1000 mg IV every 12 hours.  Start time: 13:37 on 2021  Exposure target: AUC24 (range)400-600 mg/L.hr   AUC24,ss: 466 mg/L.hr  Probability of AUC24 > 400: 79 %  Ctrough,ss: 14.1 mg/L  Probability of Ctrough,ss > 20: 9 %  Probability of nephrotoxicity (Lodise DANIELE ): 9 %      Current estimated CrCl = Estimated Creatinine Clearance: 104.6 mL/min (based on SCr of 0.86 mg/dL).    Creatinine for last 3 days  2021:  5:50 AM Creatinine 0.81 mg/dL  2021:  6:11 AM Creatinine 0.86 mg/dL    Recent Vancomycin Levels (past 3 days)  2021:  9:56 AM Vancomycin 14.7 mg/L    Vancomycin IV Administrations (past 72 hours)                   vancomycin (VANCOCIN) 1000 mg in dextrose 5% 200 mL PREMIX (mg) 1,000 mg New Bag 21 0137     1,000 mg New Bag 21 1407     1,000 mg New Bag  0005    vancomycin 1500 mg in 0.9% NaCl 250 ml intermittent infusion 1,500 mg (mg) 1,500 mg New Bag 21 1056                Nephrotoxins and other renal medications (From now, onward)    Start     Dose/Rate Route Frequency Ordered Stop    21 0100  vancomycin (VANCOCIN) 1000 mg in dextrose 5% 200 mL PREMIX         1,000 mg  200 mL/hr over 1 Hours Intravenous EVERY 12 HOURS 21 1027               Contrast Orders - past 72 hours (72h ago, onward)            None          Interpretation of levels and current regimen:  Vancomycin level is reflective of -600    Has serum creatinine changed greater than 50% in last 72 hours: No    Urine output:  unable to determine    Renal Function:  Stable          Plan:  1. Continue Current Dose  2. Vancomycin monitoring method: AUC  3. Vancomycin therapeutic monitoring goal: 400-600 mg*h/L  4. Pharmacy will check vancomycin levels as appropriate in 1-3 Days.  5. Serum creatinine levels will be ordered daily for the first week of therapy and at least twice weekly for subsequent weeks.    Selvin Macias RP

## 2021-12-19 NOTE — PLAN OF CARE
"Most recent vitals: /72 (BP Location: Right arm)   Pulse 92   Temp 96.9  F (36.1  C) (Tympanic)   Resp 18   Ht 1.778 m (5' 10\")   Wt 75.3 kg (165 lb 14.4 oz)   SpO2 98%   BMI 23.80 kg/m      Alert and oriented, able to make needs known. VSS. Afebrile. Denies pain. Lungs have crackles to middle and lower lobes. Remains on airvo, FiO2 47%, 50L, sats in the low to mid 90s. Does desat with movement to high 80s, low 90s but recovers quickly. Denies SOB or KIRKLAND. Bowel sounds active. Voiding adequately. IV SL. Rocephin and zithromax infused per order. Good appetite. Standby assist.     Face to face report given with opportunity to observe patient.    Report given to Carolina Mcgrath RN   12/18/2021  11:00 PM      "

## 2021-12-19 NOTE — PLAN OF CARE
Face to face report given with opportunity to observe patient.    Report given to Oliva Coronado RN   12/19/2021  3:11 PM

## 2021-12-19 NOTE — PROGRESS NOTES
[facility]    Hospitalist Progress Note    Date of Service (when I saw the patient): 12/19/2021    Assessment & Plan       Pneumonia due to 2019 novel coronavirus: Unvaccinated. Symptoms start date: 11/26, Positive Covid test: 11/29. Outside of window for remdesivir. Started on decadron, combivent, lovenox. Needing high flow oxygen. Trend inflammatory markers.       Acute respiratory failure with hypoxia (H):Due to #1. Tolerating HFNC with humidity at 8L/nc at rest, titrate as needed. Add flutter valve  -12/16: Now tolerating 6L at rest, however he is dropping his sats for longer periods with any activity. Suspect he needs high flow, will increase to 8L/HFNC and keep him there overnight.   -12/17: recovery time improved on 8L/nc, continue same overnight  -12/18: O2 requirement increased, now placed on AirVo.  CRP down to 8.5, Ddimer also trending down.  May qualify for Actemra/baricitinib now that he is on Airvo, but CRP is low.  -12/19: AirVo 40 LPM, 40% FiO2.  CRP at 7.0.  Will request Actemra/baricitinib.  Empiric abx ceftriaxone, azithromycin, vanco due to elevated procalcitonin on admission day 6 now.  Can stop tomorrow with no signs of bacterial infections      Benign essential HTN: Pressures have been stable to on the low side. He was prescribed lisinopril by his PCP some time ago but never started it.     DVT Prophylaxis: Enoxaparin (Lovenox) SQ  Code Status: Full Code    Disposition: Expected discharge in several days once oxygen <=4L/nc.    Maurice Hoang MD      Interval History   Patient seen in room.  Denies any new symptoms, no acute events overnight. Good appetite. Significant desaturation with any activity. No abdominal pain or nausea.      -Data reviewed today: I reviewed all new labs and imaging results over the last 24 hours.     Physical Exam   Temp: 97.1  F (36.2  C) Temp src: Tympanic BP: 108/66 Pulse: 100   Resp: 18 SpO2: (!) 90 % O2 Device: Other (Comments) (airvo) Oxygen  Delivery: 50 LPM  Vitals:    12/16/21 0600 12/17/21 0500 12/18/21 0629   Weight: 75.2 kg (165 lb 12.6 oz) 74.8 kg (164 lb 14.5 oz) 75.3 kg (165 lb 14.4 oz)     Vital Signs with Ranges  Temp:  [96.9  F (36.1  C)-97.7  F (36.5  C)] 97.1  F (36.2  C)  Pulse:  [] 100  Resp:  [16-18] 18  BP: (106-127)/(66-86) 108/66  FiO2 (%):  [40 %-69 %] 50 %  SpO2:  [86 %-98 %] 90 %  I/O last 3 completed shifts:  In: 2471 [P.O.:240; I.V.:2231]  Out: 950 [Urine:950]    Peripheral IV 12/17/21 Anterior;Left Lower forearm (Active)   Site Assessment WDL 12/19/21 0450   Line Status Saline locked 12/19/21 0450   Dressing Intervention New dressing  12/17/21 1247   Phlebitis Scale 0-->no symptoms 12/19/21 0450   Infiltration Scale 0 12/18/21 1300   Infiltration Site Treatment Method  None 12/18/21 1300   Number of days: 2     Line/device assessment(s) completed for medical necessity    Constitutional: Awake, alert, mild distress  Respiratory: Fine crackles bilateral base, no wheezes, rhonchi. Easy respirations.   Cardiovascular: HRR, no murmurs, rubs, thrills.   GI: Soft,nontender,bowel sounds positive.   Skin/Integumen: No unusual rashes, open areas or bruising noted.       Medications     - MEDICATION INSTRUCTIONS -         albuterol  2 puff Inhalation 4x Daily     aspirin  81 mg Oral Daily     cefTRIAXone  2 g Intravenous Q24H     dexamethasone  6 mg Oral Daily     enoxaparin ANTICOAGULANT  40 mg Subcutaneous BID     pantoprazole  40 mg Oral QAM AC     sodium chloride (PF)  3 mL Intravenous Q8H     vancomycin  1,000 mg Intravenous Q12H       Data   Recent Labs   Lab 12/18/21  0611 12/17/21  0550 12/16/21  0541 12/14/21  1849 12/14/21  1454   WBC 8.9 9.5 9.6   < > 7.1   HGB 12.7* 13.1* 12.5*   < > 12.9*   MCV 96 95 95   < > 93    342 287   < > 298    142 139   < > 138   POTASSIUM 4.6 4.9 4.9   < > 3.4   CHLORIDE 110* 112* 109   < > 105   CO2 27 28 27   < > 27   BUN 23 21 16   < > 13   CR 0.86 0.81 0.72   < > 0.80    ANIONGAP 3 2* 3   < > 6   ANGELA 8.3* 8.5 8.3*   < > 7.9*   * 111* 124*   < > 128*   ALBUMIN  --   --   --   --  2.2*   PROTTOTAL  --   --   --   --  7.6   BILITOTAL  --   --   --   --  0.5   ALKPHOS  --   --   --   --  84   ALT  --   --   --   --  119*   AST  --   --   --   --  87*    < > = values in this interval not displayed.       No results found for this or any previous visit (from the past 24 hour(s)).

## 2021-12-19 NOTE — PLAN OF CARE
Patient is on the AIRVO, and was de sating this morning, at present, we do have patient proned and he is tolerating proning well, oxygen sats have been 95% while proned.  patent alert oriented and making his needs known well, vitals as charted, patient able to tolerate going from the bed to commode, patient is not diaphoretic nor is he complaining of shortness of breath, lungs are clear but diminished, there are fine crackles noted to the right lung,   patient compliant with cares, appetite fair, voiding with no difficulty.

## 2021-12-19 NOTE — PLAN OF CARE
"Most recent vitals: /86   Pulse 61   Temp 97.2  F (36.2  C) (Tympanic)   Resp 18   Ht 1.778 m (5' 10\")   Wt 75.3 kg (165 lb 14.4 oz)   SpO2 94%   BMI 23.80 kg/m      Patient is A&O x 4. Pleasant. Able to make needs known. Utilizes call light appropriately.  VSS, afebrile. Assessment and vitals as charted. Denies pain. LS clear, crackles at bases bilaterally. Titrated down O2. Currently on 40%FiO2 with 40 LPM via AIRVO Sp02 92 to mid 90s. Tolerating well. Denies SOB. Voiding using urinal. Has not gotten out of bed this shift. Slept through night. IV is saline locked. Call light in reach, bed in low position.       Comments:  0715: Oxygen needs needed to be increased. RT increased AIRVO. Sats low to mid 80s. Denies SOB however.       Face to face report given with opportunity to observe patient.    Report given to MICHAEL Pool.     Mely Urias RN   12/19/2021  8:01 AM            "

## 2021-12-19 NOTE — PLAN OF CARE
Even with the increase in oxygen with the AIRVO, any time patient moves, his oxygen sats do dip down to 82%, patient doies recover quickly, and at present oxygen sats are 89.

## 2021-12-19 NOTE — PROGRESS NOTES
[facility]  I was called by Dr. Hoang on 12/19/2021 at 10:36 AM to provide input for Tomasz Newman MRN [id number]. The patient is located at [facility]. The nature of this request for a curbside consultation does not permit me to perform a comprehensive review of health care records, patient/family interview, nor an examination of the patient. I obtained limited patient information from the provider on the phone call and a limited chart review.    Based on only the information I was provided today, I make the following recommendations to the treating provider/team for their review and consideration:     55 y/o gentleman, unvaccinated, p/w COVID pneumonia and hypoxic respiratory failure. Symptom onset 11/26, tested positive 11/29. Admitted 12/14 on 5L O2 by nasal cannula. On Dexamethasone. His CRP has normalized but O2 requirement up to HFNC    For this gentleman, with symptoms (and diagnosis) >3 weeks and now normal inflammatory markers, limited benefit from use of Tocilizumab or Baricitinib at this stage. Would recommend against usage. Continue and complete course of Dexamethasone     These recommendations are not intended to take the place of the care team's clinical judgement, which should always be utilized to provide the most appropriate care to meet the unique needs of each patient. The recommendations offered were based on the limited scope of information provided as today's date. Should additional guidance be needed or required a formal consultation with infectious diseases is recommended.

## 2021-12-20 LAB
CREAT SERPL-MCNC: 0.69 MG/DL (ref 0.66–1.25)
CRP SERPL-MCNC: 4.8 MG/L (ref 0–8)
GFR SERPL CREATININE-BSD FRML MDRD: >90 ML/MIN/1.73M2
PLATELET # BLD AUTO: 317 10E3/UL (ref 150–450)

## 2021-12-20 PROCEDURE — 82565 ASSAY OF CREATININE: CPT | Performed by: HOSPITALIST

## 2021-12-20 PROCEDURE — 250N000012 HC RX MED GY IP 250 OP 636 PS 637: Performed by: HOSPITALIST

## 2021-12-20 PROCEDURE — 250N000013 HC RX MED GY IP 250 OP 250 PS 637: Performed by: HOSPITALIST

## 2021-12-20 PROCEDURE — 250N000011 HC RX IP 250 OP 636: Performed by: NURSE PRACTITIONER

## 2021-12-20 PROCEDURE — 999N000157 HC STATISTIC RCP TIME EA 10 MIN

## 2021-12-20 PROCEDURE — 99232 SBSQ HOSP IP/OBS MODERATE 35: CPT | Performed by: INTERNAL MEDICINE

## 2021-12-20 PROCEDURE — 85049 AUTOMATED PLATELET COUNT: CPT | Performed by: HOSPITALIST

## 2021-12-20 PROCEDURE — 86140 C-REACTIVE PROTEIN: CPT | Performed by: HOSPITALIST

## 2021-12-20 PROCEDURE — 120N000001 HC R&B MED SURG/OB

## 2021-12-20 PROCEDURE — 36415 COLL VENOUS BLD VENIPUNCTURE: CPT | Performed by: HOSPITALIST

## 2021-12-20 PROCEDURE — 250N000013 HC RX MED GY IP 250 OP 250 PS 637: Performed by: NURSE PRACTITIONER

## 2021-12-20 RX ADMIN — ASPIRIN 81 MG: 81 TABLET, COATED ORAL at 08:51

## 2021-12-20 RX ADMIN — ENOXAPARIN SODIUM 40 MG: 40 INJECTION SUBCUTANEOUS at 21:18

## 2021-12-20 RX ADMIN — ALBUTEROL SULFATE 2 PUFF: 90 AEROSOL, METERED RESPIRATORY (INHALATION) at 17:31

## 2021-12-20 RX ADMIN — ALBUTEROL SULFATE 2 PUFF: 90 AEROSOL, METERED RESPIRATORY (INHALATION) at 08:52

## 2021-12-20 RX ADMIN — ENOXAPARIN SODIUM 40 MG: 40 INJECTION SUBCUTANEOUS at 08:51

## 2021-12-20 RX ADMIN — PANTOPRAZOLE SODIUM 40 MG: 40 TABLET, DELAYED RELEASE ORAL at 08:51

## 2021-12-20 RX ADMIN — DEXAMETHASONE 6 MG: 2 TABLET ORAL at 08:51

## 2021-12-20 RX ADMIN — ALBUTEROL SULFATE 2 PUFF: 90 AEROSOL, METERED RESPIRATORY (INHALATION) at 14:36

## 2021-12-20 RX ADMIN — ALBUTEROL SULFATE 2 PUFF: 90 AEROSOL, METERED RESPIRATORY (INHALATION) at 21:18

## 2021-12-20 RX ADMIN — VANCOMYCIN HYDROCHLORIDE 1000 MG: 1 INJECTION, SOLUTION INTRAVENOUS at 01:43

## 2021-12-20 ASSESSMENT — ACTIVITIES OF DAILY LIVING (ADL)
ADLS_ACUITY_SCORE: 5

## 2021-12-20 ASSESSMENT — MIFFLIN-ST. JEOR: SCORE: 1600.25

## 2021-12-20 NOTE — PROGRESS NOTES
"CLINICAL NUTRITION SERVICES  -  ASSESSMENT NOTE    Tomasz Newman : LOS    56 yom admitted for pneumonia due to COVID 19. Pt has a hx of HTN. Intake has been adequate. Noted drop in weight from 2 years ago. Weight is down 4lb this admission. Net I/Os at -1.4L. Last BM documented on 12/18/21.    Diet Order: Regular   Intake: 11 meals with % intake     Height: 5' 10\"  Weight: 166 lbs 3.63 oz  Body mass index is 23.85 kg/m .  Weight Status:  Normal BMI  Weight History: First measured weight 77.3 kg (170 lb 6.7 oz)  Wt Readings from Last 10 Encounters:   12/20/21 75.4 kg (166 lb 3.6 oz)   06/06/19 83.9 kg (185 lb)   05/22/19 83.9 kg (185 lb)   04/02/19 81.6 kg (180 lb)     Malnutrition Diagnosis: Patient does not meet two of the criteria necessary for diagnosing malnutrition at this time    NUTRITION RECOMMENDATIONS  - Encourage intake during meal times    MONITORING AND EVALUATION:  RD will monitor intake, weight, labs                   "

## 2021-12-20 NOTE — PHARMACY-MEDICATION REGIMEN REVIEW
"Pharmacy Antimicrobial Stewardship/COVID Assessment     Current Antimicrobial Therapy:  Anti-infectives (From now, onward)    Start     Dose/Rate Route Frequency Ordered Stop    21 0100  vancomycin (VANCOCIN) 1000 mg in dextrose 5% 200 mL PREMIX         1,000 mg  200 mL/hr over 1 Hours Intravenous EVERY 12 HOURS 21 1027 21 0159    12/15/21 2100  cefTRIAXone IN D5W (ROCEPHIN) intermittent infusion 2 g         2 g  100 mL/hr over 30 Minutes Intravenous EVERY 24 HOURS 21 1830 219          ~5 day Zithromax course completed 21~    Indication: COVID: Unvaccinated + CAP     Days of Therapy: Day 4 Vancomycin, Day 7 Rocephin      Pertinent Labs:  Recent Labs   Lab Test 21  0611 21  0550 21  0541   WBC 8.9 9.5 9.6     Recent Labs   Lab Test 21  0521 21  0617 21  0611 21  0550 21  0541 12/15/21  0538 21  2326 21  1454 18  2047   LACT  --   --   --   --   --   --  1.8 1.4  --    CRP 4.8 7.0 8.5* 14.2* 22.6* 48.0*  --  67.8* <2.9   PCAL  --   --   --   --   --  0.14*  --  0.24*  --           Temperature:  Temp (24hrs), Av.2  F (36.2  C), Min:96.6  F (35.9  C), Max:98.2  F (36.8  C)    Culture Results:   (12/15/21) Strep pneumo antigen = negative  (12/15/21) Respiratory culture = MSSA       Recommendations/Interventions:  1. Recheck procalcitonin and stop vancomycin as Sputum growing MSSA  2. Per ID, \"limited benefit from use of Tocilizumab or Baricitinib at this stage. Would recommend against usage.\"  3. D-dimer added to AM labs    Keshawn Cheng, [facility]  2021    "

## 2021-12-20 NOTE — PLAN OF CARE
Face to face report given with opportunity to observe patient.    Report given to MICHAEL Gonsalez RN   12/20/2021  7:12 AM

## 2021-12-20 NOTE — PROGRESS NOTES
[facility]    Hospitalist Progress Note    Date of Service (when I saw the patient): 12/20/2021    Assessment & Plan       Pneumonia due to 2019 novel coronavirus: Unvaccinated. Symptoms start date: 11/26, Positive Covid test: 11/29. Outside of window for remdesivir. Started on decadron, combivent, lovenox. Needing high flow oxygen. Trend inflammatory markers.       Acute respiratory failure with hypoxia (H):Due to #1. Tolerating HFNC with humidity at 8L/nc at rest, titrate as needed. Add flutter valve  -12/16: Now tolerating 6L at rest, however he is dropping his sats for longer periods with any activity. Suspect he needs high flow, will increase to 8L/HFNC and keep him there overnight.   -12/17: recovery time improved on 8L/nc, continue same overnight  -12/18: O2 requirement increased, now placed on AirVo.  CRP down to 8.5, Ddimer also trending down.  May qualify for Actemra/baricitinib now that he is on Airvo, but CRP is low.  -12/19: AirVo 40 LPM, 40% FiO2.  CRP at 7.0.  Will request Actemra/baricitinib.  Empiric abx ceftriaxone, azithromycin, vanco due to elevated procalcitonin on admission day 6 now.  Can stop tomorrow with no signs of bacterial infections  -12/20: AirVo 60 LPM, 60% FiO2.  CRP at 4.8.  Did not qualify for Actemra/baricitinib due mostly to duration of illness.  Stop abx as no evidence of bacterial infection.      Benign essential HTN: Pressures have been stable to on the low side. He was prescribed lisinopril by his PCP some time ago but never started it.     DVT Prophylaxis: Enoxaparin (Lovenox) SQ  Code Status: Full Code    Disposition: Expected discharge in several days once oxygen <=4L/nc.    Maurice Hoang MD      Interval History   Patient seen in room.  Denies any new symptoms, no acute events overnight. Good appetite. Significant desaturation with any activity. No abdominal pain or nausea.      -Data reviewed today: I reviewed all new labs and imaging results over the last  24 hours.     Physical Exam   Temp: (!) 96.6  F (35.9  C) Temp src: Tympanic BP: 113/71 Pulse: 82   Resp: 18 SpO2: (!) 89 % O2 Device: Other (Comments) (airvo) Oxygen Delivery: 60 LPM  Vitals:    12/17/21 0500 12/18/21 0629 12/20/21 0500   Weight: 74.8 kg (164 lb 14.5 oz) 75.3 kg (165 lb 14.4 oz) 75.4 kg (166 lb 3.6 oz)     Vital Signs with Ranges  Temp:  [96.6  F (35.9  C)-98.2  F (36.8  C)] 96.6  F (35.9  C)  Pulse:  [] 82  Resp:  [18-19] 18  BP: (108-119)/(66-79) 113/71  FiO2 (%):  [50 %-71 %] 60 %  SpO2:  [89 %-97 %] 89 %  I/O last 3 completed shifts:  In: 240 [P.O.:240]  Out: 400 [Urine:400]    Peripheral IV 12/17/21 Anterior;Left Lower forearm (Active)   Site Assessment WDL 12/20/21 0014   Line Status Saline locked 12/20/21 0014   Dressing Intervention New dressing  12/17/21 1247   Phlebitis Scale 0-->no symptoms 12/20/21 0014   Infiltration Scale 0 12/20/21 0014   Infiltration Site Treatment Method  None 12/19/21 1100   Number of days: 3     Line/device assessment(s) completed for medical necessity    Constitutional: Awake, alert, no distress  Respiratory: Fine crackles bilateral base, no wheezes, rhonchi. Easy respirations.   Cardiovascular: HRR, no murmurs, rubs, thrills.   GI: Soft,nontender,bowel sounds positive.   Skin/Integumen: No unusual rashes, open areas or bruising noted.       Medications     - MEDICATION INSTRUCTIONS -         albuterol  2 puff Inhalation 4x Daily     aspirin  81 mg Oral Daily     cefTRIAXone  2 g Intravenous Q24H     dexamethasone  6 mg Oral Daily     enoxaparin ANTICOAGULANT  40 mg Subcutaneous BID     pantoprazole  40 mg Oral QAM AC     sodium chloride (PF)  3 mL Intravenous Q8H     vancomycin  1,000 mg Intravenous Q12H       Data   Recent Labs   Lab 12/20/21  0521 12/18/21  0611 12/17/21  0550 12/16/21  0541 12/14/21  1849 12/14/21  1454   WBC  --  8.9 9.5 9.6   < > 7.1   HGB  --  12.7* 13.1* 12.5*   < > 12.9*   MCV  --  96 95 95   < > 93    310 342 287   < > 298    NA  --  140 142 139   < > 138   POTASSIUM  --  4.6 4.9 4.9   < > 3.4   CHLORIDE  --  110* 112* 109   < > 105   CO2  --  27 28 27   < > 27   BUN  --  23 21 16   < > 13   CR 0.69 0.86 0.81 0.72   < > 0.80   ANIONGAP  --  3 2* 3   < > 6   ANGELA  --  8.3* 8.5 8.3*   < > 7.9*   GLC  --  101* 111* 124*   < > 128*   ALBUMIN  --   --   --   --   --  2.2*   PROTTOTAL  --   --   --   --   --  7.6   BILITOTAL  --   --   --   --   --  0.5   ALKPHOS  --   --   --   --   --  84   ALT  --   --   --   --   --  119*   AST  --   --   --   --   --  87*    < > = values in this interval not displayed.       No results found for this or any previous visit (from the past 24 hour(s)).

## 2021-12-20 NOTE — PLAN OF CARE
"Most recent vitals: /74 (BP Location: Right arm)   Pulse 78   Temp 97  F (36.1  C) (Tympanic)   Resp 19   Ht 1.778 m (5' 10\")   Wt 75.3 kg (165 lb 14.4 oz)   SpO2 97%   BMI 23.80 kg/m      Alert and oriented, able to make needs known. VSS. Afebrile. Denies pain. Lungs have crackles to lower lobes. Remains on airvo, FiO2 71%, 60L, sats in the low to mid 90s. Does desat with movement to high 80s, low 90s but recovers quickly. Denies SOB or KIRKLAND. Bowel sounds active. Voiding adequately. IV SL. Rocephin infused per order. Good appetite. Standby assist.     Face to face report given with opportunity to observe patient.    Report given to Valerie Mcgrath RN   12/20/2021  4:40 AM        "

## 2021-12-20 NOTE — PLAN OF CARE
"Blood pressure 113/69, pulse 89, temperature 97.7  F (36.5  C), temperature source Tympanic, resp. rate 18, height 1.778 m (5' 10\"), weight 75.4 kg (166 lb 3.6 oz), SpO2 97 %.    A&O, VSS, afebrile, denies pain, on airvo 60L with FIO2 60% o2 low to mid 90s, does desat w/ activity, lungs clear w/ fine crackles to R [place] base, good appetite, independent in room, free from falls uses call light appropriately, makes needs known    Face to face report given with opportunity to observe patient.    Report given to MICHAEL Nino RN   12/20/2021  3:25 PM      "

## 2021-12-21 LAB
ANION GAP SERPL CALCULATED.3IONS-SCNC: 4 MMOL/L (ref 3–14)
BUN SERPL-MCNC: 25 MG/DL (ref 7–30)
CALCIUM SERPL-MCNC: 8.5 MG/DL (ref 8.5–10.1)
CHLORIDE BLD-SCNC: 106 MMOL/L (ref 94–109)
CO2 SERPL-SCNC: 26 MMOL/L (ref 20–32)
CREAT SERPL-MCNC: 0.77 MG/DL (ref 0.66–1.25)
CRP SERPL-MCNC: 4 MG/L (ref 0–8)
D DIMER PPP FEU-MCNC: 2.87 UG/ML FEU (ref 0–0.5)
ERYTHROCYTE [DISTWIDTH] IN BLOOD BY AUTOMATED COUNT: 12.9 % (ref 10–15)
GFR SERPL CREATININE-BSD FRML MDRD: >90 ML/MIN/1.73M2
GLUCOSE BLD-MCNC: 96 MG/DL (ref 70–99)
HCT VFR BLD AUTO: 40.3 % (ref 40–53)
HGB BLD-MCNC: 13.2 G/DL (ref 13.3–17.7)
MCH RBC QN AUTO: 30.8 PG (ref 26.5–33)
MCHC RBC AUTO-ENTMCNC: 32.8 G/DL (ref 31.5–36.5)
MCV RBC AUTO: 94 FL (ref 78–100)
PLATELET # BLD AUTO: 305 10E3/UL (ref 150–450)
POTASSIUM BLD-SCNC: 4.3 MMOL/L (ref 3.4–5.3)
RBC # BLD AUTO: 4.28 10E6/UL (ref 4.4–5.9)
SODIUM SERPL-SCNC: 136 MMOL/L (ref 133–144)
WBC # BLD AUTO: 9.4 10E3/UL (ref 4–11)

## 2021-12-21 PROCEDURE — 250N000013 HC RX MED GY IP 250 OP 250 PS 637: Performed by: HOSPITALIST

## 2021-12-21 PROCEDURE — 85379 FIBRIN DEGRADATION QUANT: CPT | Performed by: INTERNAL MEDICINE

## 2021-12-21 PROCEDURE — 85027 COMPLETE CBC AUTOMATED: CPT | Performed by: INTERNAL MEDICINE

## 2021-12-21 PROCEDURE — 120N000001 HC R&B MED SURG/OB

## 2021-12-21 PROCEDURE — 86140 C-REACTIVE PROTEIN: CPT | Performed by: INTERNAL MEDICINE

## 2021-12-21 PROCEDURE — 250N000012 HC RX MED GY IP 250 OP 636 PS 637: Performed by: HOSPITALIST

## 2021-12-21 PROCEDURE — 36415 COLL VENOUS BLD VENIPUNCTURE: CPT | Performed by: INTERNAL MEDICINE

## 2021-12-21 PROCEDURE — 80048 BASIC METABOLIC PNL TOTAL CA: CPT | Performed by: INTERNAL MEDICINE

## 2021-12-21 PROCEDURE — 99232 SBSQ HOSP IP/OBS MODERATE 35: CPT | Performed by: INTERNAL MEDICINE

## 2021-12-21 PROCEDURE — 999N000157 HC STATISTIC RCP TIME EA 10 MIN

## 2021-12-21 PROCEDURE — 250N000011 HC RX IP 250 OP 636: Performed by: NURSE PRACTITIONER

## 2021-12-21 PROCEDURE — 250N000013 HC RX MED GY IP 250 OP 250 PS 637: Performed by: NURSE PRACTITIONER

## 2021-12-21 RX ADMIN — ALBUTEROL SULFATE 2 PUFF: 90 AEROSOL, METERED RESPIRATORY (INHALATION) at 08:47

## 2021-12-21 RX ADMIN — ALBUTEROL SULFATE 2 PUFF: 90 AEROSOL, METERED RESPIRATORY (INHALATION) at 16:09

## 2021-12-21 RX ADMIN — ALBUTEROL SULFATE 2 PUFF: 90 AEROSOL, METERED RESPIRATORY (INHALATION) at 12:48

## 2021-12-21 RX ADMIN — DEXAMETHASONE 6 MG: 2 TABLET ORAL at 08:46

## 2021-12-21 RX ADMIN — ENOXAPARIN SODIUM 40 MG: 40 INJECTION SUBCUTANEOUS at 21:39

## 2021-12-21 RX ADMIN — ALBUTEROL SULFATE 2 PUFF: 90 AEROSOL, METERED RESPIRATORY (INHALATION) at 21:39

## 2021-12-21 RX ADMIN — ENOXAPARIN SODIUM 40 MG: 40 INJECTION SUBCUTANEOUS at 08:46

## 2021-12-21 RX ADMIN — ASPIRIN 81 MG: 81 TABLET, COATED ORAL at 08:46

## 2021-12-21 RX ADMIN — PANTOPRAZOLE SODIUM 40 MG: 40 TABLET, DELAYED RELEASE ORAL at 08:46

## 2021-12-21 ASSESSMENT — ACTIVITIES OF DAILY LIVING (ADL)
ADLS_ACUITY_SCORE: 5
ADLS_ACUITY_SCORE: 7
ADLS_ACUITY_SCORE: 5
ADLS_ACUITY_SCORE: 5
ADLS_ACUITY_SCORE: 7
ADLS_ACUITY_SCORE: 5
ADLS_ACUITY_SCORE: 7
ADLS_ACUITY_SCORE: 5
ADLS_ACUITY_SCORE: 5
ADLS_ACUITY_SCORE: 7
ADLS_ACUITY_SCORE: 5
ADLS_ACUITY_SCORE: 7
ADLS_ACUITY_SCORE: 5
ADLS_ACUITY_SCORE: 7
ADLS_ACUITY_SCORE: 5
ADLS_ACUITY_SCORE: 5

## 2021-12-21 NOTE — PLAN OF CARE
"/69 (BP Location: Right arm, Patient Position: Semi-Altman's)   Pulse 98   Temp 96.9  F (36.1  C) (Tympanic)   Resp 20   Ht 1.778 m (5' 10\")   Wt 75.4 kg (166 lb 3.6 oz)   SpO2 92%   BMI 23.85 kg/m       Pt A&O, VSS, afebrile, and denies pain. O2 @ 13 L via HFNC. Lungs sounds diminished w/ crackles in bases. No SOB reported. Bowel sounds audible and hyperactive 1x large loose stool. Rest of assessment as charted. IV patent and SL. Call light within reach and makes needs known.    Face to face report given with opportunity to observe patient.    Report given to Valerie FERRO RN & MICHAEL Naqvi RN   12/21/2021  "

## 2021-12-21 NOTE — PLAN OF CARE
"/72 (BP Location: Right arm, Patient Position: Semi-Altman's)   Pulse 75   Temp 97  F (36.1  C) (Tympanic)   Resp 18   Ht 1.778 m (5' 10\")   Wt 75.4 kg (166 lb 3.6 oz)   SpO2 94%   BMI 23.85 kg/m      Pt A&O, afebrile, VS and assessment as charted, standby assist, free of falls or injury this shift. Denies pain. RT turned him down to 45% FiO2, 50 LPM, satting well on this. LS dim but clear. K 4.3 this AM.     Face to face report given with opportunity to observe patient.    Report given to MICHAEL Beltrán RN   12/21/2021  7:28 AM      "

## 2021-12-21 NOTE — PROGRESS NOTES
[facility]    Hospitalist Progress Note    Date of Service (when I saw the patient): 12/21/2021    Assessment & Plan       Pneumonia due to 2019 novel coronavirus: Unvaccinated. Symptoms start date: 11/26, Positive Covid test: 11/29. Outside of window for remdesivir. Started on decadron, combivent, lovenox. Needing high flow oxygen. Trend inflammatory markers.       Acute respiratory failure with hypoxia (H):Due to #1. Tolerating HFNC with humidity at 8L/nc at rest, titrate as needed. Add flutter valve  -12/16: Now tolerating 6L at rest, however he is dropping his sats for longer periods with any activity. Suspect he needs high flow, will increase to 8L/HFNC and keep him there overnight.   -12/17: recovery time improved on 8L/nc, continue same overnight  -12/18: O2 requirement increased, now placed on AirVo.  CRP down to 8.5, Ddimer also trending down.  May qualify for Actemra/baricitinib now that he is on Airvo, but CRP is low.  -12/19: AirVo 40 LPM, 40% FiO2.  CRP at 7.0.  Will request Actemra/baricitinib.  Empiric abx ceftriaxone, azithromycin, vanco due to elevated procalcitonin on admission day 6 now.  Can stop tomorrow with no signs of bacterial infections  -12/20: AirVo 60 LPM, 60% FiO2.  CRP at 4.8.  Did not qualify for Actemra/baricitinib due mostly to duration of illness.  Stop abx as no evidence of bacterial infection.  -12/21: AirVo 50 LPM, 45% FiO2.  Attempt to wean to HFNC today      Benign essential HTN: Pressures have been stable to on the low side. He was prescribed lisinopril by his PCP some time ago but never started it.     DVT Prophylaxis: Enoxaparin (Lovenox) SQ  Code Status: Full Code    Disposition: Expected discharge in several days once oxygen <=4L/nc.    Maurice Hoang MD      Interval History   Patient seen in room.  Denies any new symptoms, no acute events overnight. Good appetite. Significant desaturation with any activity.      -Data reviewed today: I reviewed all new labs  and imaging results over the last 24 hours.     Physical Exam   Temp: 97.2  F (36.2  C) Temp src: Tympanic BP: 111/64 Pulse: 99   Resp: 16 SpO2: (!) 88 % O2 Device: Other (Comments) (aIRVO) Oxygen Delivery: 50 LPM  Vitals:    12/17/21 0500 12/18/21 0629 12/20/21 0500   Weight: 74.8 kg (164 lb 14.5 oz) 75.3 kg (165 lb 14.4 oz) 75.4 kg (166 lb 3.6 oz)     Vital Signs with Ranges  Temp:  [97  F (36.1  C)-97.7  F (36.5  C)] 97.2  F (36.2  C)  Pulse:  [] 99  Resp:  [16-24] 16  BP: (107-115)/(64-76) 111/64  FiO2 (%):  [45 %-61 %] 61 %  SpO2:  [78 %-99 %] 88 %  I/O last 3 completed shifts:  In: 840 [P.O.:840]  Out: 1500 [Urine:1500]    Peripheral IV 12/17/21 Anterior;Left Lower forearm (Active)   Site Assessment WDL 12/21/21 0008   Line Status Saline locked 12/21/21 0008   Dressing Intervention New dressing  12/17/21 1247   Phlebitis Scale 0-->no symptoms 12/21/21 0008   Infiltration Scale 0 12/21/21 0008   Infiltration Site Treatment Method  None 12/19/21 1100   Number of days: 4     Line/device assessment(s) completed for medical necessity    Constitutional: Awake, alert, no distress  Respiratory: Fine crackles bilateral base, no wheezes, rhonchi. Easy respirations.   Cardiovascular: HRR, no murmurs, rubs, thrills.   GI: Soft,nontender,bowel sounds positive.   Skin/Integumen: No unusual rashes, open areas or bruising noted.       Medications     - MEDICATION INSTRUCTIONS -         albuterol  2 puff Inhalation 4x Daily     aspirin  81 mg Oral Daily     dexamethasone  6 mg Oral Daily     enoxaparin ANTICOAGULANT  40 mg Subcutaneous BID     pantoprazole  40 mg Oral QAM AC     sodium chloride (PF)  3 mL Intravenous Q8H       Data   Recent Labs   Lab 12/21/21  0634 12/20/21  0521 12/18/21  0611 12/17/21  0550 12/14/21  1849 12/14/21  1454   WBC 9.4  --  8.9 9.5   < > 7.1   HGB 13.2*  --  12.7* 13.1*   < > 12.9*   MCV 94  --  96 95   < > 93    317 310 342   < > 298     --  140 142   < > 138   POTASSIUM 4.3   --  4.6 4.9   < > 3.4   CHLORIDE 106  --  110* 112*   < > 105   CO2 26  --  27 28   < > 27   BUN 25  --  23 21   < > 13   CR 0.77 0.69 0.86 0.81   < > 0.80   ANIONGAP 4  --  3 2*   < > 6   ANGELA 8.5  --  8.3* 8.5   < > 7.9*   GLC 96  --  101* 111*   < > 128*   ALBUMIN  --   --   --   --   --  2.2*   PROTTOTAL  --   --   --   --   --  7.6   BILITOTAL  --   --   --   --   --  0.5   ALKPHOS  --   --   --   --   --  84   ALT  --   --   --   --   --  119*   AST  --   --   --   --   --  87*    < > = values in this interval not displayed.       No results found for this or any previous visit (from the past 24 hour(s)).

## 2021-12-21 NOTE — PLAN OF CARE
Pt sat in chair this afternoon. Up stand by assist. Pt continues on airvo 60L 45% FiO2. Pt maintains mid 90s at rest with activity desats to mid 80s but recovers quickly. Less than 5 minutes. Pt cont on decadron and lovenox. Ate good supper. Lungs with fine crackles. Pt makes needs known approp.    Face to face report given with opportunity to observe patient.    Report given to Alexandra Burton RN   12/20/2021  11:11 PM

## 2021-12-22 LAB
ANION GAP SERPL CALCULATED.3IONS-SCNC: 3 MMOL/L (ref 3–14)
BUN SERPL-MCNC: 24 MG/DL (ref 7–30)
CALCIUM SERPL-MCNC: 8.4 MG/DL (ref 8.5–10.1)
CHLORIDE BLD-SCNC: 107 MMOL/L (ref 94–109)
CO2 SERPL-SCNC: 28 MMOL/L (ref 20–32)
CREAT SERPL-MCNC: 0.75 MG/DL (ref 0.66–1.25)
CRP SERPL-MCNC: <2.9 MG/L (ref 0–8)
ERYTHROCYTE [DISTWIDTH] IN BLOOD BY AUTOMATED COUNT: 12.8 % (ref 10–15)
GFR SERPL CREATININE-BSD FRML MDRD: >90 ML/MIN/1.73M2
GLUCOSE BLD-MCNC: 110 MG/DL (ref 70–99)
HCT VFR BLD AUTO: 39.8 % (ref 40–53)
HGB BLD-MCNC: 13.3 G/DL (ref 13.3–17.7)
MCH RBC QN AUTO: 31.4 PG (ref 26.5–33)
MCHC RBC AUTO-ENTMCNC: 33.4 G/DL (ref 31.5–36.5)
MCV RBC AUTO: 94 FL (ref 78–100)
PLATELET # BLD AUTO: 302 10E3/UL (ref 150–450)
POTASSIUM BLD-SCNC: 4.3 MMOL/L (ref 3.4–5.3)
RBC # BLD AUTO: 4.24 10E6/UL (ref 4.4–5.9)
SODIUM SERPL-SCNC: 138 MMOL/L (ref 133–144)
WBC # BLD AUTO: 11 10E3/UL (ref 4–11)

## 2021-12-22 PROCEDURE — 99232 SBSQ HOSP IP/OBS MODERATE 35: CPT | Performed by: INTERNAL MEDICINE

## 2021-12-22 PROCEDURE — 250N000013 HC RX MED GY IP 250 OP 250 PS 637: Performed by: NURSE PRACTITIONER

## 2021-12-22 PROCEDURE — 36415 COLL VENOUS BLD VENIPUNCTURE: CPT | Performed by: INTERNAL MEDICINE

## 2021-12-22 PROCEDURE — 250N000012 HC RX MED GY IP 250 OP 636 PS 637: Performed by: HOSPITALIST

## 2021-12-22 PROCEDURE — 250N000013 HC RX MED GY IP 250 OP 250 PS 637: Performed by: HOSPITALIST

## 2021-12-22 PROCEDURE — 85027 COMPLETE CBC AUTOMATED: CPT | Performed by: INTERNAL MEDICINE

## 2021-12-22 PROCEDURE — 80048 BASIC METABOLIC PNL TOTAL CA: CPT | Performed by: INTERNAL MEDICINE

## 2021-12-22 PROCEDURE — 120N000001 HC R&B MED SURG/OB

## 2021-12-22 PROCEDURE — 86140 C-REACTIVE PROTEIN: CPT | Performed by: INTERNAL MEDICINE

## 2021-12-22 PROCEDURE — 250N000011 HC RX IP 250 OP 636: Performed by: NURSE PRACTITIONER

## 2021-12-22 RX ADMIN — ALBUTEROL SULFATE 2 PUFF: 90 AEROSOL, METERED RESPIRATORY (INHALATION) at 08:46

## 2021-12-22 RX ADMIN — ENOXAPARIN SODIUM 40 MG: 40 INJECTION SUBCUTANEOUS at 21:13

## 2021-12-22 RX ADMIN — ALBUTEROL SULFATE 2 PUFF: 90 AEROSOL, METERED RESPIRATORY (INHALATION) at 21:13

## 2021-12-22 RX ADMIN — PANTOPRAZOLE SODIUM 40 MG: 40 TABLET, DELAYED RELEASE ORAL at 08:46

## 2021-12-22 RX ADMIN — ENOXAPARIN SODIUM 40 MG: 40 INJECTION SUBCUTANEOUS at 08:46

## 2021-12-22 RX ADMIN — DEXAMETHASONE 6 MG: 2 TABLET ORAL at 08:46

## 2021-12-22 RX ADMIN — ALBUTEROL SULFATE 2 PUFF: 90 AEROSOL, METERED RESPIRATORY (INHALATION) at 16:13

## 2021-12-22 RX ADMIN — ALBUTEROL SULFATE 2 PUFF: 90 AEROSOL, METERED RESPIRATORY (INHALATION) at 13:26

## 2021-12-22 RX ADMIN — ASPIRIN 81 MG: 81 TABLET, COATED ORAL at 08:46

## 2021-12-22 ASSESSMENT — ACTIVITIES OF DAILY LIVING (ADL)
ADLS_ACUITY_SCORE: 7

## 2021-12-22 ASSESSMENT — MIFFLIN-ST. JEOR: SCORE: 1584.25

## 2021-12-22 NOTE — PLAN OF CARE
Upon general assessment, patient was sitting up in the bed, this writer did get patient up out of bed and into the chair, patient did ambulate well with no dyspnea and no difficulty with ambulation, however patients oxygen sats did dip down with activity but the sats did come up more quickly than they have in previous days, currently patient is on 8 liters of supplemental oxygen via nasal cannula with sats of 90 to 93% patient, patient is denying pain, is able to make his needs known well,patient does take good deep breaths and stated he is feeling good today.

## 2021-12-22 NOTE — PLAN OF CARE
"/80 (BP Location: Right arm, Patient Position: Supine)   Pulse 58   Temp (!) 96.1  F (35.6  C) (Tympanic)   Resp 20   Ht 1.778 m (5' 10\")   Wt 73.8 kg (162 lb 11.2 oz)   SpO2 93%   BMI 23.34 kg/m      Pt A&O, afebrile, VS and assessment as charted, standby assist, free of falls or injury this shift, using urinal independently. Denies pain. Currently on 7 LPM O2 via HFNC, satting well at rest. When getting up to the standing scale for his weight, desatted into the 80s and even 70s for a short while. Pt took a very long time to recover. LS clear. IV SL.    Face to face report given with opportunity to observe patient.    Report given to MICHAEL Pool RN   12/22/2021  6:59 AM      "

## 2021-12-22 NOTE — PLAN OF CARE
"Reason for hospital stay:  Pneumonia due to COVID.   Living situation PTA: Lives at home by himself.   Most recent vitals: /72 (BP Location: Right arm)   Pulse 85   Temp 97.6  F (36.4  C) (Tympanic)   Resp 20   Ht 1.778 m (5' 10\")   Wt 75.4 kg (166 lb 3.6 oz)   SpO2 94%   BMI 23.85 kg/m      Pain Management:  Pt denies having pain.   LOC:  Alert and orientated to person, place, time, and situation.   Cardiac:  WDL  Respiratory:  STACI and RUL clear and equal bilaterally. RLL and LLL fine crackles bilaterally. He is on high flow O2 at 10 LPM. His sats are in the mid 90's.   GI:  WDL   :  WDL  Skin Issues:  Red blanchable area by his ears.     IVF:  None   ABX:  None     Nutrition: Adequate   Activities: Uses the commode at his bedside.   Ambulation: Independent in his room. He has a steady gait.   Safety:  Call light within reach, bed is in the low position,     Comments: Calm, cooperative, and pleasant.   Face to face report given with opportunity to observe patient.    Report given to MICHAEL Morris.     Yale Argueta RN   12/22/2021  12:02 AM       "

## 2021-12-22 NOTE — PLAN OF CARE
Face to face report given with opportunity to observe patient.    Report given to Elsy Coronado RN   12/22/2021  3:24 PM

## 2021-12-22 NOTE — PLAN OF CARE
Patient is up in the chair, tolerated mobility well, patient did dip down with activity, but did recover slowly, sats are 90% on the 8 liters of supplemental oxygen.

## 2021-12-22 NOTE — PLAN OF CARE
Face to face report given with opportunity to observe patient.    Report given to MICHAEL Araujo RN   12/21/2021  6:30 PM

## 2021-12-22 NOTE — PROGRESS NOTES
[facility]    Hospitalist Progress Note    Date of Service (when I saw the patient): 12/22/2021    Assessment & Plan       Pneumonia due to 2019 novel coronavirus: Unvaccinated. Symptoms start date: 11/26, Positive Covid test: 11/29. Outside of window for remdesivir. Started on decadron, combivent, lovenox. Needing high flow oxygen. Trend inflammatory markers.       Acute respiratory failure with hypoxia (H):Due to #1. Tolerating HFNC with humidity at 8L/nc at rest, titrate as needed. Add flutter valve  -12/16: Now tolerating 6L at rest, however he is dropping his sats for longer periods with any activity. Suspect he needs high flow, will increase to 8L/HFNC and keep him there overnight.   -12/17: recovery time improved on 8L/nc, continue same overnight  -12/18: O2 requirement increased, now placed on AirVo.  CRP down to 8.5, Ddimer also trending down.  May qualify for Actemra/baricitinib now that he is on Airvo, but CRP is low.  -12/19: AirVo 40 LPM, 40% FiO2.  CRP at 7.0.  Will request Actemra/baricitinib.  Empiric abx ceftriaxone, azithromycin, vanco due to elevated procalcitonin on admission day 6 now.  Can stop tomorrow with no signs of bacterial infections  -12/20: AirVo 60 LPM, 60% FiO2.  CRP at 4.8.  Did not qualify for Actemra/baricitinib due mostly to duration of illness.  Stop abx as no evidence of bacterial infection.  -12/21: AirVo 50 LPM, 45% FiO2.  Attempt to wean to HFNC today  -12/22: Doing well on 7L HFNC      Benign essential HTN: Pressures have been stable to on the low side. He was prescribed lisinopril by his PCP some time ago but never started it.     DVT Prophylaxis: Enoxaparin (Lovenox) SQ  Code Status: Full Code    Disposition: Expected discharge in several days once oxygen <=4L/nc.    Maurice Hoang MD      Interval History   Patient seen in room.  Able to wean AirVo, now on HFNC and doing well.  Denies any new symptoms, no acute events overnight. Good appetite.    -Data  reviewed today: I reviewed all new labs and imaging results over the last 24 hours.     Physical Exam   Temp: 97.1  F (36.2  C) Temp src: Tympanic BP: 103/64 Pulse: 87   Resp: 20 SpO2: (!) 90 % O2 Device: High Flow Nasal Cannula (HFNC) Oxygen Delivery: 8 LPM  Vitals:    12/18/21 0629 12/20/21 0500 12/22/21 0416   Weight: 75.3 kg (165 lb 14.4 oz) 75.4 kg (166 lb 3.6 oz) 73.8 kg (162 lb 11.2 oz)     Vital Signs with Ranges  Temp:  [96.1  F (35.6  C)-98.2  F (36.8  C)] 97.1  F (36.2  C)  Pulse:  [] 87  Resp:  [20] 20  BP: (103-118)/(64-80) 103/64  SpO2:  [90 %-98 %] 90 %  I/O last 3 completed shifts:  In: 360 [P.O.:360]  Out: 1575 [Urine:1575]    Peripheral IV 12/17/21 Anterior;Left Lower forearm (Active)   Site Assessment WDL 12/22/21 0032   Line Status Saline locked 12/22/21 0032   Dressing Intervention New dressing  12/17/21 1247   Phlebitis Scale 0-->no symptoms 12/22/21 0032   Infiltration Scale 0 12/22/21 0032   Infiltration Site Treatment Method  None 12/21/21 1559   Number of days: 5     Line/device assessment(s) completed for medical necessity    Constitutional: Awake, alert, no distress  Respiratory: Fine crackles bilateral base, no wheezes, rhonchi. Easy respirations.   Cardiovascular: HRR, no murmurs, rubs, thrills.   GI: Soft,nontender,bowel sounds positive.   Skin/Integumen: No unusual rashes, open areas or bruising noted.       Medications     - MEDICATION INSTRUCTIONS -         albuterol  2 puff Inhalation 4x Daily     aspirin  81 mg Oral Daily     dexamethasone  6 mg Oral Daily     enoxaparin ANTICOAGULANT  40 mg Subcutaneous BID     pantoprazole  40 mg Oral QAM AC     sodium chloride (PF)  3 mL Intravenous Q8H       Data   Recent Labs   Lab 12/22/21  0537 12/21/21  0634 12/20/21  0521 12/18/21  0611   WBC 11.0 9.4  --  8.9   HGB 13.3 13.2*  --  12.7*   MCV 94 94  --  96    305 317 310    136  --  140   POTASSIUM 4.3 4.3  --  4.6   CHLORIDE 107 106  --  110*   CO2 28 26  --  27   BUN  24 25  --  23   CR 0.75 0.77 0.69 0.86   ANIONGAP 3 4  --  3   ANGELA 8.4* 8.5  --  8.3*   * 96  --  101*       No results found for this or any previous visit (from the past 24 hour(s)).

## 2021-12-23 ENCOUNTER — APPOINTMENT (OUTPATIENT)
Dept: PHYSICAL THERAPY | Facility: HOSPITAL | Age: 56
End: 2021-12-23
Attending: INTERNAL MEDICINE
Payer: COMMERCIAL

## 2021-12-23 LAB
ANION GAP SERPL CALCULATED.3IONS-SCNC: 3 MMOL/L (ref 3–14)
BUN SERPL-MCNC: 25 MG/DL (ref 7–30)
CALCIUM SERPL-MCNC: 8.7 MG/DL (ref 8.5–10.1)
CHLORIDE BLD-SCNC: 105 MMOL/L (ref 94–109)
CO2 SERPL-SCNC: 30 MMOL/L (ref 20–32)
CREAT SERPL-MCNC: 0.79 MG/DL (ref 0.66–1.25)
CRP SERPL-MCNC: <2.9 MG/L (ref 0–8)
ERYTHROCYTE [DISTWIDTH] IN BLOOD BY AUTOMATED COUNT: 13.2 % (ref 10–15)
GFR SERPL CREATININE-BSD FRML MDRD: >90 ML/MIN/1.73M2
GLUCOSE BLD-MCNC: 105 MG/DL (ref 70–99)
HCT VFR BLD AUTO: 41 % (ref 40–53)
HGB BLD-MCNC: 13.6 G/DL (ref 13.3–17.7)
MCH RBC QN AUTO: 31.2 PG (ref 26.5–33)
MCHC RBC AUTO-ENTMCNC: 33.2 G/DL (ref 31.5–36.5)
MCV RBC AUTO: 94 FL (ref 78–100)
PLATELET # BLD AUTO: 297 10E3/UL (ref 150–450)
POTASSIUM BLD-SCNC: 4.5 MMOL/L (ref 3.4–5.3)
RBC # BLD AUTO: 4.36 10E6/UL (ref 4.4–5.9)
SODIUM SERPL-SCNC: 138 MMOL/L (ref 133–144)
WBC # BLD AUTO: 11.1 10E3/UL (ref 4–11)

## 2021-12-23 PROCEDURE — 97162 PT EVAL MOD COMPLEX 30 MIN: CPT | Mod: GP | Performed by: PHYSICAL THERAPIST

## 2021-12-23 PROCEDURE — 250N000011 HC RX IP 250 OP 636: Performed by: NURSE PRACTITIONER

## 2021-12-23 PROCEDURE — 85027 COMPLETE CBC AUTOMATED: CPT | Performed by: INTERNAL MEDICINE

## 2021-12-23 PROCEDURE — 99232 SBSQ HOSP IP/OBS MODERATE 35: CPT | Performed by: INTERNAL MEDICINE

## 2021-12-23 PROCEDURE — 250N000012 HC RX MED GY IP 250 OP 636 PS 637: Performed by: HOSPITALIST

## 2021-12-23 PROCEDURE — 97530 THERAPEUTIC ACTIVITIES: CPT | Mod: GP | Performed by: PHYSICAL THERAPIST

## 2021-12-23 PROCEDURE — 86140 C-REACTIVE PROTEIN: CPT | Performed by: INTERNAL MEDICINE

## 2021-12-23 PROCEDURE — 250N000013 HC RX MED GY IP 250 OP 250 PS 637: Performed by: HOSPITALIST

## 2021-12-23 PROCEDURE — 80048 BASIC METABOLIC PNL TOTAL CA: CPT | Performed by: INTERNAL MEDICINE

## 2021-12-23 PROCEDURE — 36415 COLL VENOUS BLD VENIPUNCTURE: CPT | Performed by: INTERNAL MEDICINE

## 2021-12-23 PROCEDURE — 250N000013 HC RX MED GY IP 250 OP 250 PS 637: Performed by: NURSE PRACTITIONER

## 2021-12-23 PROCEDURE — 120N000001 HC R&B MED SURG/OB

## 2021-12-23 RX ADMIN — ALBUTEROL SULFATE 2 PUFF: 90 AEROSOL, METERED RESPIRATORY (INHALATION) at 16:24

## 2021-12-23 RX ADMIN — ENOXAPARIN SODIUM 40 MG: 40 INJECTION SUBCUTANEOUS at 21:16

## 2021-12-23 RX ADMIN — DEXAMETHASONE 6 MG: 2 TABLET ORAL at 09:23

## 2021-12-23 RX ADMIN — ASPIRIN 81 MG: 81 TABLET, COATED ORAL at 09:23

## 2021-12-23 RX ADMIN — ALBUTEROL SULFATE 2 PUFF: 90 AEROSOL, METERED RESPIRATORY (INHALATION) at 09:25

## 2021-12-23 RX ADMIN — PANTOPRAZOLE SODIUM 40 MG: 40 TABLET, DELAYED RELEASE ORAL at 09:23

## 2021-12-23 RX ADMIN — ALBUTEROL SULFATE 2 PUFF: 90 AEROSOL, METERED RESPIRATORY (INHALATION) at 21:16

## 2021-12-23 RX ADMIN — ENOXAPARIN SODIUM 40 MG: 40 INJECTION SUBCUTANEOUS at 09:24

## 2021-12-23 RX ADMIN — ALBUTEROL SULFATE 2 PUFF: 90 AEROSOL, METERED RESPIRATORY (INHALATION) at 14:26

## 2021-12-23 ASSESSMENT — ACTIVITIES OF DAILY LIVING (ADL)
ADLS_ACUITY_SCORE: 7

## 2021-12-23 ASSESSMENT — MIFFLIN-ST. JEOR: SCORE: 1577.25

## 2021-12-23 NOTE — PROGRESS NOTES
Inpatient Physical Therapy Evaluation    Name: Tomasz Newman MRN# [id number]   Age: 56 year old YOB: 1965     Date of Consultation: 2021  Consultation is requested by:  Dr. Hoang  Specific Consultation Request:  weakness  Primary care provider: Neo Ramirez    General Information:   Onset Date: 2021    History of Current Problem/Admission: Pneumonia due to 2019 novel coronavirus [U07.1, J12.82]    Prior Level of Function: Pt reports he is independent with all ADLs, drives, grocery shops, and ambulates without assistive device prior to admission.   Ambulation: 0 - Independent   Transferrin - Independent   Toiletin - Independent    Bathin - Independent   Dressin - Independent   Eatin - Independent   Communication: 0 - Understands/communicates without difficulty  Swallowin - swallows foods/liquids without difficulty  Cognition: 0 - no cognitive issues reported    Fall history within the last 6 months: No    Current Living Situation: Patient has 3 steps with bilateral rails to enter his home.  Pt has a basement but it has to be accessed from the outside.  Pt's bedroom, bathroom, laundry are on [facility].  Pt lives alone    Current Equipment Used at Home: none     Patient & Family Goals: to go home     Past Medical History:   No past medical history on file.    Past Surgical History:  Past Surgical History:   Procedure Laterality Date     HERNIA REPAIR         Medications:   Current Facility-Administered Medications   Medication     albuterol (PROVENTIL HFA/VENTOLIN HFA) inhaler     aspirin EC tablet 81 mg     dexamethasone (DECADRON) tablet 6 mg     enoxaparin ANTICOAGULANT (LOVENOX) injection 40 mg     lidocaine (LMX4) cream     lidocaine 1 % 0.1-1 mL     Medication instructions: Do NOT use nebulized medications     melatonin tablet 1 mg     ondansetron (ZOFRAN-ODT) ODT tab 4 mg    Or     ondansetron (ZOFRAN) injection 4 mg     pantoprazole (PROTONIX)  EC tablet 40 mg     senna-docusate (SENOKOT-S/PERICOLACE) 8.6-50 MG per tablet 1 tablet    Or     senna-docusate (SENOKOT-S/PERICOLACE) 8.6-50 MG per tablet 2 tablet     sodium chloride (OCEAN) 0.65 % nasal spray 1 spray     sodium chloride (PF) 0.9% PF flush 3 mL     sodium chloride (PF) 0.9% PF flush 3 mL     sodium chloride (PF) 0.9% PF flush 3 mL       Weight Bearing Status: FWB LEs     Cognitive Status Examination:  Orientation: oriented to time, place and person  Level of Consciousness: alert  Follows Commands and Answers Questions: 100% of the time  Personal Safety and Judgement: Intact  Memory: Short-term memory intact and Long-term memory intact  Comments:     Pain:   Currently in pain? No  Pain Location?   Pain Rating:     Physical Therapy Evaluation:   Integumentary/Edema: unremarkable  ROM: LE AROM WFL  Strength: LE strength grossly 4+/5 throughout  Bed Mobility: independent with desaturation to 80%, no dyspnea noted  Transfers: unable to assess due to hypoxia with bed mobility  Gait: NT due to hypoxia with bed mobility and inability to recover  Stairs: NT  Balance: good sitting  Sensory: denies numbness/tingling LEs  Coordination: NT    Physical Therapy Interventions: Balance, Gait Training , Neuro-muscular re-education, Strengthening, Risk Factor Education, Home Programming Guidelines and Progressive Activity/Exercise     Clinical Impressions:  Criteria for Skilled Therapeutic Intervention Met: Yes, treatment indicated  PT Diagnosis: limited tolerance for functional mobility  Influenced by the following impairments: decreased activity tolerance, weakness  Functional limitations due to impairment: inability to carry out ADLs and functional mobility due to limited activity tolerance  Clinical presentation: Evolving/Changing  Clinical presentation rationale: clinical judgement  Clinical Decision making (complexity): Moderate Complexity  Frequency: 6 times/week  Predicted Duration of Therapy Intervention  (days/wks): 5 days  Anticipated Discharge Disposition: TBD pending activity tolerance improvement  Anticipated Equipment Needs at Discharge:   Risks and Benefits of therapy have been explained: Yes  Patient, Family & other staff in agreement with plan of care: Yes  Clinical Impression Comments: PT evaluation completed.  Pt desaturated to 80% on 7L HFNC with sup>sit transfer.  Unable to recover after 10 minutes with work on breathing, coughing, positioning so instructed by nursing to return pt to prone.  At this time discharge recommendations unable to be determined due to limited activity tolerance.  Pt has been getting up to commode independently in room so is safe on his feet just has no activity tolerance.  Will see during acute care stay to progress activity and mobility as tolerated.      Total Eval Time: 10

## 2021-12-23 NOTE — PLAN OF CARE
"/68 (BP Location: Right arm)   Pulse 84   Temp 97  F (36.1  C) (Tympanic)   Resp 18   Ht 1.778 m (5' 10\")   Wt 73.8 kg (162 lb 11.2 oz)   SpO2 98%   BMI 23.34 kg/m  '      Pt is A&O, VSS, and afebrile. SpO2 has been between 89-97% on high flow NC. Started the shift on 8 LPM, titrated down to 6 LPM now. Denies pain and SOB. Lungs are clear, dim, with fine crackles in the bases. SpO2 dips with activity, slow to recover but does recover well with deep breathing. Up in the chair for most of the shift, appetite was good. Stand by assist to the commode, no difficulies with ambulation at this time. Continued with inhalers. Saline locked now. Call light within reach and calls appropriately. Face to face report given with opportunity to observe patient.    Report given to MICHAEL Morris RN   12/22/2021  11:00 PM   "

## 2021-12-23 NOTE — PLAN OF CARE
Patient has denied pain and discomfort today, however he does continue to dip down in his oxygen sats with any activity, patient is on a high flow nasal cannula at 7 liters, patient does prone with no problem when prompted, patient is currently up in the chair, sats are in the lower 90's while sitting upright, patient has had an adequate appetite, uses his call light to alert staff of his needs.

## 2021-12-23 NOTE — PROGRESS NOTES
[facility]    Hospitalist Progress Note    Date of Service (when I saw the patient): 12/23/2021    Assessment & Plan       Pneumonia due to 2019 novel coronavirus: Unvaccinated. Symptoms start date: 11/26, Positive Covid test: 11/29. Outside of window for remdesivir. Started on decadron, combivent, lovenox. Needing high flow oxygen. Trend inflammatory markers.       Acute respiratory failure with hypoxia (H):Due to #1. Tolerating HFNC with humidity at 8L/nc at rest, titrate as needed. Add flutter valve  -12/16: Now tolerating 6L at rest, however he is dropping his sats for longer periods with any activity. Suspect he needs high flow, will increase to 8L/HFNC and keep him there overnight.   -12/17: recovery time improved on 8L/nc, continue same overnight  -12/18: O2 requirement increased, now placed on AirVo.  CRP down to 8.5, Ddimer also trending down.  May qualify for Actemra/baricitinib now that he is on Airvo, but CRP is low.  -12/19: AirVo 40 LPM, 40% FiO2.  CRP at 7.0.  Will request Actemra/baricitinib.  Empiric abx ceftriaxone, azithromycin, vanco due to elevated procalcitonin on admission day 6 now.  Can stop tomorrow with no signs of bacterial infections  -12/20: AirVo 60 LPM, 60% FiO2.  CRP at 4.8.  Did not qualify for Actemra/baricitinib due mostly to duration of illness.  Stop abx as no evidence of bacterial infection.  -12/21: AirVo 50 LPM, 45% FiO2.  Attempt to wean to HFNC today  -12/22: Doing well on 7L HFNC  -12/23: Down to 5L HFNC, but did not do well with activity and PT      Benign essential HTN: Pressures have been stable to on the low side. He was prescribed lisinopril by his PCP some time ago but never started it.     DVT Prophylaxis: Enoxaparin (Lovenox) SQ  Code Status: Full Code    Disposition: Expected discharge in several days once oxygen <=4L/nc.    Maurice Hoang MD      Interval History   Patient seen in room.  Weaned down to 5L HFNC, but did not do well with PT eval,  desaturates with minimal activity into the high 70s/low 80s.  Denies any new symptoms, no acute events overnight. Good appetite.    -Data reviewed today: I reviewed all new labs and imaging results over the last 24 hours.     Physical Exam   Temp: (!) 96.6  F (35.9  C) Temp src: Tympanic BP: 109/73 Pulse: 64   Resp: 18 SpO2: 93 % O2 Device: Nasal cannula Oxygen Delivery: 5 LPM  Vitals:    12/20/21 0500 12/22/21 0416 12/23/21 0515   Weight: 75.4 kg (166 lb 3.6 oz) 73.8 kg (162 lb 11.2 oz) 73.1 kg (161 lb 2.5 oz)     Vital Signs with Ranges  Temp:  [96.2  F (35.7  C)-97.4  F (36.3  C)] 96.6  F (35.9  C)  Pulse:  [64-92] 64  Resp:  [18-20] 18  BP: (103-115)/(65-73) 109/73  SpO2:  [90 %-98 %] 93 %  I/O last 3 completed shifts:  In: 240 [P.O.:240]  Out: 1000 [Urine:1000]    Peripheral IV 12/17/21 Anterior;Left Lower forearm (Active)   Site Assessment WDL 12/23/21 0038   Line Status Saline locked 12/23/21 0038   Dressing Intervention New dressing  12/17/21 1247   Phlebitis Scale 0-->no symptoms 12/23/21 0038   Infiltration Scale 0 12/23/21 0038   Infiltration Site Treatment Method  None 12/22/21 1300   If infiltrated, was a vesicant infusing? No 12/22/21 1300   Number of days: 6     Line/device assessment(s) completed for medical necessity    Constitutional: Awake, alert, no distress  Respiratory: Fine crackles bilateral base, no wheezes, rhonchi. Easy respirations.   Cardiovascular: HRR, no murmurs, rubs, thrills.   GI: Soft,nontender,bowel sounds positive.   Skin/Integumen: No unusual rashes, open areas or bruising noted.       Medications     - MEDICATION INSTRUCTIONS -         albuterol  2 puff Inhalation 4x Daily     aspirin  81 mg Oral Daily     dexamethasone  6 mg Oral Daily     enoxaparin ANTICOAGULANT  40 mg Subcutaneous BID     pantoprazole  40 mg Oral QAM AC     sodium chloride (PF)  3 mL Intravenous Q8H       Data   Recent Labs   Lab 12/23/21  0601 12/22/21  0537 12/21/21  0634   WBC 11.1* 11.0 9.4   HGB 13.6  13.3 13.2*   MCV 94 94 94    302 305    138 136   POTASSIUM 4.5 4.3 4.3   CHLORIDE 105 107 106   CO2 30 28 26   BUN 25 24 25   CR 0.79 0.75 0.77   ANIONGAP 3 3 4   ANGELA 8.7 8.4* 8.5   * 110* 96       No results found for this or any previous visit (from the past 24 hour(s)).

## 2021-12-23 NOTE — PLAN OF CARE
Face to face report given with opportunity to observe patient.    Report given to Elsy Coronado RN   12/23/2021  3:10 PM

## 2021-12-23 NOTE — PROGRESS NOTES
12/23/21 0922   Signing Clinician's Name / Credentials   Signing clinician's name / credentials Marilin Flowers DPT   Quick Adds   Rehab Discipline PT   Therapeutic Activity   Minutes of Treatment 13 minutes   Symptoms Noted During/After Treatment Fatigue   Treatment Detail Time take to don/doff covid PPE.  Pt transferred sup>sit independent on 7L HFNC.  Pt's O2 sats prior to activity 90%.  After transfer decreased to 80%.  Worked on breathing across 4 seconds inhale and exhale, coughing, positioning, without any recovery of O2 saturation after 10 minutes, sats remaining 79-81%.  Notified nursing who requested pt return to prone in bed.  Pt's O2 sats recovered within 5 minutes of proning.  Prior to getting OOB pt instructed in and completed SLR x10 bilaterally with fatgue noted, O2 sats 88-89% with exercises.  Pt left resting prone in bed at end of session.   PT Discharge Planning    PT Discharge Recommendation (DC Rec)   (TBD depending on improvement in activity tolernace)   PT Rationale for DC Rec PT evaluation completed.  Pt desaturated to 80% on 7L HFNC with sup>sit transfer.  Unable to recover after 10 minutes with work on breathing, coughing, positioning so instructed by nursing to return pt to prone.  At this time discharge recommendations unable to be determined due to limited activity tolerance.  Pt has been getting up to commode independently in room so is safe on his feet just has no activity tolerance.  Will see during acute care stay to progress activity and mobility as tolerated.   PT Brief overview of current status  Time take to don/doff covid PPE.  Pt transferred sup>sit independent on 7L HFNC.  Pt's O2 sats prior to activity 90%.  After transfer decreased to 80%.  Worked on breathing across 4 seconds inhale and exhale, coughing, positioning, without any recovery of O2 saturation after 10 minutes, sats remaining 79-81%.  Notified nursing who requested pt return to prone in bed.  Pt's O2 sats  recovered within 5 minutes of proning.  Prior to getting OOB pt instructed in and completed SLR x10 bilaterally with fatgue noted, O2 sats 88-89% with exercises.  Pt left resting prone in bed at end of session.   Additional Documentation   Rehab Comments limited activity tolerance, unable to recover without proning   PT Plan Progress activity as tolerated   Total Session Time   Total Session Time (minutes) 23 minutes  (10 eval, 13 treatment)

## 2021-12-23 NOTE — PLAN OF CARE
"/73 (BP Location: Right arm, Patient Position: Supine)   Pulse 64   Temp (!) 96.6  F (35.9  C) (Tympanic)   Resp 18   Ht 1.778 m (5' 10\")   Wt 73.1 kg (161 lb 2.5 oz)   SpO2 92%   BMI 23.12 kg/m      Pt A&O, afebrile, VS and assessment as charted, standby assist, free of falls or injury this shift, using urinal independently, calls appropriastely. Denies pain. Currently on 5 LPM O2 via NC, low to mid 90s at rest. LS clear but dim to bases. IV SL. Scanner not working in pt room.      Face to face report given with opportunity to observe patient.    Report given to MICHAEL Pool RN   12/23/2021  7:08 AM         "

## 2021-12-24 ENCOUNTER — APPOINTMENT (OUTPATIENT)
Dept: PHYSICAL THERAPY | Facility: HOSPITAL | Age: 56
End: 2021-12-24
Payer: COMMERCIAL

## 2021-12-24 LAB
D DIMER PPP FEU-MCNC: 1.85 UG/ML FEU (ref 0–0.5)
ERYTHROCYTE [DISTWIDTH] IN BLOOD BY AUTOMATED COUNT: 13.2 % (ref 10–15)
HCT VFR BLD AUTO: 40.3 % (ref 40–53)
HGB BLD-MCNC: 13.4 G/DL (ref 13.3–17.7)
MCH RBC QN AUTO: 31 PG (ref 26.5–33)
MCHC RBC AUTO-ENTMCNC: 33.3 G/DL (ref 31.5–36.5)
MCV RBC AUTO: 93 FL (ref 78–100)
PLATELET # BLD AUTO: 302 10E3/UL (ref 150–450)
POTASSIUM BLD-SCNC: 4.4 MMOL/L (ref 3.4–5.3)
RBC # BLD AUTO: 4.32 10E6/UL (ref 4.4–5.9)
WBC # BLD AUTO: 11.1 10E3/UL (ref 4–11)

## 2021-12-24 PROCEDURE — 84132 ASSAY OF SERUM POTASSIUM: CPT | Performed by: INTERNAL MEDICINE

## 2021-12-24 PROCEDURE — 85014 HEMATOCRIT: CPT | Performed by: INTERNAL MEDICINE

## 2021-12-24 PROCEDURE — 250N000013 HC RX MED GY IP 250 OP 250 PS 637: Performed by: HOSPITALIST

## 2021-12-24 PROCEDURE — 250N000013 HC RX MED GY IP 250 OP 250 PS 637: Performed by: NURSE PRACTITIONER

## 2021-12-24 PROCEDURE — 99232 SBSQ HOSP IP/OBS MODERATE 35: CPT | Performed by: INTERNAL MEDICINE

## 2021-12-24 PROCEDURE — 97530 THERAPEUTIC ACTIVITIES: CPT | Mod: GP

## 2021-12-24 PROCEDURE — 85379 FIBRIN DEGRADATION QUANT: CPT | Performed by: INTERNAL MEDICINE

## 2021-12-24 PROCEDURE — 36415 COLL VENOUS BLD VENIPUNCTURE: CPT | Performed by: INTERNAL MEDICINE

## 2021-12-24 PROCEDURE — 250N000012 HC RX MED GY IP 250 OP 636 PS 637: Performed by: HOSPITALIST

## 2021-12-24 PROCEDURE — 250N000011 HC RX IP 250 OP 636: Performed by: NURSE PRACTITIONER

## 2021-12-24 PROCEDURE — 120N000001 HC R&B MED SURG/OB

## 2021-12-24 RX ADMIN — ASPIRIN 81 MG: 81 TABLET, COATED ORAL at 10:17

## 2021-12-24 RX ADMIN — ALBUTEROL SULFATE 2 PUFF: 90 AEROSOL, METERED RESPIRATORY (INHALATION) at 10:19

## 2021-12-24 RX ADMIN — ENOXAPARIN SODIUM 40 MG: 40 INJECTION SUBCUTANEOUS at 10:19

## 2021-12-24 RX ADMIN — PANTOPRAZOLE SODIUM 40 MG: 40 TABLET, DELAYED RELEASE ORAL at 10:17

## 2021-12-24 RX ADMIN — ENOXAPARIN SODIUM 40 MG: 40 INJECTION SUBCUTANEOUS at 21:11

## 2021-12-24 RX ADMIN — ALBUTEROL SULFATE 2 PUFF: 90 AEROSOL, METERED RESPIRATORY (INHALATION) at 17:05

## 2021-12-24 RX ADMIN — DEXAMETHASONE 6 MG: 2 TABLET ORAL at 10:17

## 2021-12-24 RX ADMIN — ALBUTEROL SULFATE 2 PUFF: 90 AEROSOL, METERED RESPIRATORY (INHALATION) at 14:37

## 2021-12-24 RX ADMIN — ALBUTEROL SULFATE 2 PUFF: 90 AEROSOL, METERED RESPIRATORY (INHALATION) at 21:11

## 2021-12-24 ASSESSMENT — ACTIVITIES OF DAILY LIVING (ADL)
ADLS_ACUITY_SCORE: 7

## 2021-12-24 ASSESSMENT — MIFFLIN-ST. JEOR: SCORE: 1573.25

## 2021-12-24 NOTE — PLAN OF CARE
Face to face report given with opportunity to observe patient.    Report given to Elsy Coronado RN   12/24/2021  3:02 PM

## 2021-12-24 NOTE — PROGRESS NOTES
12/24/21 1200   Signing Clinician's Name / Credentials   Signing clinician's name / credentials Richelle Glass PT   Quick Adds   Rehab Discipline PT   Therapeutic Activity   Minutes of Treatment 12 minutes   Symptoms Noted During/After Treatment Fatigue;Significant change in vital signs   Treatment Detail Pt was up in recliner upon arrival on 4 lpm cont 02. P02 sats were 87% at rest. Pt has no SOB or c/o's SOB. Pt worked on deep breathing with focus on inhaling and slowing down breath. P02 sats increase to 88-90% for short time. Pt performed semisupine heelslides, SLR, ankle pumps and hip abduct/adduct x 5-10 with 2-3 mn rest periods inbetween req d/t lowp02 sats. Informed nurse re low p02 sats. Pt was not able to tolerate ambulation this date. He did not wish to work on standing acts either today   PT Discharge Planning    PT Discharge Recommendation (DC Rec) Per plan established by the PT  (to be determined)   PT Rationale for DC Rec low p02 sats, fatigue   PT Brief overview of current status  Pt was up in recliner upon arrival on 4 lpm cont 02. P02 sats were 87% at rest. Pt has no SOB or c/o's SOB. Pt worked on deep breathing with focus on inhaling and slowing down breath. P02 sats increase to 88-90% for short time. Pt performed semisupine heelslides, SLR, ankle pumps and hip abduct/adduct x 5-10 with 2-3 mn rest periods inbetween req d/t lowp02 sats. Informed nurse re low p02 sats. Pt was not able to tolerate ambulation this date. He did not wish to work on standing acts either today   Additional Documentation   Rehab Comments limited activity tolerance, able to recover to 88-90% with 3+ mns of rest    PT Plan Progress activity as tolerated   Total Session Time   Total Session Time (minutes) 15 minutes

## 2021-12-24 NOTE — PLAN OF CARE
Patient was on the 3 liters of supplemental oxygen this morning, and did desat with activity, unfortunately this writer did need to place patient on 4 liters of supplemental oxygen and currently patient is 94% on the 4 liters of supplemental oxygen, patient adequately makes his needs known well, appetite is good, patient denies pain, patient does report feeling well, and verbalizes an understanding that he may be in the hospital over [place], patient pleasant and compliant.

## 2021-12-24 NOTE — PROGRESS NOTES
[facility]    Hospitalist Progress Note      Assessment & Plan   Tomasz Newman is a 56 year old male who was admitted on 12/14/2021.             Pneumonia due to 2019 novel coronavirus: Unvaccinated. Symptoms start date: 11/26, Positive Covid test: 11/29. Outside of window for remdesivir. Started on decadron, combivent, lovenox. Needing high flow oxygen. Trend inflammatory markers.        Acute respiratory failure with hypoxia (H):Due to #1. Tolerating HFNC with humidity at 8L/nc at rest, titrate as needed. Add flutter valve  -12/16: Now tolerating 6L at rest, however he is dropping his sats for longer periods with any activity. Suspect he needs high flow, will increase to 8L/HFNC and keep him there overnight.   -12/17: recovery time improved on 8L/nc, continue same overnight  -12/18: O2 requirement increased, now placed on AirVo.  CRP down to 8.5, Ddimer also trending down.  May qualify for Actemra/baricitinib now that he is on Airvo, but CRP is low.  -12/19: AirVo 40 LPM, 40% FiO2.  CRP at 7.0.  Will request Actemra/baricitinib.  Empiric abx ceftriaxone, azithromycin, vanco due to elevated procalcitonin on admission day 6 now.  Can stop tomorrow with no signs of bacterial infections  -12/20: AirVo 60 LPM, 60% FiO2.  CRP at 4.8.  Did not qualify for Actemra/baricitinib due mostly to duration of illness.  Stop abx as no evidence of bacterial infection.  -12/21: AirVo 50 LPM, 45% FiO2.  Attempt to wean to HFNC today  -12/22: Doing well on 7L HFNC  -12/23: Down to 5L HFNC, but did not do well with activity and PT   -12/24: Patient states feeling pretty well still has a little bit of a harsh cough.  But no obvious dyspnea.  He has had no fevers.  His O2 requirements have been 3 to 4 L.  Has been dropping down somewhat also with exertion.  CRP is been undetectable.  He is close to getting out of here.  We will continue with getting up and ambulating as best as possible.  Once he is stable down to 3 to 4 L  can likely discharge home.          Benign essential HTN: Pressures have been stable to on the low side. He was prescribed lisinopril by his PCP some time ago but never started it.   -12/24: Pressures good 115 systolic      Diet: Combination Diet Regular Diet Adult  Fluids: None    DVT Prophylaxis: Enoxaparin (Lovenox) SQ  Code Status: Full Code    Disposition: Expected discharge in 1-2 days once O2 requirements are consistently 3 to 4 L.    Real Raymundo    Interval History   Patient alert pleasant no distress feeling well no obvious dyspnea.  No major cough sats to drop down when not moving.  But is relatively asymptomatic.  Patient does live by himself.  Will hopefully get for a walk in the next day or so and hopefully be able to discharge in the next 1 to 2 days.    -Data reviewed today: I reviewed all new labs and imaging results over the last 24 hours. I personally reviewed no images or EKG's today.    Physical Exam   Temp: 97.2  F (36.2  C) Temp src: Tympanic BP: 114/79 Pulse: 100   Resp: (!) 187 SpO2: (!) 91 % O2 Device: Nasal cannula Oxygen Delivery: 4 LPM  Vitals:    12/22/21 0416 12/23/21 0515 12/24/21 0531   Weight: 73.8 kg (162 lb 11.2 oz) 73.1 kg (161 lb 2.5 oz) 72.7 kg (160 lb 4.4 oz)     Vital Signs with Ranges  Temp:  [96.5  F (35.8  C)-97.2  F (36.2  C)] 97.2  F (36.2  C)  Pulse:  [] 100  Resp:  [] 187  BP: (106-119)/(69-79) 114/79  SpO2:  [83 %-97 %] 91 %  I/O last 3 completed shifts:  In: 840 [P.O.:840]  Out: 1125 [Urine:1125]    Peripheral IV 12/17/21 Anterior;Left Lower forearm (Active)   Site Assessment WDL 12/24/21 1100   Line Status Saline locked 12/24/21 1100   Dressing Intervention New dressing  12/17/21 1247   Phlebitis Scale 0-->no symptoms 12/24/21 1100   Infiltration Scale 0 12/24/21 1100   Infiltration Site Treatment Method  None 12/24/21 1100   If infiltrated, was a vesicant infusing? No 12/24/21 1100   Number of days: 7     No line/device    Constitutional: Alert and  oriented x3. No distress    HEENT: Normocephalic/atraumatic, PERRL, EOMI, mouth moist, neck supple, no LN.     Cardiovascular: RRR. no Murmur, no  rubs, or gallops.  JVD flat.  Bruits  2+.  Pulses 2+    Respiratory: No wheezes.  Occasional crackle.  Otherwise clear to auscultation bilaterally    Abdomen: Soft, nontender, nondistended, no organomegaly. Bowel sounds present    Extremities: Warm/dry. No edema    Neuro:   Non focal, cranial nerves intact, Moves all extremities.  Medications     - MEDICATION INSTRUCTIONS -         albuterol  2 puff Inhalation 4x Daily     aspirin  81 mg Oral Daily     enoxaparin ANTICOAGULANT  40 mg Subcutaneous BID     pantoprazole  40 mg Oral QAM AC     sodium chloride (PF)  3 mL Intravenous Q8H       Data   Recent Labs   Lab 12/24/21  0520 12/23/21  0601 12/22/21  0537 12/21/21  0634   WBC 11.1* 11.1* 11.0 9.4   HGB 13.4 13.6 13.3 13.2*   MCV 93 94 94 94    297 302 305   NA  --  138 138 136   POTASSIUM 4.4 4.5 4.3 4.3   CHLORIDE  --  105 107 106   CO2  --  30 28 26   BUN  --  25 24 25   CR  --  0.79 0.75 0.77   ANIONGAP  --  3 3 4   ANGELA  --  8.7 8.4* 8.5   GLC  --  105* 110* 96       No results found for this or any previous visit (from the past 24 hour(s)).

## 2021-12-24 NOTE — PLAN OF CARE
"/69 (BP Location: Left arm)   Pulse 78   Temp (!) 96.7  F (35.9  C) (Tympanic)   Resp 18   Ht 1.778 m (5' 10\")   Wt 73.1 kg (161 lb 2.5 oz)   SpO2 95%   BMI 23.12 kg/m        Pt is A&O, VSS, and afebrile. Denies pain. SpO2 has been in 90's. Started the shift on 7 LPM high flow NC. Titrated down throughout the shift and is now on 4 LPM NC with SpO2 at 95%. Offered multiple times to ambulate in room with staff but pt refused due to O2 dropping after attempted walk with PT earlier today. IS and flutter performed and encouraged. Lungs are dim with fine crackles in the bases. Inhaler given - see MAR. Appetite was good, using bedside urinal. Up in the chair for most of the shift. Encouraged pt to prone overnight. Call light within reach and calls appropriately. Face to face report given with opportunity to observe patient.    Report given to MICHAEL Morris RN   12/23/2021  11:00 PM    "

## 2021-12-24 NOTE — PLAN OF CARE
"/73 (BP Location: Right arm, Patient Position: Supine)   Pulse 56   Temp 97  F (36.1  C) (Tympanic)   Resp 18   Ht 1.778 m (5' 10\")   Wt 72.7 kg (160 lb 4.4 oz)   SpO2 95%   BMI 23.00 kg/m      Pt A&O, afebrile, VS and assessment as charted, standby assist, free of falls or injury this shift, using urinal independently, calls appropriastely. Denies pain. Currently on 3 LPM O2 via NC sats stable at rest. LS clear but dim to bases. IV SL. Pt only desatted into high 80s when getting up to get his weight and recovered very quickly as well.     Face to face report given with opportunity to observe patient.    Report given to MICHAEL Pool RN   12/24/2021  7:22 AM      "

## 2021-12-25 ENCOUNTER — APPOINTMENT (OUTPATIENT)
Dept: CT IMAGING | Facility: HOSPITAL | Age: 56
End: 2021-12-25
Attending: INTERNAL MEDICINE
Payer: COMMERCIAL

## 2021-12-25 LAB
CRP SERPL-MCNC: <2.9 MG/L (ref 0–8)
D DIMER PPP FEU-MCNC: 2.24 UG/ML FEU (ref 0–0.5)
ERYTHROCYTE [DISTWIDTH] IN BLOOD BY AUTOMATED COUNT: 13 % (ref 10–15)
HCT VFR BLD AUTO: 40.5 % (ref 40–53)
HGB BLD-MCNC: 13.4 G/DL (ref 13.3–17.7)
MCH RBC QN AUTO: 30.8 PG (ref 26.5–33)
MCHC RBC AUTO-ENTMCNC: 33.1 G/DL (ref 31.5–36.5)
MCV RBC AUTO: 93 FL (ref 78–100)
PLATELET # BLD AUTO: 277 10E3/UL (ref 150–450)
POTASSIUM BLD-SCNC: 4.2 MMOL/L (ref 3.4–5.3)
RBC # BLD AUTO: 4.35 10E6/UL (ref 4.4–5.9)
WBC # BLD AUTO: 11.8 10E3/UL (ref 4–11)

## 2021-12-25 PROCEDURE — 86140 C-REACTIVE PROTEIN: CPT | Performed by: INTERNAL MEDICINE

## 2021-12-25 PROCEDURE — 120N000001 HC R&B MED SURG/OB

## 2021-12-25 PROCEDURE — 99233 SBSQ HOSP IP/OBS HIGH 50: CPT | Performed by: INTERNAL MEDICINE

## 2021-12-25 PROCEDURE — 250N000011 HC RX IP 250 OP 636: Performed by: INTERNAL MEDICINE

## 2021-12-25 PROCEDURE — 84132 ASSAY OF SERUM POTASSIUM: CPT | Performed by: INTERNAL MEDICINE

## 2021-12-25 PROCEDURE — 250N000013 HC RX MED GY IP 250 OP 250 PS 637: Performed by: HOSPITALIST

## 2021-12-25 PROCEDURE — 36415 COLL VENOUS BLD VENIPUNCTURE: CPT | Performed by: INTERNAL MEDICINE

## 2021-12-25 PROCEDURE — 85379 FIBRIN DEGRADATION QUANT: CPT | Performed by: INTERNAL MEDICINE

## 2021-12-25 PROCEDURE — 71275 CT ANGIOGRAPHY CHEST: CPT

## 2021-12-25 PROCEDURE — 250N000011 HC RX IP 250 OP 636: Performed by: NURSE PRACTITIONER

## 2021-12-25 PROCEDURE — 85027 COMPLETE CBC AUTOMATED: CPT | Performed by: INTERNAL MEDICINE

## 2021-12-25 PROCEDURE — 250N000013 HC RX MED GY IP 250 OP 250 PS 637: Performed by: NURSE PRACTITIONER

## 2021-12-25 PROCEDURE — 999N000157 HC STATISTIC RCP TIME EA 10 MIN

## 2021-12-25 RX ORDER — IOPAMIDOL 755 MG/ML
58 INJECTION, SOLUTION INTRAVASCULAR ONCE
Status: COMPLETED | OUTPATIENT
Start: 2021-12-25 | End: 2021-12-25

## 2021-12-25 RX ADMIN — ALBUTEROL SULFATE 2 PUFF: 90 AEROSOL, METERED RESPIRATORY (INHALATION) at 13:13

## 2021-12-25 RX ADMIN — PANTOPRAZOLE SODIUM 40 MG: 40 TABLET, DELAYED RELEASE ORAL at 07:43

## 2021-12-25 RX ADMIN — IOPAMIDOL 58 ML: 755 INJECTION, SOLUTION INTRAVENOUS at 13:50

## 2021-12-25 RX ADMIN — ENOXAPARIN SODIUM 40 MG: 40 INJECTION SUBCUTANEOUS at 07:43

## 2021-12-25 RX ADMIN — ASPIRIN 81 MG: 81 TABLET, COATED ORAL at 07:43

## 2021-12-25 RX ADMIN — ALBUTEROL SULFATE 2 PUFF: 90 AEROSOL, METERED RESPIRATORY (INHALATION) at 21:08

## 2021-12-25 RX ADMIN — ENOXAPARIN SODIUM 40 MG: 40 INJECTION SUBCUTANEOUS at 21:07

## 2021-12-25 RX ADMIN — ALBUTEROL SULFATE 2 PUFF: 90 AEROSOL, METERED RESPIRATORY (INHALATION) at 15:46

## 2021-12-25 RX ADMIN — ALBUTEROL SULFATE 2 PUFF: 90 AEROSOL, METERED RESPIRATORY (INHALATION) at 07:43

## 2021-12-25 ASSESSMENT — ACTIVITIES OF DAILY LIVING (ADL)
ADLS_ACUITY_SCORE: 7

## 2021-12-25 NOTE — PLAN OF CARE
"/70 (BP Location: Right arm, Patient Position: Supine)   Pulse 73   Temp 97.6  F (36.4  C) (Tympanic)   Resp 16   Ht 1.778 m (5' 10\")   Wt 72.7 kg (160 lb 4.4 oz)   SpO2 95%   BMI 23.00 kg/m      Pt A&O, afebrile, VS and assessment as charted, standby assist, free of falls or injury this shift, using urinal independently, calls appropriastely. Denies pain. Currently on 3 LPM O2 via NC, sats low to mid 90s. LS clear. IV SL.     Face to face report given with opportunity to observe patient.    Report given to MICHAEL Beltrán RN   12/25/2021  7:16 AM      "

## 2021-12-25 NOTE — PROGRESS NOTES
Reviewed patient's CT angiogram.  No evidence of pulmonary embolism.  And in fact his infiltrates appeared to be better than on admission.  His O2 sats are improving is down to 8 L at this time he is completely asymptomatic.  Afebrile hemodynamically stable

## 2021-12-25 NOTE — PROGRESS NOTES
Called by nurse because O2 sats 81%.  On arrival patient on 7L HFNC with SPO2 83%.  Increased to 15L HFNC with SPO2 88-89%.  Dr Raymundo informed.

## 2021-12-25 NOTE — PROGRESS NOTES
[facility]    Hospitalist Progress Note      Assessment & Plan   Tomasz Newman is a 56 year old male who was admitted on 12/14/2021.                    Pneumonia due to 2019 novel coronavirus: Unvaccinated. Symptoms start date: 11/26, Positive Covid test: 11/29. Outside of window for remdesivir. Started on decadron, combivent, lovenox. Needing high flow oxygen. Trend inflammatory markers.        Acute respiratory failure with hypoxia (H):Due to #1. Tolerating HFNC with humidity at 8L/nc at rest, titrate as needed. Add flutter valve  -12/16: Now tolerating 6L at rest, however he is dropping his sats for longer periods with any activity. Suspect he needs high flow, will increase to 8L/HFNC and keep him there overnight.   -12/17: recovery time improved on 8L/nc, continue same overnight  -12/18: O2 requirement increased, now placed on AirVo.  CRP down to 8.5, Ddimer also trending down.  May qualify for Actemra/baricitinib now that he is on Airvo, but CRP is low.  -12/19: AirVo 40 LPM, 40% FiO2.  CRP at 7.0.  Will request Actemra/baricitinib.  Empiric abx ceftriaxone, azithromycin, vanco due to elevated procalcitonin on admission day 6 now.  Can stop tomorrow with no signs of bacterial infections  -12/20: AirVo 60 LPM, 60% FiO2.  CRP at 4.8.  Did not qualify for Actemra/baricitinib due mostly to duration of illness.  Stop abx as no evidence of bacterial infection.  -12/21: AirVo 50 LPM, 45% FiO2.  Attempt to wean to HFNC today  -12/22: Doing well on 7L HFNC  -12/23: Down to 5L HFNC, but did not do well with activity and PT   -12/24: Patient states feeling pretty well still has a little bit of a harsh cough.  But no obvious dyspnea.  He has had no fevers.  His O2 requirements have been 3 to 4 L.  Has been dropping down somewhat also with exertion.  CRP is been undetectable.  He is close to getting out of here.  We will continue with getting up and ambulating as best as possible.  Once he is stable down to 3  to 4 L can likely discharge home.  -12/25: Yesterday patient was doing very well he felt well enough that he was very anxious to be possibly discharged home.  Overnight his sats were 3 L 94%.  However this morning after he got up his O2 sats significantly dropped to the point where he was on 13 L high flow nasal cannula.  Even with that at times he would drop below 90%.  Completely asymptomatic.  No chest discomfort no sense of dyspnea.  No nausea or vomiting.  Weakness.  He has been afebrile hemodynamically stable.  No tachycardia at all.  His examination is really unremarkable.  He is got no leg swelling calf tenderness lungs are basically clear without any significant wheezing good air movement.  Cardiac exam is unremarkable also.  Has not really checked any lab work is D-dimer is pending rechecking his CRP.  Both have been quite low.  He has been on Lovenox since admission.  Will reimage the patient I think a CT angiogram is the most appropriate next step to rule pulmonary embolism and get a better look at his lung status.        Benign essential HTN: Pressures have been stable to on the low side. He was prescribed lisinopril by his PCP some time ago but never started it.   -12/24: Pressures good 115 systolic  -12/25: Hemodynamically stable.      Diet: Combination Diet Regular Diet Adult  Fluids: None    DVT Prophylaxis: Enoxaparin (Lovenox) SQ  Code Status: Full Code    Disposition: Expected discharge once O2 requirements are 4 L or less.  Real Raymundo    Interval History   No issues overnight.  Patient in fact even this morning feels asymptomatic.  However upon getting up his O2 sats dropped significantly requiring high flow nasal cannula up to 13 L.  Have been unable to titrate this down.  He is completely asymptomatic and exam is unremarkable.  Vital signs are otherwise stable.  His repeat D-dimer and CRP are pending they both have been normal.  Will proceed next to CT angiogram.    -Data reviewed today:  I reviewed all new labs and imaging results over the last 24 hours. I personally reviewed no images or EKG's today.    Physical Exam   Temp: 97.2  F (36.2  C) Temp src: Tympanic BP: 113/70 Pulse: 78   Resp: 19 SpO2: (!) 90 % O2 Device: High Flow Nasal Cannula (HFNC) Oxygen Delivery: 13 LPM  Vitals:    12/22/21 0416 12/23/21 0515 12/24/21 0531   Weight: 73.8 kg (162 lb 11.2 oz) 73.1 kg (161 lb 2.5 oz) 72.7 kg (160 lb 4.4 oz)     Vital Signs with Ranges  Temp:  [97  F (36.1  C)-97.6  F (36.4  C)] 97.2  F (36.2  C)  Pulse:  [73-90] 78  Resp:  [16-19] 19  BP: (102-114)/(65-71) 113/70  SpO2:  [80 %-96 %] 90 %  I/O last 3 completed shifts:  In: 360 [P.O.:360]  Out: 800 [Urine:800]    Peripheral IV 12/17/21 Anterior;Left Lower forearm (Active)   Site Assessment WDL 12/25/21 0755   Line Status Saline locked 12/25/21 0755   Dressing Intervention New dressing  12/17/21 1247   Phlebitis Scale 0-->no symptoms 12/25/21 0755   Infiltration Scale 0 12/25/21 0755   Infiltration Site Treatment Method  None 12/24/21 1100   If infiltrated, was a vesicant infusing? No 12/24/21 1100   Number of days: 8     No line/device    Constitutional: Alert and oriented x3. No distress    HEENT: Normocephalic/atraumatic, PERRL, EOMI, mouth moist, neck supple, no LN.     Cardiovascular: RRR. no Murmur, no  rubs, or gallops.  JVD no.  Bruits no.  Pulses 2+    Respiratory: Clear to auscultation bilaterally    Abdomen: Soft, nontender, nondistended, no organomegaly. Bowel sounds present    Extremities: Warm/dry. No edema no calf tenderness    Neuro:   Non focal, cranial nerves intact, Moves all extremities.  Medications     - MEDICATION INSTRUCTIONS -         albuterol  2 puff Inhalation 4x Daily     aspirin  81 mg Oral Daily     enoxaparin ANTICOAGULANT  40 mg Subcutaneous BID     pantoprazole  40 mg Oral QAM AC     sodium chloride (PF)  3 mL Intravenous Q8H       Data   Recent Labs   Lab 12/25/21  0552 12/24/21  0520 12/23/21  0601 12/22/21  0537  12/21/21  0634   WBC 11.8* 11.1* 11.1* 11.0 9.4   HGB 13.4 13.4 13.6 13.3 13.2*   MCV 93 93 94 94 94    302 297 302 305   NA  --   --  138 138 136   POTASSIUM 4.2 4.4 4.5 4.3 4.3   CHLORIDE  --   --  105 107 106   CO2  --   --  30 28 26   BUN  --   --  25 24 25   CR  --   --  0.79 0.75 0.77   ANIONGAP  --   --  3 3 4   ANGELA  --   --  8.7 8.4* 8.5   GLC  --   --  105* 110* 96       No results found for this or any previous visit (from the past 24 hour(s)).

## 2021-12-25 NOTE — PLAN OF CARE
"/66 (BP Location: Left arm, Patient Position: Chair)   Pulse 90   Temp 97.6  F (36.4  C) (Tympanic)   Resp 20   Ht 1.778 m (5' 10\")   Wt 72.7 kg (160 lb 4.4 oz)   SpO2 94%   BMI 23.00 kg/m      Pt A&O, VSS, afebrile, and denies pain. O2 @ 10 L via HFNC. Lungs have fine crackles in RLL. No SOB reported. Cough is infrequent, congested, and productive. Rest of assessment as charted. IV patent and SL. Call light within reach and makes needs known.    Face to face report given with opportunity to observe patient.    Report given to MICHAEL Briceño RN   12/25/2021  "

## 2021-12-25 NOTE — PLAN OF CARE
"/65 (BP Location: Right arm, Patient Position: Chair)   Pulse 85   Temp 97  F (36.1  C) (Tympanic)   Resp 16   Ht 1.778 m (5' 10\")   Wt 72.7 kg (160 lb 4.4 oz)   SpO2 96%   BMI 23.00 kg/m        Pt is A&O, VSS, afebrile, and denies pain. SpO2 has been in the 90's on 4 LPM NC for most of the shift. At 96% now on 3 LPM. Drops to 88-89% with any activity, takes several minutes to recover. Lungs are clear with more air movement today. Inhalers and anticoagulants continued, see MAR. IS and flutter done and encouraged. Appetite was good, using bedside commode. Saline locked. Up in the chair for most of the shift. Call light within reach and calls appropriately. Face to face report given with opportunity to observe patient.    Report given to MICHAEL Morris RN   12/24/2021  11:00 PM    "

## 2021-12-26 ENCOUNTER — APPOINTMENT (OUTPATIENT)
Dept: PHYSICAL THERAPY | Facility: HOSPITAL | Age: 56
End: 2021-12-26
Payer: COMMERCIAL

## 2021-12-26 LAB
ALBUMIN SERPL-MCNC: 2.8 G/DL (ref 3.4–5)
ALP SERPL-CCNC: 104 U/L (ref 40–150)
ALT SERPL W P-5'-P-CCNC: 488 U/L (ref 0–70)
ANION GAP SERPL CALCULATED.3IONS-SCNC: 3 MMOL/L (ref 3–14)
AST SERPL W P-5'-P-CCNC: 119 U/L (ref 0–45)
BILIRUB SERPL-MCNC: 0.3 MG/DL (ref 0.2–1.3)
BUN SERPL-MCNC: 28 MG/DL (ref 7–30)
CALCIUM SERPL-MCNC: 8.5 MG/DL (ref 8.5–10.1)
CHLORIDE BLD-SCNC: 107 MMOL/L (ref 94–109)
CO2 SERPL-SCNC: 29 MMOL/L (ref 20–32)
CREAT SERPL-MCNC: 0.86 MG/DL (ref 0.66–1.25)
ERYTHROCYTE [DISTWIDTH] IN BLOOD BY AUTOMATED COUNT: 13.2 % (ref 10–15)
GFR SERPL CREATININE-BSD FRML MDRD: >90 ML/MIN/1.73M2
GLUCOSE BLD-MCNC: 89 MG/DL (ref 70–99)
HCT VFR BLD AUTO: 40.8 % (ref 40–53)
HGB BLD-MCNC: 13.6 G/DL (ref 13.3–17.7)
MCH RBC QN AUTO: 31.4 PG (ref 26.5–33)
MCHC RBC AUTO-ENTMCNC: 33.3 G/DL (ref 31.5–36.5)
MCV RBC AUTO: 94 FL (ref 78–100)
PLATELET # BLD AUTO: 232 10E3/UL (ref 150–450)
POTASSIUM BLD-SCNC: 4.5 MMOL/L (ref 3.4–5.3)
PROT SERPL-MCNC: 6.6 G/DL (ref 6.8–8.8)
RBC # BLD AUTO: 4.33 10E6/UL (ref 4.4–5.9)
SODIUM SERPL-SCNC: 139 MMOL/L (ref 133–144)
WBC # BLD AUTO: 7.3 10E3/UL (ref 4–11)

## 2021-12-26 PROCEDURE — 120N000001 HC R&B MED SURG/OB

## 2021-12-26 PROCEDURE — 250N000011 HC RX IP 250 OP 636: Performed by: NURSE PRACTITIONER

## 2021-12-26 PROCEDURE — 250N000013 HC RX MED GY IP 250 OP 250 PS 637: Performed by: NURSE PRACTITIONER

## 2021-12-26 PROCEDURE — 80053 COMPREHEN METABOLIC PANEL: CPT | Performed by: INTERNAL MEDICINE

## 2021-12-26 PROCEDURE — 99232 SBSQ HOSP IP/OBS MODERATE 35: CPT | Performed by: INTERNAL MEDICINE

## 2021-12-26 PROCEDURE — 36415 COLL VENOUS BLD VENIPUNCTURE: CPT | Performed by: INTERNAL MEDICINE

## 2021-12-26 PROCEDURE — 250N000013 HC RX MED GY IP 250 OP 250 PS 637: Performed by: HOSPITALIST

## 2021-12-26 PROCEDURE — 97530 THERAPEUTIC ACTIVITIES: CPT | Mod: GP

## 2021-12-26 PROCEDURE — 85014 HEMATOCRIT: CPT | Performed by: INTERNAL MEDICINE

## 2021-12-26 RX ADMIN — ASPIRIN 81 MG: 81 TABLET, COATED ORAL at 10:02

## 2021-12-26 RX ADMIN — ALBUTEROL SULFATE 2 PUFF: 90 AEROSOL, METERED RESPIRATORY (INHALATION) at 10:03

## 2021-12-26 RX ADMIN — ALBUTEROL SULFATE 2 PUFF: 90 AEROSOL, METERED RESPIRATORY (INHALATION) at 13:32

## 2021-12-26 RX ADMIN — ENOXAPARIN SODIUM 40 MG: 40 INJECTION SUBCUTANEOUS at 10:02

## 2021-12-26 RX ADMIN — ENOXAPARIN SODIUM 40 MG: 40 INJECTION SUBCUTANEOUS at 20:33

## 2021-12-26 RX ADMIN — ALBUTEROL SULFATE 2 PUFF: 90 AEROSOL, METERED RESPIRATORY (INHALATION) at 16:23

## 2021-12-26 RX ADMIN — PANTOPRAZOLE SODIUM 40 MG: 40 TABLET, DELAYED RELEASE ORAL at 10:02

## 2021-12-26 RX ADMIN — ALBUTEROL SULFATE 2 PUFF: 90 AEROSOL, METERED RESPIRATORY (INHALATION) at 20:33

## 2021-12-26 ASSESSMENT — ACTIVITIES OF DAILY LIVING (ADL)
ADLS_ACUITY_SCORE: 7
ADLS_ACUITY_SCORE: 5
ADLS_ACUITY_SCORE: 7
ADLS_ACUITY_SCORE: 5
ADLS_ACUITY_SCORE: 5
ADLS_ACUITY_SCORE: 7
ADLS_ACUITY_SCORE: 5
ADLS_ACUITY_SCORE: 7
ADLS_ACUITY_SCORE: 7
ADLS_ACUITY_SCORE: 5
ADLS_ACUITY_SCORE: 7
ADLS_ACUITY_SCORE: 5
ADLS_ACUITY_SCORE: 5
ADLS_ACUITY_SCORE: 7
ADLS_ACUITY_SCORE: 7
ADLS_ACUITY_SCORE: 5
ADLS_ACUITY_SCORE: 5
ADLS_ACUITY_SCORE: 7
ADLS_ACUITY_SCORE: 5
ADLS_ACUITY_SCORE: 5
ADLS_ACUITY_SCORE: 7
ADLS_ACUITY_SCORE: 5
ADLS_ACUITY_SCORE: 7
ADLS_ACUITY_SCORE: 5

## 2021-12-26 NOTE — PROGRESS NOTES
[facility]  Hospitalist Progress Note          Assessment and Plan:     Tomasz Newman is a 56 year old male who was admitted on 12/14/2021.             Pneumonia due to 2019 novel coronavirus: Unvaccinated. Symptoms start date: 11/26, Positive Covid test: 11/29. Outside of window for remdesivir. Started on decadron, combivent, lovenox. Needing high flow oxygen. Trend inflammatory markers.        Acute respiratory failure with hypoxia (H):Due to #1. Tolerating HFNC with humidity at 8L/nc at rest, titrate as needed. Add flutter valve  -12/16: Now tolerating 6L at rest, however he is dropping his sats for longer periods with any activity. Suspect he needs high flow, will increase to 8L/HFNC and keep him there overnight.   -12/17: recovery time improved on 8L/nc, continue same overnight  -12/18: O2 requirement increased, now placed on AirVo.  CRP down to 8.5, Ddimer also trending down.  May qualify for Actemra/baricitinib now that he is on Airvo, but CRP is low.  -12/19: AirVo 40 LPM, 40% FiO2.  CRP at 7.0.  Will request Actemra/baricitinib.  Empiric abx ceftriaxone, azithromycin, vanco due to elevated procalcitonin on admission day 6 now.  Can stop tomorrow with no signs of bacterial infections  -12/20: AirVo 60 LPM, 60% FiO2.  CRP at 4.8.  Did not qualify for Actemra/baricitinib due mostly to duration of illness.  Stop abx as no evidence of bacterial infection.  -12/21: AirVo 50 LPM, 45% FiO2.  Attempt to wean to HFNC today  -12/22: Doing well on 7L HFNC  -12/23: Down to 5L HFNC, but did not do well with activity and PT   -12/24: Patient states feeling pretty well still has a little bit of a harsh cough.  But no obvious dyspnea.  He has had no fevers.  His O2 requirements have been 3 to 4 L.  Has been dropping down somewhat also with exertion.  CRP is been undetectable.  He is close to getting out of here.  We will continue with getting up and ambulating as best as possible.  Once he is stable down to  3 to 4 L can likely discharge home.  -12/25: Yesterday patient was doing very well he felt well enough that he was very anxious to be possibly discharged home.  Overnight his sats were 3 L 94%.  However this morning after he got up his O2 sats significantly dropped to the point where he was on 13 L high flow nasal cannula.  Even with that at times he would drop below 90%.  Completely asymptomatic.  No chest discomfort no sense of dyspnea.  No nausea or vomiting.  Weakness.  He has been afebrile hemodynamically stable.  No tachycardia at all.  His examination is really unremarkable.  He is got no leg swelling calf tenderness lungs are basically clear without any significant wheezing good air movement.  Cardiac exam is unremarkable also.  Has not really checked any lab work is D-dimer is pending rechecking his CRP.  Both have been quite low.  He has been on Lovenox since admission.  Will reimage the patient I think a CT angiogram is the most appropriate next step to rule pulmonary embolism and get a better look at his lung status.   -12/26:  CT yesterday did rule out a PE.  This morning on 5L NC but desats dramatically in just sitting up in bed down to the 70s/80s.         Elevated LFTs: 12/26: repeat in am      Benign essential HTN: Pressures have been stable to on the low side. He was prescribed lisinopril by his PCP some time ago but never started it.   -12/24: Pressures good 115 systolic  -12/25: Hemodynamically stable.  -12/26:  Stable          Diet: Combination Diet Regular Diet Adult  Fluids: None     DVT Prophylaxis: Enoxaparin (Lovenox) SQ  Code Status: Full Code     Disposition: Expected discharge once O2 requirements are 4 L or less.       Clinically Significant Risk Factors Present on Admission                          Interval History:     Patient remains on 5 L NC, but desaturates dramatically with any movement.  CT yesterday negative for PE.              Medications:       albuterol  2 puff Inhalation  4x Daily     aspirin  81 mg Oral Daily     enoxaparin ANTICOAGULANT  40 mg Subcutaneous BID     pantoprazole  40 mg Oral QAM AC     sodium chloride (PF)  3 mL Intravenous Q8H                  Physical Exam:     Vitals:    21 0105 21 0447 21 0522 21 0707   BP:  106/67     BP Location:  Right arm     Patient Position:  Supine     Pulse:  74     Resp:  18     Temp:  97.5  F (36.4  C)     TempSrc:  Tympanic     SpO2: 93% 94% 95% (!) 90%   Weight:       Height:             Vital Sign Ranges  Temperature Temp  Av.3  F (36.3  C)  Min: 97.1  F (36.2  C)  Max: 97.6  F (36.4  C)   Blood pressure Systolic (24hrs), Av , Min:99 , Max:113        Diastolic (24hrs), Av, Min:61, Max:67      Pulse Pulse  Av.4  Min: 74  Max: 90   Respirations Resp  Av.8  Min: 18  Max: 20   Pulse oximetry SpO2  Av.9 %  Min: 83 %  Max: 98 %         Intake/Output Summary (Last 24 hours) at 2021 0757  Last data filed at 2021 2100  Gross per 24 hour   Intake 528 ml   Output 1290 ml   Net -762 ml       General:  Alert and Orientated.  NAD.  Heart:  RRR, S1 S2, No murmurs, no rubs, no gallops.  Resp: CTA bilaterally in upper fields diminished in bases.    Abd: Soft/NT/ND/Positve BS.  Ext: No edema noted in either lower extremity  Neuro:  No focal Neurologic deficits noted.    Peripheral IV 21 Anterior;Left Lower forearm (Active)   Site Assessment WDL 21 0045   Line Status Saline locked 21 004   Dressing Intervention New dressing  21 1331   Phlebitis Scale 0-->no symptoms 21   Infiltration Scale 0 21 0045   Infiltration Site Treatment Method  None 21 1100   If infiltrated, was a vesicant infusing? No 21 1100   Number of days: 9       Peripheral IV 21 Left;Posterior Lower forearm (Active)   Site Assessment WDL 12/26/21 0045   Line Status Saline locked 21   Phlebitis Scale 0-->no symptoms 21   Infiltration Scale 0  12/26/21 0045   Number of days: 1     No line/device             Data:     Lab Results   Component Value Date    WBC 7.3 12/26/2021    HGB 13.6 12/26/2021    HCT 40.8 12/26/2021     12/26/2021     12/26/2021    POTASSIUM 4.5 12/26/2021    CHLORIDE 107 12/26/2021    CO2 29 12/26/2021    BUN 28 12/26/2021    CR 0.86 12/26/2021    GLC 89 12/26/2021    DD 2.24 (H) 12/25/2021     (H) 12/26/2021     (H) 12/26/2021    ALKPHOS 104 12/26/2021    BILITOTAL 0.3 12/26/2021     Lactic Acid   Date Value Ref Range Status   12/14/2021 1.8 0.7 - 2.0 mmol/L Final   12/14/2021 1.4 0.7 - 2.0 mmol/L Final       Daniel Monroe DO

## 2021-12-26 NOTE — PLAN OF CARE
"/67 (BP Location: Right arm, Patient Position: Supine)   Pulse 74   Temp 97.5  F (36.4  C) (Tympanic)   Resp 18   Ht 1.778 m (5' 10\")   Wt 72.7 kg (160 lb 4.4 oz)   SpO2 95%   BMI 23.00 kg/m       Pt A&O, afebrile, VS and assessment as charted, independent for short distances in his room, free of falls or injury this shift, using urinal independently, calls appropriately with call light in reach. Denies pain. Currently on 5 LPM O2 via NC, sats stable at rest. Did desat to 88% just sitting up in bed for me to listen to his lungs posteriorly, able to recover well. LS with fine crackles to bases. IV SL.     Face to face report given with opportunity to observe patient.    Report given to MICHAEL Beltrán RN   12/26/2021  6:29 AM      "

## 2021-12-26 NOTE — PLAN OF CARE
"BP 99/59 (BP Location: Right arm, Patient Position: Chair)   Pulse 105   Temp 97  F (36.1  C) (Tympanic)   Resp 20   Ht 1.778 m (5' 10\")   Wt 72.7 kg (160 lb 4.4 oz)   SpO2 93%   BMI 23.00 kg/m      Pt A&O, afebrile, and denies pain. HR occasionally tachycardic. O2 @ 4 L via NC. Desats to mid 80s w/ activity. Lungs have fine crackles in the bases. No SOB reported. Cough is infrequent, congested, and productive. Rest of assessment as charted. IV patent and SL. Call light within reach and makes needs known.    Face to face report given with opportunity to observe patient.    Report given to MICHAEL Briceño RN   12/26/2021    "

## 2021-12-26 NOTE — PROGRESS NOTES
12/26/21 1100   Signing Clinician's Name / Credentials   Signing clinician's name / credentials Richelle Glass PT   Quick Adds   Rehab Discipline PT   Therapeutic Activity   Minutes of Treatment 25 minutes   Symptoms Noted During/After Treatment Fatigue;Significant change in vital signs   Treatment Detail Pt wanted to use toilet in bathroom. Currently on 5 lpm cont 02.  Assisted him with 02 tubing , otherwise pt able to safely ambul to/from toilet with supervision. Pt urinated and then walked back to recliner. p02 satss dropped to 78% at lowest and were b/t 78-88% x approx 10 mns before stabilizing. Placed him on 6 lpm cont during recovery time. Pt then ambul pushing w/c around room while using 02 tank on 5 lpm cont 02 x 35 ft total in room req SBA/CGA 1. P02 sats dropped again to 78-85% for approx 10 mns with deeper longer inhaling encouraged through the nose. Recovered to 89%. No visible signs of discomfort or signif SOB. Discussed lying prone if needed.    PT Discharge Planning    PT Discharge Recommendation (DC Rec) Per plan established by the PT   PT Rationale for DC Rec low p02 sats, fatigue, debility   PT Brief overview of current status  Pt wanted to use toilet in bathroom. Currently on 5 lpm cont 02.  Assisted him with 02 tubing , otherwise pt able to safely ambul to/from toilet with supervision. Pt urinated and then walked back to recliner. p02 satss dropped to 78% at lowest and were b/t 78-88% x approx 10 mns before stabilizing. Placed him on 6 lpm cont during recovery time. Pt then ambul pushing w/c around room while using 02 tank on 5 lpm cont 02 x 35 ft total in room req SBA/CGA 1. P02 sats dropped again to 78-85% for approx 10 mns with deeper longer inhaling encouraged through the nose. Recovered to 89%. No visible signs of discomfort or signif SOB. Discussed lying prone if needed.    Additional Documentation   Rehab Comments limited activity tolerance continues but incrd ambulation tolerated  this date.    PT Plan Progress activity as tolerated   Total Session Time   Total Session Time (minutes) 30 minutes

## 2021-12-26 NOTE — PLAN OF CARE
"/63 (BP Location: Left arm)   Pulse 83   Temp 97.1  F (36.2  C) (Tympanic)   Resp 18   Ht 1.778 m (5' 10\")   Wt 72.7 kg (160 lb 4.4 oz)   SpO2 95%   BMI 23.00 kg/m      Pt is A&O, VSS, and afebrile. SpO2 has been in upper 90's at rest. Started the shift at 8 LPM, now down to 6 LPM high flow NC. Drops with activity but recovers well. Inhalers, IS and flutter continued. Encouraged coughing and deep breathing. Lungs have fine crackles noted to the bases. CT scan came back as looking better than prior imaging and negative for PE, see report. Appetite was good, using bedside commode and urinal. Call light within reach and calls appropriately. Face to face report given with opportunity to observe patient.    Report given to MICHAEL Morris RN   12/25/2021  11:00 PM     "

## 2021-12-27 ENCOUNTER — DOCUMENTATION ONLY (OUTPATIENT)
Dept: HOME HEALTH SERVICES | Facility: CLINIC | Age: 56
End: 2021-12-27

## 2021-12-27 VITALS
HEIGHT: 70 IN | OXYGEN SATURATION: 93 % | BODY MASS INDEX: 22.95 KG/M2 | WEIGHT: 160.27 LBS | DIASTOLIC BLOOD PRESSURE: 76 MMHG | HEART RATE: 101 BPM | RESPIRATION RATE: 18 BRPM | SYSTOLIC BLOOD PRESSURE: 113 MMHG | TEMPERATURE: 96.9 F

## 2021-12-27 LAB
ALBUMIN SERPL-MCNC: 2.7 G/DL (ref 3.4–5)
ALP SERPL-CCNC: 100 U/L (ref 40–150)
ALT SERPL W P-5'-P-CCNC: 364 U/L (ref 0–70)
ANION GAP SERPL CALCULATED.3IONS-SCNC: 4 MMOL/L (ref 3–14)
AST SERPL W P-5'-P-CCNC: 67 U/L (ref 0–45)
BILIRUB SERPL-MCNC: 0.4 MG/DL (ref 0.2–1.3)
BUN SERPL-MCNC: 24 MG/DL (ref 7–30)
CALCIUM SERPL-MCNC: 8.7 MG/DL (ref 8.5–10.1)
CHLORIDE BLD-SCNC: 105 MMOL/L (ref 94–109)
CO2 SERPL-SCNC: 28 MMOL/L (ref 20–32)
CREAT SERPL-MCNC: 0.82 MG/DL (ref 0.66–1.25)
ERYTHROCYTE [DISTWIDTH] IN BLOOD BY AUTOMATED COUNT: 13.2 % (ref 10–15)
GFR SERPL CREATININE-BSD FRML MDRD: >90 ML/MIN/1.73M2
GLUCOSE BLD-MCNC: 95 MG/DL (ref 70–99)
HCT VFR BLD AUTO: 40 % (ref 40–53)
HGB BLD-MCNC: 13.3 G/DL (ref 13.3–17.7)
MCH RBC QN AUTO: 30.6 PG (ref 26.5–33)
MCHC RBC AUTO-ENTMCNC: 33.3 G/DL (ref 31.5–36.5)
MCV RBC AUTO: 92 FL (ref 78–100)
PLATELET # BLD AUTO: 208 10E3/UL (ref 150–450)
POTASSIUM BLD-SCNC: 4.7 MMOL/L (ref 3.4–5.3)
PROT SERPL-MCNC: 6.4 G/DL (ref 6.8–8.8)
RBC # BLD AUTO: 4.34 10E6/UL (ref 4.4–5.9)
SODIUM SERPL-SCNC: 137 MMOL/L (ref 133–144)
WBC # BLD AUTO: 8.3 10E3/UL (ref 4–11)

## 2021-12-27 PROCEDURE — 80053 COMPREHEN METABOLIC PANEL: CPT | Performed by: INTERNAL MEDICINE

## 2021-12-27 PROCEDURE — 250N000011 HC RX IP 250 OP 636: Performed by: NURSE PRACTITIONER

## 2021-12-27 PROCEDURE — 250N000013 HC RX MED GY IP 250 OP 250 PS 637: Performed by: HOSPITALIST

## 2021-12-27 PROCEDURE — 250N000013 HC RX MED GY IP 250 OP 250 PS 637: Performed by: NURSE PRACTITIONER

## 2021-12-27 PROCEDURE — 36415 COLL VENOUS BLD VENIPUNCTURE: CPT | Performed by: INTERNAL MEDICINE

## 2021-12-27 PROCEDURE — 85027 COMPLETE CBC AUTOMATED: CPT | Performed by: INTERNAL MEDICINE

## 2021-12-27 PROCEDURE — 99239 HOSP IP/OBS DSCHRG MGMT >30: CPT | Performed by: INTERNAL MEDICINE

## 2021-12-27 RX ADMIN — ALBUTEROL SULFATE 2 PUFF: 90 AEROSOL, METERED RESPIRATORY (INHALATION) at 09:04

## 2021-12-27 RX ADMIN — PANTOPRAZOLE SODIUM 40 MG: 40 TABLET, DELAYED RELEASE ORAL at 09:04

## 2021-12-27 RX ADMIN — ENOXAPARIN SODIUM 40 MG: 40 INJECTION SUBCUTANEOUS at 09:04

## 2021-12-27 RX ADMIN — ASPIRIN 81 MG: 81 TABLET, COATED ORAL at 09:03

## 2021-12-27 ASSESSMENT — ACTIVITIES OF DAILY LIVING (ADL)
ADLS_ACUITY_SCORE: 5

## 2021-12-27 NOTE — PROGRESS NOTES
Patient Name: Tomasz Newman   MRN: [id number]   Admit Date: 12/14/2021    Current Infection Status: COVID-19   Current Isolation Status: Special Precautions-COVID-19     Review of COVID-19 status and required isolation precautions    Refer to Special Precautions Duration and Period of Transmissibility Guideline, in Policy Tech.        Involved parties: ALONSO BURGESS, Infection Prevention and Other Corine RODRIGUEZ.    Criteria used to make decision: Vitals Dates: Improved oxygenation, Symptoms Dates: no fevers and Other off Decadron.    The involved parties have reviewed this patient's chart per the Special Precautions Duration and Period of Transmissibility guideline and have taken the following action:     DECISION - OPTION 1: Patient meets all the criteria for Special Precautions isolation discontinuation and this writer will resolve the COVID-19 infection flag and isolation status, due to: Immunocompetent Symptomatic: Severe or Critical, MIS-C (Admit PCR+): At least 20 days since symptoms appeared AND greater than or equal to 24hrs since last fever (without fever-reducing meds) AND COVID-19 symptoms have substantially improved. .       ALONSO BURGESS, Infection Prevention

## 2021-12-27 NOTE — PLAN OF CARE
"BP 94/69 (BP Location: Right arm, Patient Position: Supine)   Pulse 87   Temp 96.8  F (36  C) (Tympanic)   Resp 18   Ht 1.778 m (5' 10\")   Wt 72.7 kg (160 lb 4.4 oz)   SpO2 95%   BMI 23.00 kg/m       Pt A&O, afebrile, VS and assessment as charted, independent for short distances to commode etc, free of falls or injury this shift, using urinal independently, calls appropriately with call light in reach. Denies pain. Currently on 3 LPM O2 via NC, sats stable at rest. LS with fine crackles to bases. IVs SL. BPs a little softer, which looks like his norm.    Face to face report given with opportunity to observe patient.    Report given to MICHAEL Pool RN   12/27/2021  7:16 AM         "

## 2021-12-27 NOTE — PROGRESS NOTES
I certify that this patient, Tomasz Newman has been under my care (or a nurse practitioner or physican's assistant working with me). This is the face-to-face encounter for oxygen medical necessity.      Tomasz Newman is now in a chronic stable state and continues to require supplemental oxygen. Patient has continued oxygen desaturation due to COVID-19 pneumonia.    Alternative treatment(s) tried or considered and deemed clinically infective for treatment of COVID-19 pneumonia include inhalers, steroids, pulmonary toileting and Baricitinib.  If portability is ordered, is the patient mobile within the home? yes    **Patients who qualify for home O2 coverage under the CMS guidelines require ABG tests or O2 sat readings obtained closest to, but no earlier than 2 days prior to the discharge, as evidence of the need for home oxygen therapy. Testing must be performed while patient is in the chronic stable state. See notes for O2 sats.**

## 2021-12-27 NOTE — PLAN OF CARE
Patient discharged at 1:22 PM via wheel chair accompanied by staff. Prescriptions sent to patients preferred pharmacy. All belongings sent with patient.     Discharge instructions reviewed with patient. Listed belongings gathered and returned to patient. yes    Patient discharged to home.   Report called to N/A    Surgical Patient   Surgical Procedures during stay: N/A  Did patient receive discharge instruction on wound care and recognition of infection symptoms? N/A    MISC  Follow up appointment made:  Yes  Home medications returned to patient: N/A  Patient reports pain was well managed at discharge: Yes

## 2021-12-27 NOTE — PROGRESS NOTES
Physical Therapy Discharge Summary    Reason for therapy discharge:    Discharged to home.    Progress towards therapy goal(s). See goals on Care Plan in [facility] electronic health record for goal details.  Goals met    Therapy recommendation(s):    Continue home exercise program.

## 2021-12-27 NOTE — PLAN OF CARE
"/69 (BP Location: Right arm)   Pulse 90   Temp 97  F (36.1  C) (Tympanic)   Resp 18   Ht 1.778 m (5' 10\")   Wt 72.7 kg (160 lb 4.4 oz)   SpO2 (!) 90%   BMI 23.00 kg/m       A&O, VSS, and afebrile. Denies pain. SpO2 has been in mid-90's on 4 LPM. Lungs have fine crackles in bases, infrequent cough as well. Denies SOB at rest. Saline locked. Inhalers and anticoagulants continued see MAR. Appetite was good, using bedside urinal and commode.     Pt ambulated approx. 115 ft on 6 LPM NC with nursing staff in the PUI. Tolerated very well and denied SOB or dizziness. SpO2 upon returning to the room was 89%.     Independent in the room, calls appropriately. Call light in reach. Face to face report given with opportunity to observe patient.    Report given to MICHAEL Morris RN   12/26/2021  11:00 PM    "

## 2021-12-27 NOTE — DISCHARGE INSTRUCTIONS
You have a follow up appointment with Dr. Ramirez on Monday January 3rd at 10:00 A.M please arrive by 9:45 for check in, thank you. Masks are required for your appointment.         Reminder to follow up with your PCP related to your sleep disturbance for any recommendations for improve sleep health.

## 2021-12-27 NOTE — PROGRESS NOTES
Pulled Order off [employer], received call from Reshma ARREDONDO at [phone].  As soon as the order is worked we will be up to deliver a tank to get home    Called and offered choice of vendor, he has chosen to use us.

## 2021-12-28 ENCOUNTER — TELEPHONE (OUTPATIENT)
Dept: CASE MANAGEMENT | Facility: HOSPITAL | Age: 56
End: 2021-12-28
Payer: COMMERCIAL

## 2021-12-28 NOTE — TELEPHONE ENCOUNTER
Tomasz Newman, was discharged to home on 12/27/21 from [facility]. I spoke today with him regarding the patient's discharge.   He indicates he has receive(d) sufficient information upon discharge. Medications were reviewed in full on discharge, including: Medications to be started; medications to be stopped; medications to be continued from preadmission and any side effects. Prescriptions were received at discharge and were able to be filled. Medications are being taken as prescribed.   He indicates they have an appointment scheduled for 1/3/22 and have transportation available. They are able to confirm their appointment time and have it written down.   He did not have any questions regarding discharge instructions or condition.  Per their request, the following employee (s) can be recognized for their outstanding services delivered:  All the nurses were great  Suggestions to improve service: No  He was informed they may receive a survey in the mail from the hospital. Asked if they would kindly complete the survey in order for [facility] to know if services fully met patient needs.    Desi Alvarado RN on 12/28/2021 at 12:19 PM

## 2022-01-22 ENCOUNTER — HEALTH MAINTENANCE LETTER (OUTPATIENT)
Age: 57
End: 2022-01-22

## 2022-01-29 NOTE — DISCHARGE SUMMARY
[facility]  Hospitalist Discharge Summary      Date of Admission:  12/14/2021  Date of Discharge:  12/27/2021  1:23 PM  Discharging Provider: Real Raymundo MD  Discharge Service: Hospitalist Service    Discharge Diagnoses     Acute respiratory failure with hypoxia  Pneumonia due to 2019 novel coronavirus  Hypertension      Follow-ups Needed After Discharge   Follow-up Appointments     Follow-up and recommended labs and tests       Follow up with primary care provider, MIK SUMMERS, within 7 days for   hospital follow- up.  No follow up labs or test are needed.         None    Unresulted Labs Ordered in the Past 30 Days of this Admission     No orders found from 11/14/2021 to 12/15/2021.      These results will be followed up by not needed    Discharge Disposition   Discharged to home  Condition at discharge: Stable      Hospital Course   76-year-old gentleman who was feeling some symptoms of cough congestion back in November 26.  He was tested and was positive for Covid on November 29.  He was basically getting better he felt.  Presented to clinic to have some paperwork signed and at that time was found to be rather hypoxic in the 70% range.  He had a cough but no real fevers at all.  He was subsequently sent to the ER for evaluation.  His O2 sats remained rather low in the ER 80% on 4 L bumped up to 6 L.  CT was performed which shows bilateral infiltrates consistent with COVID-19 pneumonia.  He had very mild elevation procalcitonin 0.24.  He was treated with steroids also given antibiotics Rocephin and azithromycin.  Also remdesivir.    He will most part was relatively asymptomatic however he did end up getting increased O2 requirements had to be placed on air Vo 40 L and 40% FiO2 to get bumped up for a while.  Gradually improved to the point where he was on 3 L nasal cannula on [place] Day we had actually look forward to probably discharging the next day however then he is dropped significantly  down and required 15 L of high flow nasal cannula.  Asymptomatic.  Hemodynamically stable.  No fevers.  He did undergo a repeat CT angiogram which was negative for pulmonary embolism.  Over the next couple of days his sats did improve down to 3 L with sats in the mid 90% range.  And eventually on the day of his discharge he was sent home on 3 L of oxygen.    Never had any evidence of actual bacterial infection.  Felt this is all just due to his COVID-19 infection.    At discharge labs were within normal limits.  His CRP had been undetectable.  He had normal white count 8300 hemoglobin 13.3.      Consultations This Hospital Stay   PHARMACY TO [facility]  PHYSICAL THERAPY ADULT IP CONSULT  RESPIRATORY CARE IP CONSULT    Code Status   Full Code    Time Spent on this Encounter   I, Real Raymundo MD, personally saw the patient today and spent greater than 30 minutes discharging this patient.       Real Raymundo MD  [facility]  [address] [place] [id number]  Phone: [phone]  Fax: [phone]  ______________________________________________________________________    Physical Exam   Vital Signs:                    Weight: 160 lbs 4.39 oz  Constitutional: awake, alert, cooperative, no apparent distress, and appears stated age  Respiratory: No increased work of breathing, good air exchange, clear to auscultation bilaterally, no crackles or wheezing and rare crackles at the bases  Cardiovascular: Normal apical impulse, regular rate and rhythm, normal S1 and S2, no S3 or S4, and no murmur noted  GI: No scars, normal bowel sounds, soft, non-distended, non-tender, no masses palpated, no hepatosplenomegally  Musculoskeletal: There is no redness, warmth, or swelling of the joints.  Full range of motion noted.  Motor strength is 5 out of 5 all extremities bilaterally.  Tone is normal.  no lower extremity pitting edema present       Primary Care Physician   MIK SUMMERS    Discharge Orders       COVID-19 GetWell Loop Referral      Reason for your hospital stay    Patient was admitted with significant hypoxia.  Found to be Covid positive.  Treated with appropriate medications.  Slow improvement now to the point of being on 1 to 3 L.  Will be able to be discharged home.     Follow-up and recommended labs and tests     Follow up with primary care provider, MIK SUMMERS, within 7 days for hospital follow- up.  No follow up labs or test are needed.     Activity    Your activity upon discharge: activity as tolerated     Full Code     Oxygen Adult/Peds     Diet    Follow this diet upon discharge: Orders Placed This Encounter      Combination Diet Regular Diet Adult       Significant Results and Procedures   Most Recent 3 CBC's:Recent Labs   Lab Test 12/27/21  0556 12/26/21  0554 12/25/21  0552   WBC 8.3 7.3 11.8*   HGB 13.3 13.6 13.4   MCV 92 94 93    232 277     Most Recent 3 BMP's:Recent Labs   Lab Test 12/27/21  0556 12/26/21  0554 12/25/21  0552 12/24/21  0520 12/23/21  0601    139  --   --  138   POTASSIUM 4.7 4.5 4.2   < > 4.5   CHLORIDE 105 107  --   --  105   CO2 28 29  --   --  30   BUN 24 28  --   --  25   CR 0.82 0.86  --   --  0.79   ANIONGAP 4 3  --   --  3   ANGELA 8.7 8.5  --   --  8.7   GLC 95 89  --   --  105*    < > = values in this interval not displayed.     Most Recent 2 LFT's:Recent Labs   Lab Test 12/27/21  0556 12/26/21  0554   AST 67* 119*   * 488*   ALKPHOS 100 104   BILITOTAL 0.4 0.3     Most Recent 3 Troponin's:No lab results found.  Most Recent 3 BNP's:No lab results found.  Most Recent D-dimer:Recent Labs   Lab Test 12/25/21  1238   DD 2.24*     Most Recent TSH and T4:No lab results found.  Most Recent 6 glucoses:Recent Labs   Lab Test 12/27/21  0556 12/26/21  0554 12/23/21  0601 12/22/21  0537 12/21/21  0634 12/18/21  0611   GLC 95 89 105* 110* 96 101*     Most Recent ABG:No lab results found.  Most Recent ESR & CRP:Recent Labs   Lab Test 12/25/21  0589   CRP  <2.9   ,   Results for orders placed or performed during the hospital encounter of 12/14/21   CTA Chest with Contrast    Narrative    PROCEDURE: CTA CHEST WITH CONTRAST 12/14/2021 4:34 PM    HISTORY: PE suspected, low/intermediate prob, positive D-dimer      COMPARISONS: None.    Meds/Dose Given:  60mL    TECHNIQUE: CT angiogram of the chest with sagittal and coronal mip  reconstructions    FINDINGS: There are no pulmonary emboli. The heart is normal in size.  The thoracic aorta appears normal.    There are widespread pulmonary parenchymal opacities consistent with  pneumonia. There are mildly enlarged hilar and mediastinal lymph  nodes. Graph the upper portion of the liver spleen and pancreas appear  normal. The adrenal glands are normal. Regional skeleton is intact.         Impression    IMPRESSION: Widespread pulmonary parenchymal opacities consistent with  pneumonia.    No pulmonary emboli.    MEKHI CARVAJAL MD         SYSTEM ID:  [id number]   CTA Chest with Contrast    Narrative    Exam:  CTA CHEST WITH CONTRAST    Exam reason:  PE suspected, low/intermediate prob, positive D-dimer;  covid  sudden drop in Oxygen sats    Technique:  Contrast enhanced helical CT pulmonary angiography was  performed. Sagittal and coronal reformats as well as coronal MIP  reformats were obtained.     Meds/Contrast: Isovue 370 58ml Given    Comparison:  12/14/2021    FINDINGS:    Technical quality: Diagnostic.    Cardiovascular:   -There are no filling defects in the pulmonary arteries that would  indicate pulmonary embolism.  -No aortic or other cardiovascular abnormalities.  Lungs/Airways: There are patchy foci of groundglass opacity and  consolidation throughout the lungs, slightly improved compared to  12/14/2021.  Radha/Mediastinum: No adenopathy or mass.  Pleura: No pleural effusion or pneumothorax.  Chest Wall/Axilla: Unremarkable.    Visualized Upper Abdomen: No acute findings in the visualized  upper  abdomen.  Musculoskeletal: No acute osseous abnormality.      Impression    IMPRESSION:    No pulmonary emboli.    Patchy foci of ground glass opacity and consolidation throughout the  lungs, slightly improved compared to 12/14/2021.    RUSLAN ZARATE MD         SYSTEM ID:  [id number]       Discharge Medications   Discharge Medication List as of 12/27/2021 12:55 PM      CONTINUE these medications which have NOT CHANGED    Details   aspirin 81 MG EC tablet Take 81 mg by mouth daily, Historical      cyclobenzaprine (FLEXERIL) 10 MG tablet Take 10 mg by mouth 3 times daily as needed for muscle spasms, Historical      naproxen sodium (ANAPROX) 220 MG tablet Take 440 mg by mouth daily as needed for moderate pain, Historical      omeprazole (PRILOSEC) 20 MG DR capsule Take 20 mg by mouth daily, Historical      traMADol (ULTRAM) 50 MG tablet Take 50 mg by mouth every 6 hours as needed, Historical           Allergies   Allergies   Allergen Reactions     Percocet [Oxycodone-Acetaminophen]      Facial Swelling

## 2022-03-24 ENCOUNTER — HOSPITAL ENCOUNTER (EMERGENCY)
Facility: HOSPITAL | Age: 57
Discharge: HOME | End: 2022-03-24
Attending: NURSE PRACTITIONER | Admitting: NURSE PRACTITIONER
Payer: COMMERCIAL

## 2022-03-24 VITALS
DIASTOLIC BLOOD PRESSURE: 105 MMHG | TEMPERATURE: 98.6 F | SYSTOLIC BLOOD PRESSURE: 152 MMHG | RESPIRATION RATE: 18 BRPM | HEART RATE: 107 BPM | OXYGEN SATURATION: 96 %

## 2022-03-24 DIAGNOSIS — J02.9 PHARYNGITIS, ACUTE: Primary | ICD-10-CM

## 2022-03-24 DIAGNOSIS — J02.9 ACUTE PHARYNGITIS, UNSPECIFIED ETIOLOGY: ICD-10-CM

## 2022-03-24 LAB
GROUP A STREP BY PCR: NOT DETECTED
HOLD SPECIMEN: NORMAL
TSH SERPL DL<=0.005 MIU/L-ACNC: 1.1 MU/L (ref 0.4–4)

## 2022-03-24 PROCEDURE — G0463 HOSPITAL OUTPT CLINIC VISIT: HCPCS

## 2022-03-24 PROCEDURE — 87651 STREP A DNA AMP PROBE: CPT | Performed by: NURSE PRACTITIONER

## 2022-03-24 PROCEDURE — 84443 ASSAY THYROID STIM HORMONE: CPT | Performed by: NURSE PRACTITIONER

## 2022-03-24 PROCEDURE — 99213 OFFICE O/P EST LOW 20 MIN: CPT | Performed by: NURSE PRACTITIONER

## 2022-03-24 PROCEDURE — 36415 COLL VENOUS BLD VENIPUNCTURE: CPT | Performed by: NURSE PRACTITIONER

## 2022-03-24 RX ORDER — AMOXICILLIN 500 MG/1
500 CAPSULE ORAL 2 TIMES DAILY
Qty: 30 CAPSULE | Refills: 0 | Status: SHIPPED | OUTPATIENT
Start: 2022-03-24 | End: 2022-04-03

## 2022-03-24 ASSESSMENT — ENCOUNTER SYMPTOMS
MYALGIAS: 0
PHOTOPHOBIA: 0
SHORTNESS OF BREATH: 0
CHILLS: 0
EYE REDNESS: 0
EYE ITCHING: 0
HEADACHES: 0
DIZZINESS: 0
NAUSEA: 0
EYE PAIN: 0
PALPITATIONS: 0
VOMITING: 0
PSYCHIATRIC NEGATIVE: 1
SORE THROAT: 1
DIARRHEA: 0
FEVER: 0
FATIGUE: 0
COUGH: 1
TROUBLE SWALLOWING: 0

## 2022-03-24 NOTE — DISCHARGE INSTRUCTIONS
Amoxicillin as ordered  - Take entire course of antibiotic even if you start to feel better.  - Antibiotics can cause stomach upset including nausea and diarrhea. Read your bottle or ask the pharmacist if antibiotic can be taken with food to help prevent nausea. If you have symptoms of diarrhea you can take an over-the-counter probiotic and/or increase foods with probiotics such as yogurt, [place], sauerkraut.    Push fluids to stay hydrated.    Follow-up with primary care provider or return to urgent care-ED with any worsening in condition or additional concerns

## 2022-03-24 NOTE — ED TRIAGE NOTES
Patient stated that he has pain in the back of his throat. Also his glands on each side of throat are swelled and giving him discomfort.

## 2022-03-24 NOTE — ED PROVIDER NOTES
History     Chief Complaint   Patient presents with     Pharyngitis     HPI  Tomasz Newman is a 56 year old male who presents to urgent care today ambulatory with complaints of a sore throat, congestion, ear pain and cough which has been ongoing for almost 2 weeks.  Denies any difficulty swallowing.  Denies any fever, chills, nausea, vomiting, diarrhea, shortness of breath or chest pain.  Staying hydrated, normal output.  No rashes.  History of Covid positive infection 12/21 with 13 days admission.  Declines any Covid, influenza or RSV testing today.      Patient has elevated blood pressure reading.  Denies any headache, dizziness, vision changes, nosebleeds, shortness of breath or chest pain.    No other concerns.    Allergies:  Allergies   Allergen Reactions     Percocet [Oxycodone-Acetaminophen]      Facial Swelling       Problem List:    Patient Active Problem List    Diagnosis Date Noted     Acute respiratory failure with hypoxia (H) 12/15/2021     Priority: Medium     Pneumonia due to 2019 novel coronavirus 12/14/2021     Priority: Medium     Benign essential HTN 10/09/2020     Priority: Medium        Past Medical History:    No past medical history on file.    Past Surgical History:    Past Surgical History:   Procedure Laterality Date     HERNIA REPAIR  2002       Family History:    Family History   Problem Relation Age of Onset     Cancer - colorectal Father      Diabetes Brother      Heart Disease Mother         heart disease       Social History:  Marital Status:  Single [1]  Social History     Tobacco Use     Smoking status: Never Smoker     Smokeless tobacco: Never Used     Tobacco comment: no passive exposure   Substance Use Topics     Alcohol use: Yes     Comment: wine 3 glasses occasionally     Drug use: Not on file        Medications:    amoxicillin (AMOXIL) 500 MG capsule  aspirin 81 MG EC tablet  cyclobenzaprine (FLEXERIL) 10 MG tablet  naproxen sodium (ANAPROX) 220 MG tablet  omeprazole  (PRILOSEC) 20 MG DR capsule  traMADol (ULTRAM) 50 MG tablet      Review of Systems   Constitutional: Negative for chills, fatigue and fever.   HENT: Positive for congestion, ear pain (right) and sore throat. Negative for nosebleeds and trouble swallowing.    Eyes: Negative for photophobia, pain, redness, itching and visual disturbance.   Respiratory: Positive for cough. Negative for shortness of breath.    Cardiovascular: Negative for chest pain and palpitations.   Gastrointestinal: Negative for diarrhea, nausea and vomiting.   Genitourinary: Negative for decreased urine volume.   Musculoskeletal: Negative for myalgias.   Skin: Negative for rash.   Neurological: Negative for dizziness and headaches.   Psychiatric/Behavioral: Negative.      Physical Exam   BP: (!) 150/107 (left arm)  Pulse: 107  Temp: 98.6  F (37  C)  Resp: 18  SpO2: 96 %  AP: 92    Physical Exam  Vitals and nursing note reviewed.   Constitutional:       General: He is not in acute distress.     Appearance: He is well-developed. He is not ill-appearing or toxic-appearing.   HENT:      Head: Normocephalic.      Right Ear: Tympanic membrane, ear canal and external ear normal.      Left Ear: Tympanic membrane, ear canal and external ear normal.      Nose: Nose normal.      Mouth/Throat:      Mouth: Mucous membranes are moist.      Pharynx: Oropharynx is clear. Posterior oropharyngeal erythema present. No oropharyngeal exudate.      Tonsils: No tonsillar exudate or tonsillar abscesses.   Cardiovascular:      Rate and Rhythm: Normal rate and regular rhythm.      Pulses: Normal pulses.      Heart sounds: Normal heart sounds.   Pulmonary:      Effort: Pulmonary effort is normal.      Breath sounds: Normal breath sounds.   Musculoskeletal:      Cervical back: Normal range of motion and neck supple. No rigidity or tenderness.   Lymphadenopathy:      Cervical: No cervical adenopathy.   Neurological:      Mental Status: He is alert.   Psychiatric:         Mood  and Affect: Mood normal.       ED Course     Results for orders placed or performed during the hospital encounter of 03/24/22 (from the past 24 hour(s))   Group A Streptococcus PCR Throat Swab    Specimen: Throat; Swab   Result Value Ref [place]    Group A strep by PCR Not Detected Not Detected    Narrative    The Xpert Xpress Strep A test, performed on the [employer] Systems, is a rapid, qualitative in vitro diagnostic test for the detection of Streptococcus pyogenes (Group A ß-hemolytic Streptococcus, Strep A) in throat swab specimens from patients with signs and symptoms of pharyngitis. The Xpert Xpress Strep A test can be used as an aid in the diagnosis of Group A Streptococcal pharyngitis. The assay is not intended to monitor treatment for Group A Streptococcus infections. The Xpert Xpress Strep A test utilizes an automated real-time polymerase chain reaction (PCR) to detect Streptococcus pyogenes DNA.   TSH with free T4 reflex   Result Value Ref [place]    TSH 1.10 0.40 - 4.00 mU/L   Extra Tube    Narrative    The following orders were created for panel order Extra Tube.  Procedure                               Abnormality         Status                     ---------                               -----------         ------                     Extra Blue Top Tube[[id number]]                              Final result               Extra Red Top Tube[[id number]]                               Final result               Extra Purple Top Tube[[id number]]                            Final result               Extra Green Top (Lithium...[[id number]]                      Final result                 Please view results for these tests on the individual orders.   Extra Blue Top Tube   Result Value Ref [place]    Hold Specimen JIC    Extra Red Top Tube   Result Value Ref [place]    Hold Specimen JIC    Extra Purple Top Tube   Result Value Ref [place]    Hold Specimen JIC    Extra Green Top (Lithium Heparin) ON ICE   Result  Value Ref [place]    Hold Specimen         Medications - No data to display    Assessments & Plan (with Medical Decision Making)     I have reviewed the nursing notes.    I have reviewed the findings, diagnosis, plan and need for follow up with the patient.  (J02.9) Pharyngitis, acute  (primary encounter diagnosis)  Plan:   Patient ambulatory with a nontoxic appearance.  Denies any fever, chills, nausea, vomiting, diarrhea, shortness of breath or chest pain.  Denies any difficulty swallowing.  Strep test negative.  TSH normal.  Symptoms have been ongoing for almost 2 weeks and patient would like to try an antibiotic to see if it will help, reviewed risk versus benefits.  Offered ENT referral, declined at this time.  Amoxicillin ordered given length of symptoms being almost 2 weeks in duration antibiotics seems reasonable.  No signs of tonsillar abscess.  No tonsil stones.  Patient to follow-up with primary care provider or return to urgent care-ED with any worsening in condition or additional concerns.  Patient is in agreement with treatment plan.    Discharge Medication List as of 3/24/2022  6:36 PM      START taking these medications    Details   amoxicillin (AMOXIL) 500 MG capsule Take 1 capsule (500 mg) by mouth 2 times daily for 10 days Take 1 capsule (500 mg) by mouth 2 times daily, Disp-30 capsule, R-0, [facility]           Final diagnoses:   Pharyngitis, acute     3/24/2022   [facility]     Pilar Adam NP  03/24/22 2009

## 2022-05-25 ENCOUNTER — APPOINTMENT (OUTPATIENT)
Dept: CT IMAGING | Facility: HOSPITAL | Age: 57
End: 2022-05-25
Attending: PHYSICIAN ASSISTANT
Payer: COMMERCIAL

## 2022-05-25 ENCOUNTER — HOSPITAL ENCOUNTER (EMERGENCY)
Facility: HOSPITAL | Age: 57
Discharge: HOME | End: 2022-05-25
Attending: PHYSICIAN ASSISTANT | Admitting: PHYSICIAN ASSISTANT
Payer: COMMERCIAL

## 2022-05-25 VITALS
SYSTOLIC BLOOD PRESSURE: 137 MMHG | HEART RATE: 81 BPM | OXYGEN SATURATION: 94 % | DIASTOLIC BLOOD PRESSURE: 97 MMHG | TEMPERATURE: 97.7 F | RESPIRATION RATE: 16 BRPM

## 2022-05-25 DIAGNOSIS — K57.32 DIVERTICULITIS OF COLON: ICD-10-CM

## 2022-05-25 LAB
ALBUMIN SERPL-MCNC: 3.9 G/DL (ref 3.4–5)
ALP SERPL-CCNC: 77 U/L (ref 40–150)
ALT SERPL W P-5'-P-CCNC: 39 U/L (ref 0–70)
ANION GAP SERPL CALCULATED.3IONS-SCNC: 3 MMOL/L (ref 3–14)
AST SERPL W P-5'-P-CCNC: 29 U/L (ref 0–45)
BASOPHILS # BLD AUTO: 0 10E3/UL (ref 0–0.2)
BASOPHILS NFR BLD AUTO: 1 %
BILIRUB SERPL-MCNC: 0.6 MG/DL (ref 0.2–1.3)
BUN SERPL-MCNC: 14 MG/DL (ref 7–30)
CALCIUM SERPL-MCNC: 9 MG/DL (ref 8.5–10.1)
CHLORIDE BLD-SCNC: 108 MMOL/L (ref 94–109)
CO2 SERPL-SCNC: 27 MMOL/L (ref 20–32)
CREAT SERPL-MCNC: 0.9 MG/DL (ref 0.66–1.25)
CRP SERPL-MCNC: 7.4 MG/L (ref 0–8)
EOSINOPHIL # BLD AUTO: 0.1 10E3/UL (ref 0–0.7)
EOSINOPHIL NFR BLD AUTO: 1 %
ERYTHROCYTE [DISTWIDTH] IN BLOOD BY AUTOMATED COUNT: 13.3 % (ref 10–15)
GFR SERPL CREATININE-BSD FRML MDRD: >90 ML/MIN/1.73M2
GLUCOSE BLD-MCNC: 104 MG/DL (ref 70–99)
HCT VFR BLD AUTO: 44.4 % (ref 40–53)
HGB BLD-MCNC: 15.2 G/DL (ref 13.3–17.7)
HOLD SPECIMEN: NORMAL
IMM GRANULOCYTES # BLD: 0 10E3/UL
IMM GRANULOCYTES NFR BLD: 0 %
INR PPP: 1.07 (ref 0.85–1.15)
LACTATE SERPL-SCNC: 0.9 MMOL/L (ref 0.7–2)
LIPASE SERPL-CCNC: 81 U/L (ref 73–393)
LYMPHOCYTES # BLD AUTO: 1.2 10E3/UL (ref 0.8–5.3)
LYMPHOCYTES NFR BLD AUTO: 22 %
MCH RBC QN AUTO: 30.3 PG (ref 26.5–33)
MCHC RBC AUTO-ENTMCNC: 34.2 G/DL (ref 31.5–36.5)
MCV RBC AUTO: 89 FL (ref 78–100)
MONOCYTES # BLD AUTO: 0.8 10E3/UL (ref 0–1.3)
MONOCYTES NFR BLD AUTO: 14 %
NEUTROPHILS # BLD AUTO: 3.6 10E3/UL (ref 1.6–8.3)
NEUTROPHILS NFR BLD AUTO: 62 %
NRBC # BLD AUTO: 0 10E3/UL
NRBC BLD AUTO-RTO: 0 /100
PLATELET # BLD AUTO: 224 10E3/UL (ref 150–450)
POTASSIUM BLD-SCNC: 3.7 MMOL/L (ref 3.4–5.3)
PROT SERPL-MCNC: 7.3 G/DL (ref 6.8–8.8)
RBC # BLD AUTO: 5.01 10E6/UL (ref 4.4–5.9)
SODIUM SERPL-SCNC: 138 MMOL/L (ref 133–144)
WBC # BLD AUTO: 5.8 10E3/UL (ref 4–11)

## 2022-05-25 PROCEDURE — 83605 ASSAY OF LACTIC ACID: CPT | Performed by: PHYSICIAN ASSISTANT

## 2022-05-25 PROCEDURE — 85610 PROTHROMBIN TIME: CPT | Performed by: PHYSICIAN ASSISTANT

## 2022-05-25 PROCEDURE — 250N000013 HC RX MED GY IP 250 OP 250 PS 637: Performed by: PHYSICIAN ASSISTANT

## 2022-05-25 PROCEDURE — 83690 ASSAY OF LIPASE: CPT | Performed by: PHYSICIAN ASSISTANT

## 2022-05-25 PROCEDURE — 80053 COMPREHEN METABOLIC PANEL: CPT | Performed by: PHYSICIAN ASSISTANT

## 2022-05-25 PROCEDURE — 74177 CT ABD & PELVIS W/CONTRAST: CPT

## 2022-05-25 PROCEDURE — 36415 COLL VENOUS BLD VENIPUNCTURE: CPT | Performed by: PHYSICIAN ASSISTANT

## 2022-05-25 PROCEDURE — 250N000011 HC RX IP 250 OP 636: Performed by: PHYSICIAN ASSISTANT

## 2022-05-25 PROCEDURE — 99284 EMERGENCY DEPT VISIT MOD MDM: CPT | Performed by: PHYSICIAN ASSISTANT

## 2022-05-25 PROCEDURE — 99285 EMERGENCY DEPT VISIT HI MDM: CPT | Mod: 25

## 2022-05-25 PROCEDURE — 86140 C-REACTIVE PROTEIN: CPT | Performed by: PHYSICIAN ASSISTANT

## 2022-05-25 PROCEDURE — 85004 AUTOMATED DIFF WBC COUNT: CPT | Performed by: PHYSICIAN ASSISTANT

## 2022-05-25 RX ORDER — IOPAMIDOL 755 MG/ML
79 INJECTION, SOLUTION INTRAVASCULAR ONCE
Status: COMPLETED | OUTPATIENT
Start: 2022-05-25 | End: 2022-05-25

## 2022-05-25 RX ADMIN — AMOXICILLIN AND CLAVULANATE POTASSIUM 1 TABLET: 875; 125 TABLET, FILM COATED ORAL at 19:14

## 2022-05-25 RX ADMIN — IOPAMIDOL 79 ML: 755 INJECTION, SOLUTION INTRAVENOUS at 18:06

## 2022-05-25 ASSESSMENT — ENCOUNTER SYMPTOMS
HEMATOLOGIC/LYMPHATIC NEGATIVE: 1
RESPIRATORY NEGATIVE: 1
EYES NEGATIVE: 1
CONSTITUTIONAL NEGATIVE: 1
ENDOCRINE NEGATIVE: 1
CARDIOVASCULAR NEGATIVE: 1
PSYCHIATRIC NEGATIVE: 1
NEUROLOGICAL NEGATIVE: 1
MUSCULOSKELETAL NEGATIVE: 1
ALLERGIC/IMMUNOLOGIC NEGATIVE: 1
DIARRHEA: 1
ABDOMINAL PAIN: 1

## 2022-05-25 NOTE — DISCHARGE INSTRUCTIONS
What to expect when you have contrast    During your exam, we will inject  contrast  into your vein or artery. (Contrast is a clear liquid with iodine in it. It shows up on X-rays.)    You may feel warm or hot. You may have a metal taste in your mouth and a slight upset stomach. You may also feel pressure near the kidneys and bladder. These effects will last about 1 to 3 minutes.    Please tell us if you have:   Sneezing    Itching   Hives    Swelling in the face   A hoarse voice   Breathing problems   Other new symptoms    Serious problems are rare.  They may include:   Irregular heartbeat    Seizures   Kidney failure             Tissue damage   Shock     Death    If you have any problems during the exam, we  will treat them right away.    When you get home    Call your hospital if you have any new symptoms in the next 2 days, like hives or swelling. (Phone numbers are at the bottom of this page.) Or call your family doctor.     If you have wheezing or trouble breathing, call 911.    Self-care  -Drink at least 4 extra glasses of water today.   This reduces the stress on your kidneys.  -Keep taking your regular medicines.    The contrast will pass out of your body in your  Urine(pee). This will happen in the next 24 hours. You  will not feel this. Your urine will not  change color.    If you have kidney problems or take metformin    Drink 4 to 8 large glasses of water for the next  2 days, if you are not on a fluid restriction.    ?If you take metformin (Glucophage or Glucovance) for diabetes, keep taking it.      ?Your kidney function tests are abnormal.  If you take Metformin, do not take it for 48 hours. Please go to your clinic for a blood test within 3 days after your exam before the restarting this medicine.     (Note to provider:please give patient prescription for lab tests.)    ?Special instructions: -    I have read and understand the above information.    Patient Sign  Here:______________________________________Date:________Time:______    Staff Sign Here:________________________________________Date:_______Time:______      Radiology Departments:     ?[facility]: [phone] ?[place]: [phone]     ?[place]: [phone] ?[facility]:[phone]      ?[place]: [phone]  ?[place]: [phone]  ?[place]:[phone]    ?[facility]:[phone]  ?[facility]:[phone]    Augmentin 875 mg twice a day for 10 days  Tylenol for pain  Information given on diverticulitis  If symptoms worsen or change increased abdominal pain fever nausea vomiting return back to the ER for further evaluation and treatment  Discharged in stable condition

## 2022-05-25 NOTE — ED TRIAGE NOTES
Pt presents with c/o abd pain for the past week. Pt states every time he eats he has a loose BM.

## 2022-05-26 NOTE — ED PROVIDER NOTES
History     Chief Complaint   Patient presents with     Abdominal Pain     HPI  Tomasz Newman is a 56 year old male who diarrhea, generalized abdominal pain after eating over the past week.  Patient states started very abruptly.  He has had no fever.  No nausea vomiting.  He feels very bloated and gassy.  Patient denies any hematochezia or bright red blood.  Patient's has no history of any gastroenteritis in the past.  He denies any change in medication.  He has had reflux in the past and takes omeprazole.  She did have COVID-19 back in November 2021 hypoxia.  He is not vaccinated.  He states that the stools are mostly just loose but no mucus.  Has been overall eating and drinking well sleeping fine.  He is not take anything for his symptoms.  Patient has not had a colonoscopy in the past.    Allergies:  Allergies   Allergen Reactions     Percocet [Oxycodone-Acetaminophen]      Facial Swelling       Problem List:    Patient Active Problem List    Diagnosis Date Noted     Acute respiratory failure with hypoxia (H) 12/15/2021     Priority: Medium     Pneumonia due to 2019 novel coronavirus 12/14/2021     Priority: Medium     Benign essential HTN 10/09/2020     Priority: Medium        Past Medical History:    History reviewed. No pertinent past medical history.    Past Surgical History:    Past Surgical History:   Procedure Laterality Date     HERNIA REPAIR  2002       Family History:    Family History   Problem Relation Age of Onset     Cancer - colorectal Father      Diabetes Brother      Heart Disease Mother         heart disease       Social History:  Marital Status:  Single [1]  Social History     Tobacco Use     Smoking status: Never Smoker     Smokeless tobacco: Never Used     Tobacco comment: no passive exposure   Substance Use Topics     Alcohol use: Yes     Comment: wine 3 glasses occasionally        Medications:    amoxicillin-clavulanate (AUGMENTIN) 875-125 MG tablet  aspirin 81 MG EC  tablet  cyclobenzaprine (FLEXERIL) 10 MG tablet  naproxen sodium (ANAPROX) 220 MG tablet  omeprazole (PRILOSEC) 20 MG DR capsule          Review of Systems   Constitutional: Negative.    HENT: Negative.    Eyes: Negative.    Respiratory: Negative.    Cardiovascular: Negative.    Gastrointestinal: Positive for abdominal pain and diarrhea.   Endocrine: Negative.    Genitourinary: Negative.    Musculoskeletal: Negative.    Allergic/Immunologic: Negative.    Neurological: Negative.    Hematological: Negative.    Psychiatric/Behavioral: Negative.        Physical Exam   BP: 150/89  Pulse: 87  Temp: 97.7  F (36.5  C)  Resp: 16  SpO2: 96 %      Physical Exam  Vitals and nursing note reviewed.   Constitutional:       Appearance: He is well-developed.   Cardiovascular:      Rate and Rhythm: Normal rate and regular rhythm.      Heart sounds: Normal heart sounds.   Pulmonary:      Effort: Pulmonary effort is normal.      Breath sounds: Normal breath sounds.   Abdominal:      General: Abdomen is flat. Bowel sounds are normal.      Palpations: Abdomen is soft.      Tenderness: There is abdominal tenderness in the left lower quadrant. There is no guarding or rebound. Negative signs include Sinclair's sign, Rovsing's sign, McBurney's sign, psoas sign and obturator sign.   Skin:     General: Skin is warm.      Capillary Refill: Capillary refill takes less than 2 seconds.   Neurological:      General: No focal deficit present.      Mental Status: He is alert and oriented to person, place, and time.   Psychiatric:         Mood and Affect: Mood normal.         ED Course      56-year-old male presents to the ER no apparent distress his vitals are stable he looks nontoxic his physical exam shows some left lower quadrant pain with palpation but no guarding or rebound.  IV was established patient was given 1 L of IV fluids and labs were drawn which were unremarkable along with urinalysis.  CT scan of the abdomen pelvis shows a mild colitis  versus a early diverticulitis.  Patient really did not require anything for pain nausea or vomiting.  Or any other interventions at this time.  Patient did have COVID-19 back in 2021.  Patient restarted on Augmentin 875 mg twice a day for 10 days for possible diverticulitis discussed home treatment.  Gave information about diverticulitis type diet.  Patient should return back to the ER if increased abdominal pain nausea vomiting or fever over 101.  Would recommend patient have a follow-up colonoscopy he can make an appointment with his primary care.  Patient understood the above assessment treatment and plan he was discharged in stable condition.                    Results for orders placed or performed during the hospital encounter of 05/25/22 (from the past 24 hour(s))   CBC with platelets differential    Narrative    The following orders were created for panel order CBC with platelets differential.  Procedure                               Abnormality         Status                     ---------                               -----------         ------                     CBC with platelets and d...[[id number]]                      Final result                 Please view results for these tests on the individual orders.   Comprehensive metabolic panel   Result Value Ref [place]    Sodium 138 133 - 144 mmol/L    Potassium 3.7 3.4 - 5.3 mmol/L    Chloride 108 94 - 109 mmol/L    Carbon Dioxide (CO2) 27 20 - 32 mmol/L    Anion Gap 3 3 - 14 mmol/L    Urea Nitrogen 14 7 - 30 mg/dL    Creatinine 0.90 0.66 - 1.25 mg/dL    Calcium 9.0 8.5 - 10.1 mg/dL    Glucose 104 (H) 70 - 99 mg/dL    Alkaline Phosphatase 77 40 - 150 U/L    AST 29 0 - 45 U/L    ALT 39 0 - 70 U/L    Protein Total 7.3 6.8 - 8.8 g/dL    Albumin 3.9 3.4 - 5.0 g/dL    Bilirubin Total 0.6 0.2 - 1.3 mg/dL    GFR Estimate >90 >60 mL/min/1.73m2   CRP inflammation   Result Value Ref [place]    CRP Inflammation 7.4 0.0 - 8.0 mg/L   Lipase   Result Value Ref [place]     Lipase 81 73 - 393 U/L   Lactic acid whole blood   Result Value Ref [place]    Lactic Acid 0.9 0.7 - 2.0 mmol/L   INR   Result Value Ref [place]    INR 1.07 0.85 - 1.15   CBC with platelets and differential   Result Value Ref [place]    WBC Count 5.8 4.0 - 11.0 10e3/uL    RBC Count 5.01 4.40 - 5.90 10e6/uL    Hemoglobin 15.2 13.3 - 17.7 g/dL    Hematocrit 44.4 40.0 - 53.0 %    MCV 89 78 - 100 fL    MCH 30.3 26.5 - 33.0 pg    MCHC 34.2 31.5 - 36.5 g/dL    RDW 13.3 10.0 - 15.0 %    Platelet Count 224 150 - 450 10e3/uL    % Neutrophils 62 %    % Lymphocytes 22 %    % Monocytes 14 %    % Eosinophils 1 %    % Basophils 1 %    % Immature Granulocytes 0 %    NRBCs per 100 WBC 0 <1 /100    Absolute Neutrophils 3.6 1.6 - 8.3 10e3/uL    Absolute Lymphocytes 1.2 0.8 - 5.3 10e3/uL    Absolute Monocytes 0.8 0.0 - 1.3 10e3/uL    Absolute Eosinophils 0.1 0.0 - 0.7 10e3/uL    Absolute Basophils 0.0 0.0 - 0.2 10e3/uL    Absolute Immature Granulocytes 0.0 <=0.4 10e3/uL    Absolute NRBCs 0.0 10e3/uL   Extra Tube    Narrative    The following orders were created for panel order Extra Tube.  Procedure                               Abnormality         Status                     ---------                               -----------         ------                     Extra Red Top Tube[[id number]]                               Final result                 Please view results for these tests on the individual orders.   Extra Red Top Tube   Result Value Ref [place]    Hold Specimen     CT Abdomen Pelvis w Contrast    Narrative    Exam:CT ABDOMEN PELVIS W CONTRAST    History: 56 years Male with abdominal pain of one-week duration.     Comparisons:    Technique: Axial CT imaging of the abdomen and pelvis was performed  with intravenous contrast. Coronal and sagittal reconstructions were  obtained      FINDINGS:  Lung bases: There is mild atelectasis.    Abdomen:  Liver:Unremarkable  Gallbladder and biliary tree:No calcified gallstones are present.  There  is no evidence of biliary dilatation.  Pancreas:Unremarkable  Spleen:Unremarkable  Adrenals:Normal    Kidneys and ureters:Unremarkable    Lymph nodes:There is no significant lymphadenopathy    Bowel: 6 diverticulosis of sigmoid colon. There is mild wall  thickening and pericolonic edema the sigmoid. Otherwise unremarkable.    Appendix: No inflammatory changes are seen in the right lower  quadrant.    Vessels:Unremarkable    Osseous structures:Unremarkable    Pelvis:There is no evidence of mass or lymphadenopathy. No abnormal  fluid collections are present.            Impression    IMPRESSION: Diverticulosis of sigmoid colon with mild wall thickening  and pericolonic edema. Question mild early diverticulitis versus  colitis. No acute intra-abdominal findings otherwise.    ALFRED TORRES MD         SYSTEM ID:  [id number]       Medications   iopamidol (ISOVUE-370) solution 79 mL (79 mLs Intravenous Given 5/25/22 1806)   sodium chloride (PF) 0.9% PF flush 60 mL (50 mLs Intravenous Given 5/25/22 1806)   amoxicillin-clavulanate (AUGMENTIN) 875-125 MG per tablet 1 tablet (1 tablet Oral Given 5/25/22 1914)       Assessments & Plan (with Medical Decision Making)     I have reviewed the nursing notes.    I have reviewed the findings, diagnosis, plan and need for follow up with the patient.      Discharge Medication List as of 5/25/2022  7:21 PM      START taking these medications    Details   amoxicillin-clavulanate (AUGMENTIN) 875-125 MG tablet Take 1 tablet by mouth 2 times daily for 10 days, Disp-20 tablet, R-0, E-Prescribe             Final diagnoses:   Diverticulitis of colon       5/25/2022   [facility]     Alissa Martinez PA-C  05/25/22 2028

## 2022-09-04 ENCOUNTER — HEALTH MAINTENANCE LETTER (OUTPATIENT)
Age: 57
End: 2022-09-04

## 2023-04-29 ENCOUNTER — HEALTH MAINTENANCE LETTER (OUTPATIENT)
Age: 58
End: 2023-04-29

## 2024-01-09 ENCOUNTER — HOSPITAL ENCOUNTER (EMERGENCY)
Facility: HOSPITAL | Age: 59
Discharge: HOME | End: 2024-01-09
Payer: COMMERCIAL

## 2024-01-09 VITALS
RESPIRATION RATE: 18 BRPM | OXYGEN SATURATION: 96 % | HEART RATE: 91 BPM | DIASTOLIC BLOOD PRESSURE: 85 MMHG | SYSTOLIC BLOOD PRESSURE: 131 MMHG | TEMPERATURE: 97.6 F

## 2024-01-09 DIAGNOSIS — J01.90 ACUTE SINUSITIS WITH SYMPTOMS > 10 DAYS: ICD-10-CM

## 2024-01-09 PROCEDURE — G0463 HOSPITAL OUTPT CLINIC VISIT: HCPCS

## 2024-01-09 PROCEDURE — 99213 OFFICE O/P EST LOW 20 MIN: CPT

## 2024-01-09 ASSESSMENT — ENCOUNTER SYMPTOMS
FEVER: 0
COUGH: 1
NAUSEA: 0
VOMITING: 0
DIARRHEA: 0
SINUS PRESSURE: 1
SINUS PAIN: 1
ACTIVITY CHANGE: 0
SHORTNESS OF BREATH: 0
FATIGUE: 0
CHILLS: 0
ABDOMINAL PAIN: 0
SORE THROAT: 1
APPETITE CHANGE: 0

## 2024-01-09 NOTE — ED TRIAGE NOTES
Pt presents with c/o cough     On going or 3 weeks     Cough, congestion/drainage,sinus pressure, ear pressure.    Dayquil, matthew seltzer

## 2024-01-09 NOTE — DISCHARGE INSTRUCTIONS
Antibiotics 2 times daily for 7 days. Yogurt or probiotic while taking.   Push fluids. Nasal saline or flonase nasal spray to help with congestion.   Return with any chest pain, shortness of breath, or other concerns. Follow up in the clinic as needed.

## 2024-01-09 NOTE — ED PROVIDER NOTES
History     Chief Complaint   Patient presents with    Cough     HPI  Tomasz Newman is a 58 year old male who presents to the urgent care with a 3 week history of cough, congestion, sinus pressure, and ear pressure. He denies chest pain, shortness of breath, and n/v/d. Non smoker. No hx of asthma or COPD. No recent abx. Has been taking dayquil and Wen [place] with minimal change in symptoms.     Allergies:  Allergies   Allergen Reactions    Percocet [Oxycodone-Acetaminophen]      Facial Swelling       Problem List:    Patient Active Problem List    Diagnosis Date Noted    Acute respiratory failure with hypoxia (H) 12/15/2021     Priority: Medium    Pneumonia due to 2019 novel coronavirus 12/14/2021     Priority: Medium    Benign essential HTN 10/09/2020     Priority: Medium        Past Medical History:    No past medical history on file.    Past Surgical History:    Past Surgical History:   Procedure Laterality Date    HERNIA REPAIR  2002       Family History:    Family History   Problem Relation Age of Onset    Cancer - colorectal Father     Diabetes Brother     Heart Disease Mother         heart disease       Social History:  Marital Status:  Single [1]  Social History     Tobacco Use    Smoking status: Never    Smokeless tobacco: Never    Tobacco comments:     no passive exposure   Substance Use Topics    Alcohol use: Yes     Comment: wine 3 glasses occasionally        Medications:    amoxicillin-clavulanate (AUGMENTIN) 875-125 MG tablet  aspirin 81 MG EC tablet  cyclobenzaprine (FLEXERIL) 10 MG tablet  naproxen sodium (ANAPROX) 220 MG tablet  omeprazole (PRILOSEC) 20 MG DR capsule          Review of Systems   Constitutional:  Negative for activity change, appetite change, chills, fatigue and fever.   HENT:  Positive for congestion, ear pain, sinus pressure, sinus pain and sore throat.    Respiratory:  Positive for cough. Negative for shortness of breath.    Cardiovascular:  Negative for chest pain.    Gastrointestinal:  Negative for abdominal pain, diarrhea, nausea and vomiting.   All other systems reviewed and are negative.      Physical Exam   BP: 131/85  Pulse: 91  Temp: 97.6  F (36.4  C)  Resp: 18  SpO2: 96 %      Physical Exam  Vitals and nursing note reviewed.   Constitutional:       General: He is not in acute distress.     Appearance: Normal appearance. He is not ill-appearing, toxic-appearing or diaphoretic.   HENT:      Right Ear: Tympanic membrane, ear canal and external ear normal. There is no impacted cerumen.      Left Ear: Tympanic membrane, ear canal and external ear normal. There is no impacted cerumen.      Nose: Congestion present. No nasal tenderness.      Right Sinus: Maxillary sinus tenderness present. No frontal sinus tenderness.      Left Sinus: Maxillary sinus tenderness present. No frontal sinus tenderness.      Mouth/Throat:      Mouth: Mucous membranes are moist.      Pharynx: Oropharynx is clear. No oropharyngeal exudate or posterior oropharyngeal erythema.   Cardiovascular:      Rate and Rhythm: Normal rate and regular rhythm.      Pulses: Normal pulses.      Heart sounds: Normal heart sounds.   Pulmonary:      Effort: Pulmonary effort is normal. No respiratory distress.      Breath sounds: Normal breath sounds. No stridor. No wheezing, rhonchi or rales.   Lymphadenopathy:      Cervical: Cervical adenopathy present.   Neurological:      Mental Status: He is alert.         ED Course                 Procedures                No results found for this or any previous visit (from the past 24 hour(s)).    Medications - No data to display    Assessments & Plan (with Medical Decision Making)     I have reviewed the nursing notes.    I have reviewed the findings, diagnosis, plan and need for follow up with the patient.  Tomasz Newman is a 58 year old male who presents to the urgent care with a 3 week history of cough, congestion, sinus pressure, and ear pressure. He denies chest pain,  shortness of breath, and n/v/d. Non smoker. No hx of asthma or COPD. No recent abx. Has been taking dayquil and [place] with minimal change in symptoms.     MDM: vital signs normal, afebrile. Lungs clear, heart tones regular. Non toxic in appearance with no noted distress. Given his symptoms and length of sinus pressure and cough, will treat with abx. Supportive measures and return precautions discussed. He is in agreement with plan.     (J01.90) Acute sinusitis with symptoms > 10 days  Plan: Antibiotics 2 times daily for 7 days. Yogurt or probiotic while taking.   Push fluids. Nasal saline or flonase nasal spray to help with congestion.   Return with any chest pain, shortness of breath, or other concerns. Follow up in the clinic as needed. Understanding verbalized.        New Prescriptions    AMOXICILLIN-CLAVULANATE (AUGMENTIN) 875-125 MG TABLET    Take 1 tablet by mouth 2 times daily for 7 days       Final diagnoses:   Acute sinusitis with symptoms > 10 days       1/9/2024   [facility]       Johana Rodriguez NP  01/09/24 [id number]

## 2024-07-06 ENCOUNTER — HEALTH MAINTENANCE LETTER (OUTPATIENT)
Age: 59
End: 2024-07-06

## 2025-05-30 ENCOUNTER — HOSPITAL ENCOUNTER (EMERGENCY)
Facility: HOSPITAL | Age: 60
Discharge: HOME | End: 2025-05-30
Attending: PHYSICIAN ASSISTANT | Admitting: PHYSICIAN ASSISTANT
Payer: COMMERCIAL

## 2025-05-30 VITALS
HEART RATE: 98 BPM | SYSTOLIC BLOOD PRESSURE: 167 MMHG | WEIGHT: 185 LBS | RESPIRATION RATE: 16 BRPM | TEMPERATURE: 97.9 F | DIASTOLIC BLOOD PRESSURE: 90 MMHG | OXYGEN SATURATION: 97 % | HEIGHT: 70 IN | BODY MASS INDEX: 26.48 KG/M2

## 2025-05-30 DIAGNOSIS — M54.50 LUMBAR BACK PAIN: ICD-10-CM

## 2025-05-30 PROCEDURE — 99213 OFFICE O/P EST LOW 20 MIN: CPT | Performed by: PHYSICIAN ASSISTANT

## 2025-05-30 PROCEDURE — G0463 HOSPITAL OUTPT CLINIC VISIT: HCPCS

## 2025-05-30 RX ORDER — PREDNISONE 20 MG/1
TABLET ORAL
Qty: 10 TABLET | Refills: 0 | Status: SHIPPED | OUTPATIENT
Start: 2025-05-30

## 2025-05-30 RX ORDER — CYCLOBENZAPRINE HCL 10 MG
10 TABLET ORAL 3 TIMES DAILY PRN
Qty: 20 TABLET | Refills: 0 | Status: SHIPPED | OUTPATIENT
Start: 2025-05-30

## 2025-05-30 NOTE — ED TRIAGE NOTES
Pt presents with lower right back pain. PT stated no injury but overuse mowing and gardening. PT has been taking tylenol.

## 2025-05-30 NOTE — DISCHARGE INSTRUCTIONS
Take the Flexeril and Prednisone as prescribed for your back pain. No driving after taking the flexeril.   Consider PT as discussed.   Return here with any new or worsening symptoms.

## 2025-05-30 NOTE — ED PROVIDER NOTES
"  History     Chief Complaint   Patient presents with    Back Pain     HPI  Tomasz Newman is a 59 year old male who presents with right sided low back pain that began after gardening this week. He has a h/o musculoskeletal low back pain and this feels similar. He has had flexeril in the past which has given some relief. Denies saddle paresthesias or loss or bowel/bladder control. No trauma. Denies urinary symptoms. No fevers/chills or abdominal pain.     Allergies:  Allergies   Allergen Reactions    Percocet [Oxycodone-Acetaminophen]      Facial Swelling       Problem List:    Patient Active Problem List    Diagnosis Date Noted    Acute respiratory failure with hypoxia (H) 12/15/2021     Priority: Medium    Pneumonia due to 2019 novel coronavirus 12/14/2021     Priority: Medium    Benign essential HTN 10/09/2020     Priority: Medium        Past Medical History:    No past medical history on file.    Past Surgical History:    Past Surgical History:   Procedure Laterality Date    HERNIA REPAIR  2002       Family History:    Family History   Problem Relation Age of Onset    Cancer - colorectal Father     Diabetes Brother     Heart Disease Mother         heart disease       Social History:  Marital Status:  Single [1]  Social History     Tobacco Use    Smoking status: Never    Smokeless tobacco: Never    Tobacco comments:     no passive exposure   Substance Use Topics    Alcohol use: Yes     Comment: wine 3 glasses occasionally        Medications:    aspirin 81 MG EC tablet  cyclobenzaprine (FLEXERIL) 10 MG tablet  naproxen sodium (ANAPROX) 220 MG tablet  omeprazole (PRILOSEC) 20 MG DR capsule  predniSONE (DELTASONE) 20 MG tablet          Review of Systems   All other systems reviewed and are negative.      Physical Exam   BP: (!) 167/90  Pulse: 98  Temp: 97.9  F (36.6  C)  Resp: 16  Height: 177.8 cm (5' 10\")  Weight: 83.9 kg (185 lb)  SpO2: 97 %      Physical Exam  Vitals and nursing note reviewed.   HENT:      " Head: Normocephalic and atraumatic.   Cardiovascular:      Rate and Rhythm: Normal rate.      Pulses: Normal pulses.   Pulmonary:      Effort: Pulmonary effort is normal.   Musculoskeletal:      Cervical back: Normal range of motion.      Lumbar back: Spasms present. No bony tenderness. Decreased range of motion. Negative right straight leg raise test and negative left straight leg raise test.        Back:          ED Course        Procedures            No results found for this or any previous visit (from the past 24 hours).    Medications - No data to display    Assessments & Plan (with Medical Decision Making)   Exam consistent with musculoskeletal lumbar back pain with spasm. RX for Flexeril and Prednisone was provided. He was discharged home following in stable condition.     Plan:  Take the Flexeril and Prednisone as prescribed for your back pain. No driving after taking the flexeril.   Consider PT as discussed.   Return here with any new or worsening symptoms.       I have reviewed the nursing notes.    I have reviewed the findings, diagnosis, plan and need for follow up with the patient.  New Prescriptions    CYCLOBENZAPRINE (FLEXERIL) 10 MG TABLET    Take 1 tablet (10 mg) by mouth 3 times daily as needed.    PREDNISONE (DELTASONE) 20 MG TABLET    Take two tablets (= 40mg) each day for 5 (five) days       Final diagnoses:   Lumbar back pain       5/30/2025   [facility]

## 2025-07-13 ENCOUNTER — HEALTH MAINTENANCE LETTER (OUTPATIENT)
Age: 60
End: 2025-07-13